# Patient Record
Sex: MALE | Race: WHITE | Employment: FULL TIME | URBAN - METROPOLITAN AREA
[De-identification: names, ages, dates, MRNs, and addresses within clinical notes are randomized per-mention and may not be internally consistent; named-entity substitution may affect disease eponyms.]

---

## 2017-06-09 ENCOUNTER — OFFICE VISIT (OUTPATIENT)
Dept: FAMILY MEDICINE CLINIC | Age: 42
End: 2017-06-09

## 2017-06-09 VITALS
DIASTOLIC BLOOD PRESSURE: 86 MMHG | HEART RATE: 64 BPM | BODY MASS INDEX: 41.31 KG/M2 | HEIGHT: 72 IN | SYSTOLIC BLOOD PRESSURE: 126 MMHG | WEIGHT: 305 LBS

## 2017-06-09 DIAGNOSIS — I10 ESSENTIAL HYPERTENSION: ICD-10-CM

## 2017-06-09 DIAGNOSIS — Z72.0 TOBACCO ABUSE: ICD-10-CM

## 2017-06-09 DIAGNOSIS — Q61.3 POLYCYSTIC KIDNEY DISEASE: Primary | ICD-10-CM

## 2017-06-09 PROCEDURE — 99214 OFFICE O/P EST MOD 30 MIN: CPT | Performed by: FAMILY MEDICINE

## 2017-06-09 RX ORDER — LISINOPRIL 10 MG/1
10 TABLET ORAL DAILY
Qty: 30 TABLET | Refills: 11 | Status: ON HOLD | OUTPATIENT
Start: 2017-06-09 | End: 2017-10-19

## 2017-08-10 ENCOUNTER — OFFICE VISIT (OUTPATIENT)
Dept: CARDIOLOGY CLINIC | Age: 42
End: 2017-08-10

## 2017-08-10 VITALS
WEIGHT: 305 LBS | SYSTOLIC BLOOD PRESSURE: 132 MMHG | HEIGHT: 72 IN | HEART RATE: 98 BPM | BODY MASS INDEX: 41.31 KG/M2 | DIASTOLIC BLOOD PRESSURE: 84 MMHG

## 2017-08-10 DIAGNOSIS — I10 ESSENTIAL HYPERTENSION: ICD-10-CM

## 2017-08-10 DIAGNOSIS — I47.1 SVT (SUPRAVENTRICULAR TACHYCARDIA) (HCC): Primary | ICD-10-CM

## 2017-08-10 DIAGNOSIS — Z72.0 TOBACCO ABUSE: ICD-10-CM

## 2017-08-10 PROCEDURE — 93000 ELECTROCARDIOGRAM COMPLETE: CPT | Performed by: NURSE PRACTITIONER

## 2017-08-10 PROCEDURE — 99214 OFFICE O/P EST MOD 30 MIN: CPT | Performed by: NURSE PRACTITIONER

## 2017-08-10 RX ORDER — METOPROLOL TARTRATE 50 MG/1
50 TABLET, FILM COATED ORAL 2 TIMES DAILY
Qty: 60 TABLET | Refills: 5 | Status: SHIPPED | OUTPATIENT
Start: 2017-08-10 | End: 2017-12-08 | Stop reason: SDUPTHER

## 2017-08-12 PROBLEM — N18.2 STAGE 2 CHRONIC KIDNEY DISEASE: Status: ACTIVE | Noted: 2017-08-12

## 2017-08-15 ENCOUNTER — TELEPHONE (OUTPATIENT)
Dept: CARDIOLOGY CLINIC | Age: 42
End: 2017-08-15

## 2017-08-22 ENCOUNTER — TELEPHONE (OUTPATIENT)
Dept: CARDIOLOGY | Age: 42
End: 2017-08-22

## 2017-08-29 ENCOUNTER — HOSPITAL ENCOUNTER (OUTPATIENT)
Dept: MRI IMAGING | Age: 42
Discharge: HOME | End: 2017-08-29
Admitting: INTERNAL MEDICINE

## 2017-08-29 DIAGNOSIS — Q61.2 ADULT TYPE POLYCYSTIC KIDNEY: ICD-10-CM

## 2017-08-29 DIAGNOSIS — Q61.2 POLYCYSTIC KIDNEY, AUTOSOMAL DOMINANT: ICD-10-CM

## 2017-10-19 DIAGNOSIS — G47.33 OSA (OBSTRUCTIVE SLEEP APNEA): Primary | ICD-10-CM

## 2017-10-19 PROBLEM — I48.92 ATRIAL FLUTTER (HCC): Status: ACTIVE | Noted: 2017-10-19

## 2017-11-10 ENCOUNTER — TELEPHONE (OUTPATIENT)
Dept: CARDIOLOGY CLINIC | Age: 42
End: 2017-11-10

## 2017-11-10 DIAGNOSIS — I47.1 SVT (SUPRAVENTRICULAR TACHYCARDIA) (HCC): Primary | ICD-10-CM

## 2017-11-10 DIAGNOSIS — I48.92 ATRIAL FLUTTER, UNSPECIFIED TYPE (HCC): ICD-10-CM

## 2017-11-10 NOTE — TELEPHONE ENCOUNTER
Pt's wife called stating patient was to receive a heart monitor in the mail but has not yet received it.

## 2017-11-29 ENCOUNTER — TELEPHONE (OUTPATIENT)
Dept: CARDIOLOGY CLINIC | Age: 42
End: 2017-11-29

## 2017-11-29 ENCOUNTER — TELEPHONE (OUTPATIENT)
Dept: FAMILY MEDICINE CLINIC | Age: 42
End: 2017-11-29

## 2017-11-29 DIAGNOSIS — R00.1 BRADYCARDIA: Primary | ICD-10-CM

## 2017-11-29 RX ORDER — LOSARTAN POTASSIUM 100 MG/1
100 TABLET ORAL DAILY
Qty: 30 TABLET | Refills: 3 | Status: SHIPPED | OUTPATIENT
Start: 2017-11-29 | End: 2018-06-10 | Stop reason: SDUPTHER

## 2017-11-29 NOTE — TELEPHONE ENCOUNTER
Spoke with pt. He does not have sleep apnea. Pt states he could have trouble with employer if diagnosed with sleep apnea. I explained that he needed to have sleep apnea evaluated. Will placed referral to Dr. Kaity Centeno.

## 2017-12-08 ENCOUNTER — OFFICE VISIT (OUTPATIENT)
Dept: CARDIOLOGY CLINIC | Age: 42
End: 2017-12-08

## 2017-12-08 VITALS
OXYGEN SATURATION: 93 % | HEART RATE: 70 BPM | SYSTOLIC BLOOD PRESSURE: 110 MMHG | HEIGHT: 72 IN | DIASTOLIC BLOOD PRESSURE: 80 MMHG | WEIGHT: 297 LBS | BODY MASS INDEX: 40.23 KG/M2

## 2017-12-08 DIAGNOSIS — I48.92 ATRIAL FLUTTER, UNSPECIFIED TYPE (HCC): Primary | ICD-10-CM

## 2017-12-08 PROCEDURE — G8427 DOCREV CUR MEDS BY ELIG CLIN: HCPCS | Performed by: INTERNAL MEDICINE

## 2017-12-08 PROCEDURE — G8484 FLU IMMUNIZE NO ADMIN: HCPCS | Performed by: INTERNAL MEDICINE

## 2017-12-08 PROCEDURE — 93000 ELECTROCARDIOGRAM COMPLETE: CPT | Performed by: INTERNAL MEDICINE

## 2017-12-08 PROCEDURE — G8417 CALC BMI ABV UP PARAM F/U: HCPCS | Performed by: INTERNAL MEDICINE

## 2017-12-08 PROCEDURE — 4004F PT TOBACCO SCREEN RCVD TLK: CPT | Performed by: INTERNAL MEDICINE

## 2017-12-08 PROCEDURE — 99214 OFFICE O/P EST MOD 30 MIN: CPT | Performed by: INTERNAL MEDICINE

## 2017-12-08 NOTE — PROGRESS NOTES
[address]   Electrophysiology Follow up            Date:  December 8, 2017  Patient name: Tori Schilder  YOB: 1975    Reason for visit: atrial arrhythmias     Primary Care physician: Nancy Severino MD     HISTORY OF PRESENT ILLNESS: Tori Schilder is a 36 y.o. male who presents for follow up for SVT. He reports while at work during lunch, his heart started racing. He went to [facility] ER. He reports he had an EKG in ER and in the ambulance. He was started on metoprolol at the ER. He denies previous episodes of palpitations. He has HTN and smokes 1 ppd.   10/19/2017 he underwent RFCA/AVN re-entry tachy. Interval history since last office visit:    His EKG from today shows sinus rhythm rate 61. His FREDY from 12/2017 showed nocturnal episodes of CHB. He has not yet been evaluated for sleep apnea. He has been taking his medications as prescribed. He tolerates daily activity well. He continues to smoke daily. Patient currently denies any weight gain, edema, palpitations, chest pain, shortness of breath, dizziness, and syncope. Past Medical History:   has a past medical history of CKD (chronic kidney disease); HTN (hypertension); PSVT (paroxysmal supraventricular tachycardia) ([address] and Stage 2 chronic kidney disease. Past Surgical History:   has a past surgical history that includes Cardiac catheterization (2008) and other surgical history (2001). Home Medications:    Prior to Admission medications    Medication Sig Start Date End Date Taking? Authorizing Provider   losartan (COZAAR) 100 MG tablet Take 1 tablet by mouth daily 11/29/17  Yes Jonnie Herr MD   metoprolol tartrate (LOPRESSOR) 50 MG tablet Take 1 tablet by mouth 2 times daily 8/10/17  Yes Micheal Cummings CNP       Allergies:  Review of patient's allergies indicates no known allergies. Social History:   reports that he has been smoking Cigarettes.   He has been smoking about 1.00 pack per day. He has never used smokeless tobacco. He reports that he does not drink alcohol or use drugs. Family History: family history includes Diabetes in his son; Other in his father and mother. · REVIEW OF SYSTEMS:  Unchanged since last visit  · Constitutional: there has been no unanticipated weight loss. There's been no change in energy level, sleep pattern, or activity level. · Eyes: No visual changes or diplopia. No scleral icterus. · ENT: No Headaches, hearing loss or vertigo. No mouth sores or sore throat. · Cardiovascular: No for chest pain, No for dyspnea  on exertion, No for palpitations or No for loss of consciousness. No cough, hemoptysis, No for pleuritic pain, or phlebitis. · Respiratory: No for cough or wheezing, no sputum production. No hematemesis. · Gastrointestinal: No abdominal pain, appetite loss, blood in stools. No change in  bowel or bladder habits. · Genitourinary: No dysuria, trouble voiding, or hematuria. · Musculoskeletal:  No gait disturbance, No for weakness or joint complaints. · Integumentary: No rash or pruritis. · Neurological: No headache, diplopia, change in muscle strength, numbness or tingling. No change in gait, balance, coordination, mood, affect, memory,  mentation, behavior. · Psychiatric: No anxiety, or depression. · Endocrine: No temperature intolerance. No excessive thirst, fluid intake, or urination. No tremor. · Hematologic/Lymphatic: No abnormal bruising or bleeding, blood clots or  swollen lymph nodes. · Allergic/Immunologic: No nasal congestion or hives. Physical Examination:  Unchanged since last visit   /80   Pulse 70   Ht 6' (1.829 m)   Wt 297 lb (134.7 kg)   SpO2 93%   BMI 40.28 kg/m²      Constitutional and General Appearance: alert, cooperative, no distress and appears stated age  [de-identified]: PERRL, no cervical lymphadenopathy. No masses palpable.  Normal oral mucosa  Respiratory:  · Normal excursion and expansion without use of accessory muscles  · Resp Auscultation: Normal breath sounds without dullness or wheezing  Cardiovascular:  · The apical impulse is not displaced  · Heart tones are crisp and normal. regular S1 and S2.  · Jugular venous pulsation Normal  · The carotid upstroke is normal in amplitude and contour without delay or bruit  · Peripheral pulses are symmetrical and full  Abdomen:  · No masses or tenderness  · Bowel sounds present  Extremities:  ·  No Cyanosis or Clubbing  ·  Lower extremity edema: No  · Skin: Warm and dry  Neurological:  · Alert and oriented. · Moves all extremities well  · No abnormalities of mood, affect, memory, mentation, or behavior are noted      DATA:    ECG:  SR HR 59  ECHO:       IMPRESSION:    1. Atrial flutter typical post ablation   2. S/p ablation for AFL and direct current cardioversion during EP study for Paroxysmal atrial fibrillation       RECOMMENDATIONS:  1. Smoking cessation discussed  2. Recommend sleep apnea testing. 3. Decrease Metoprolol to 25 mg twice daily due to episodes of bradycardia at night. 4. The patient is asked to make an attempt to improve diet and exercise patterns to aid in medical management of this problem. 5. Follow up with Matias Evans CNP in 6 months. 6. Follow up with me as scheduled    QUALITY MEASURES  1. Tobacco Cessation Counseling: Yes  2. Retake of BP if >140/90:   N/A  3. Documentation to PCP/referring for new patient:  Sent to PCP at close of office visit  4. CAD patient on anti-platelet: NA  5. CAD patient on STATIN therapy:  NA  6. Patient with CHF and aFib on anticoagulation:  NA       All questions and concerns were addressed to the patient/family. Alternatives to my treatment were discussed. KATHERYN HensleyC. Electrophysiology  [place] [address]. [address] [place] [address]. [address].   [id number]  Phone: [phone]  Fax: [phone]   9/11/2015  9:15 AM

## 2018-01-05 PROCEDURE — 93272 ECG/REVIEW INTERPRET ONLY: CPT | Performed by: INTERNAL MEDICINE

## 2018-01-17 DIAGNOSIS — I48.92 ATRIAL FLUTTER, UNSPECIFIED TYPE (HCC): ICD-10-CM

## 2018-01-17 DIAGNOSIS — I47.1 SVT (SUPRAVENTRICULAR TACHYCARDIA) (HCC): ICD-10-CM

## 2018-01-22 ENCOUNTER — TELEPHONE (OUTPATIENT)
Dept: CARDIOLOGY CLINIC | Age: 43
End: 2018-01-22

## 2018-01-22 NOTE — TELEPHONE ENCOUNTER
Created telephone encounter. Per Pt HIPAA from can leave results on machine. LMOM relaying message per RPS regarding event monitor. Pt to call the office with any concerns.

## 2018-01-22 NOTE — TELEPHONE ENCOUNTER
----- Message from Selene Stanford MD sent at 1/19/2018  9:50 AM EST -----  Please inform patient. No arrhythmia.  He had periods of complete heart block at night concerning for sleep apnea

## 2018-01-29 ENCOUNTER — TELEPHONE (OUTPATIENT)
Dept: CARDIOLOGY CLINIC | Age: 43
End: 2018-01-29

## 2018-02-22 ENCOUNTER — HOSPITAL ENCOUNTER (OUTPATIENT)
Dept: OTHER | Age: 43
Discharge: HOME | End: 2018-02-22
Attending: INTERNAL MEDICINE | Admitting: INTERNAL MEDICINE

## 2018-02-22 LAB
BILIRUBIN URINE: NEGATIVE
BLOOD, URINE: ABNORMAL
CLARITY: CLEAR
COLOR: ABNORMAL
EPITHELIAL CELLS, UA: ABNORMAL /HPF
GLUCOSE URINE: NEGATIVE MG/DL
KETONES, URINE: NEGATIVE MG/DL
LEUKOCYTE ESTERASE, URINE: NEGATIVE
MICROSCOPIC EXAMINATION: YES
NITRITE, URINE: NEGATIVE
PH UA: 6.5
PROTEIN UA: NEGATIVE MG/DL
RBC UA: ABNORMAL /HPF (ref 0–2)
SPECIFIC GRAVITY UA: 1.01
UROBILINOGEN, URINE: 0.2 E.U./DL
WBC UA: ABNORMAL /HPF (ref 0–5)

## 2018-02-23 LAB
ALBUMIN SERPL-MCNC: 4.1 G/DL (ref 3.4–5)
ANION GAP SERPL CALCULATED.3IONS-SCNC: 11 MMOL/L (ref 3–16)
BUN BLDV-MCNC: 15 MG/DL (ref 7–20)
CALCIUM SERPL-MCNC: 8.8 MG/DL (ref 8.3–10.6)
CHLORIDE BLD-SCNC: 103 MMOL/L (ref 99–110)
CO2: 24 MMOL/L (ref 21–32)
CREAT SERPL-MCNC: 0.7 MG/DL (ref 0.9–1.3)
GFR AFRICAN AMERICAN: >60
GFR NON-AFRICAN AMERICAN: >60
GLUCOSE BLD-MCNC: 92 MG/DL (ref 70–99)
PHOSPHORUS: 3.8 MG/DL (ref 2.5–4.9)
POTASSIUM SERPL-SCNC: 4 MMOL/L (ref 3.5–5.1)
SODIUM BLD-SCNC: 138 MMOL/L (ref 136–145)

## 2018-06-11 RX ORDER — LOSARTAN POTASSIUM 100 MG/1
TABLET ORAL
Qty: 30 TABLET | Refills: 2 | Status: SHIPPED | OUTPATIENT
Start: 2018-06-11 | End: 2019-03-01 | Stop reason: SDUPTHER

## 2018-06-14 ENCOUNTER — OFFICE VISIT (OUTPATIENT)
Dept: CARDIOLOGY CLINIC | Age: 43
End: 2018-06-14

## 2018-06-14 VITALS
SYSTOLIC BLOOD PRESSURE: 104 MMHG | HEIGHT: 72 IN | BODY MASS INDEX: 41.58 KG/M2 | HEART RATE: 64 BPM | WEIGHT: 307 LBS | DIASTOLIC BLOOD PRESSURE: 78 MMHG

## 2018-06-14 DIAGNOSIS — I10 ESSENTIAL HYPERTENSION: ICD-10-CM

## 2018-06-14 DIAGNOSIS — Z72.0 TOBACCO ABUSE: ICD-10-CM

## 2018-06-14 DIAGNOSIS — I48.3 TYPICAL ATRIAL FLUTTER (HCC): Primary | ICD-10-CM

## 2018-06-14 PROCEDURE — 93000 ELECTROCARDIOGRAM COMPLETE: CPT | Performed by: NURSE PRACTITIONER

## 2018-06-14 PROCEDURE — 99213 OFFICE O/P EST LOW 20 MIN: CPT | Performed by: NURSE PRACTITIONER

## 2018-07-15 ENCOUNTER — APPOINTMENT (OUTPATIENT)
Dept: CT IMAGING | Age: 43
End: 2018-07-15
Payer: COMMERCIAL

## 2018-07-15 ENCOUNTER — HOSPITAL ENCOUNTER (EMERGENCY)
Age: 43
Discharge: HOME | End: 2018-07-15
Attending: EMERGENCY MEDICINE
Payer: COMMERCIAL

## 2018-07-15 VITALS
DIASTOLIC BLOOD PRESSURE: 79 MMHG | HEIGHT: 72 IN | RESPIRATION RATE: 20 BRPM | OXYGEN SATURATION: 98 % | SYSTOLIC BLOOD PRESSURE: 124 MMHG | HEART RATE: 88 BPM | TEMPERATURE: 98.6 F | BODY MASS INDEX: 39.96 KG/M2 | WEIGHT: 295 LBS

## 2018-07-15 DIAGNOSIS — M54.50 ACUTE BILATERAL LOW BACK PAIN WITHOUT SCIATICA: ICD-10-CM

## 2018-07-15 DIAGNOSIS — Q61.3 POLYCYSTIC KIDNEY DISEASE: ICD-10-CM

## 2018-07-15 DIAGNOSIS — K76.0 FATTY LIVER: ICD-10-CM

## 2018-07-15 DIAGNOSIS — R10.9 RIGHT FLANK PAIN: Primary | ICD-10-CM

## 2018-07-15 LAB
A/G RATIO: 1.3 (ref 1.1–2.2)
ALBUMIN SERPL-MCNC: 4.4 G/DL (ref 3.4–5)
ALP BLD-CCNC: 72 U/L (ref 40–129)
ALT SERPL-CCNC: 49 U/L (ref 10–40)
ANION GAP SERPL CALCULATED.3IONS-SCNC: 12 MMOL/L (ref 3–16)
AST SERPL-CCNC: 27 U/L (ref 15–37)
BILIRUB SERPL-MCNC: 0.5 MG/DL (ref 0–1)
BILIRUBIN URINE: NEGATIVE
BLOOD, URINE: ABNORMAL
BUN BLDV-MCNC: 11 MG/DL (ref 7–20)
CALCIUM SERPL-MCNC: 9.2 MG/DL (ref 8.3–10.6)
CHLORIDE BLD-SCNC: 104 MMOL/L (ref 99–110)
CLARITY: CLEAR
CO2: 22 MMOL/L (ref 21–32)
COLOR: YELLOW
CREAT SERPL-MCNC: 0.7 MG/DL (ref 0.9–1.3)
GFR AFRICAN AMERICAN: >60
GFR NON-AFRICAN AMERICAN: >60
GLOBULIN: 3.4 G/DL
GLUCOSE BLD-MCNC: 85 MG/DL (ref 70–99)
GLUCOSE URINE: NEGATIVE MG/DL
HCT VFR BLD CALC: 51.5 % (ref 40.5–52.5)
HEMOGLOBIN: 17.9 G/DL (ref 13.5–17.5)
KETONES, URINE: NEGATIVE MG/DL
LEUKOCYTE ESTERASE, URINE: NEGATIVE
MCH RBC QN AUTO: 31.9 PG (ref 26–34)
MCHC RBC AUTO-ENTMCNC: 34.6 G/DL (ref 31–36)
MCV RBC AUTO: 92.2 FL (ref 80–100)
MICROSCOPIC EXAMINATION: YES
NITRITE, URINE: NEGATIVE
PDW BLD-RTO: 13.1 % (ref 12.4–15.4)
PH UA: 6
PLATELET # BLD: 188 K/UL (ref 135–450)
PMV BLD AUTO: 9.2 FL (ref 5–10.5)
POTASSIUM REFLEX MAGNESIUM: 4.1 MMOL/L (ref 3.5–5.1)
PROTEIN UA: NEGATIVE MG/DL
RBC # BLD: 5.59 M/UL (ref 4.2–5.9)
RBC UA: NORMAL /HPF (ref 0–2)
SODIUM BLD-SCNC: 138 MMOL/L (ref 136–145)
SPECIFIC GRAVITY UA: 1.02
TOTAL PROTEIN: 7.8 G/DL (ref 6.4–8.2)
URINE REFLEX TO CULTURE: ABNORMAL
URINE TYPE: ABNORMAL
UROBILINOGEN, URINE: 0.2 E.U./DL
WBC # BLD: 12.3 K/UL (ref 4–11)
WBC UA: NORMAL /HPF (ref 0–5)

## 2018-07-15 PROCEDURE — 74176 CT ABD & PELVIS W/O CONTRAST: CPT

## 2018-07-15 PROCEDURE — 80053 COMPREHEN METABOLIC PANEL: CPT

## 2018-07-15 PROCEDURE — 36415 COLL VENOUS BLD VENIPUNCTURE: CPT

## 2018-07-15 PROCEDURE — 99283 EMERGENCY DEPT VISIT LOW MDM: CPT

## 2018-07-15 PROCEDURE — 81001 URINALYSIS AUTO W/SCOPE: CPT

## 2018-07-15 PROCEDURE — 2580000003 HC RX 258: Performed by: PHYSICIAN ASSISTANT

## 2018-07-15 PROCEDURE — 85027 COMPLETE CBC AUTOMATED: CPT

## 2018-07-15 RX ORDER — 0.9 % SODIUM CHLORIDE 0.9 %
1000 INTRAVENOUS SOLUTION INTRAVENOUS ONCE
Status: COMPLETED | OUTPATIENT
Start: 2018-07-15 | End: 2018-07-15

## 2018-07-15 RX ORDER — NAPROXEN 500 MG/1
500 TABLET ORAL 2 TIMES DAILY WITH MEALS
Qty: 10 TABLET | Refills: 0 | Status: SHIPPED | OUTPATIENT
Start: 2018-07-15 | End: 2019-07-28

## 2018-07-15 RX ORDER — METHOCARBAMOL 500 MG/1
500 TABLET, FILM COATED ORAL 4 TIMES DAILY
Qty: 20 TABLET | Refills: 0 | Status: SHIPPED | OUTPATIENT
Start: 2018-07-15 | End: 2018-07-20

## 2018-07-15 RX ADMIN — SODIUM CHLORIDE 1000 ML: 900 INJECTION, SOLUTION INTRAVENOUS at 20:32

## 2018-07-15 ASSESSMENT — PAIN DESCRIPTION - DESCRIPTORS: DESCRIPTORS: ACHING

## 2018-07-15 ASSESSMENT — ENCOUNTER SYMPTOMS
COUGH: 0
SHORTNESS OF BREATH: 0
BACK PAIN: 1
ABDOMINAL PAIN: 0
VOMITING: 0

## 2018-07-15 ASSESSMENT — PAIN DESCRIPTION - PAIN TYPE: TYPE: ACUTE PAIN

## 2018-07-15 ASSESSMENT — PAIN SCALES - GENERAL: PAINLEVEL_OUTOF10: 7

## 2018-07-15 ASSESSMENT — PAIN DESCRIPTION - LOCATION: LOCATION: BACK

## 2018-07-15 ASSESSMENT — PAIN DESCRIPTION - FREQUENCY: FREQUENCY: CONTINUOUS

## 2018-07-15 ASSESSMENT — PAIN DESCRIPTION - ORIENTATION: ORIENTATION: LOWER

## 2018-07-15 NOTE — ED PROVIDER NOTES
HISTORY  family history includes Diabetes in his son; Other in his father and mother. SOCIAL HISTORY   reports that he has been smoking Cigarettes. He has been smoking about 1.00 pack per day. He has never used smokeless tobacco. He reports that he does not drink alcohol or use drugs. HOME MEDICATIONS     Prior to Admission medications    Medication Sig Start Date End Date Taking? Authorizing Provider   losartan (COZAAR) 100 MG tablet TAKE ONE TABLET BY MOUTH DAILY 6/11/18   Yessica Lobo MD   metoprolol tartrate (LOPRESSOR) 25 MG tablet Take 1 tablet by mouth 2 times daily 12/8/17   Magnolia Parks MD        ALLERGIES  has No Known Allergies. BP (!) 127/90   Pulse 85   Temp 98.2 °F (36.8 °C) (Oral)   Resp 20   Ht 6' (1.829 m)   Wt 295 lb (133.8 kg)   SpO2 97%   BMI 40.01 kg/m²     Physical Exam   Constitutional: He is oriented to person, place, and time. He appears well-developed and well-nourished. HENT:   Head: Normocephalic and atraumatic. Mouth/Throat: Oropharynx is clear and moist.   Eyes: Conjunctivae and EOM are normal. Pupils are equal, round, and reactive to light. Neck: Normal range of motion. Neck supple. Cardiovascular: Normal rate, regular rhythm, normal heart sounds and intact distal pulses. Pulmonary/Chest: Effort normal and breath sounds normal. No respiratory distress. He has no wheezes. He has no rales. Abdominal: Soft. Bowel sounds are normal. He exhibits no distension. There is no tenderness. There is no rigidity, no rebound and no guarding. Genitourinary: Testes normal and penis normal. Cremasteric reflex is present. Right testis shows no mass, no swelling and no tenderness. Left testis shows no mass, no swelling and no tenderness. Genitourinary Comments: No testicle tenderness or swelling, no erythema, normal lie. Cremasteric reflex present bilaterally. Some tenderness right inguinal crease, no hernia palpated.      Len NORIEGA chaperone for  exam. to reduce the radiation dose to as low as reasonably achievable. COMPARISON: 06/07/2017 HISTORY: ORDERING SYSTEM PROVIDED HISTORY: FLANK PAIN, STONE DISEASE SUSPECTED TECHNOLOGIST PROVIDED HISTORY: Ordering Physician Provided Reason for Exam: lt flank pain, hx of kidney stones Acuity: Acute Type of Exam: Initial FINDINGS: LOWER CHEST: No focal abnormality. KIDNEYS AND URINARY TRACT: The kidneys are mildly enlarged containing innumerable cysts. There is an area of rim calcification in the anterior superior left kidney. A nonobstructing 7 mm stone in the lower pole of the left kidney is present. No hydronephrosis or inflammatory change identified. No stone identified within either ureter or the bladder. ORGANS: Lack of intravenous contrast limits evaluation of the solid organs and bowel. Hepatic steatosis is noted. The gallbladder, pancreas, spleen, and adrenals reveal no acute abnormality. GI/BOWEL: No bowel dilatation or wall thickening. PELVIS: The bladder and pelvic organs are unremarkable. PERITONEUM/RETROPERITONEUM: No lymphadenopathy is noted. No free air or free fluid. The aorta is normal in caliber. BONES/SOFT TISSUES: No acute osseous abnormality is identified. Nonobstructing left nephrolithiasis. Polycystic kidneys. Hepatic steatosis. 9:04 PM  Recheck patient. Stable. Discussed test results with him. Nonobstructing left renal stone, no passing ureteral stones. Polycystic kidneys and fatty liver. Patient's pain is likely musculoskeletal in origin, he has bilateral low back pain worse on the right with some pain radiating right inguinal area. Pain is positional.  Treatment with muscle relaxer and limited 5 day course of anti-inflammatory medicine to follow-up with primary care doctor and his kidney specialist.  Joana Needs returning to the ER for any worsening symptoms. He understands and agrees.     I estimate there is LOW risk for ACUTE APPENDICITIS, BOWEL OBSTRUCTION, CHOLECYSTITIS,

## 2018-07-16 NOTE — ED PROVIDER NOTES
words and phrases that may be inappropriate. If there are any questions or concerns please feel free to contact the dictating provider for clarification.            Justino Dumont, DO  07/17/18 2100

## 2019-01-03 ENCOUNTER — HOSPITAL ENCOUNTER (OUTPATIENT)
Dept: ULTRASOUND IMAGING | Age: 44
Discharge: HOME | End: 2019-01-03
Payer: COMMERCIAL

## 2019-01-03 DIAGNOSIS — N23 RENAL COLIC: ICD-10-CM

## 2019-01-03 PROCEDURE — 76770 US EXAM ABDO BACK WALL COMP: CPT

## 2019-01-22 RX ORDER — LOSARTAN POTASSIUM 100 MG/1
TABLET ORAL
Qty: 30 TABLET | Refills: 1 | OUTPATIENT
Start: 2019-01-22

## 2019-01-23 ENCOUNTER — TELEPHONE (OUTPATIENT)
Dept: PRIMARY CARE CLINIC | Age: 44
End: 2019-01-23

## 2019-03-01 ENCOUNTER — OFFICE VISIT (OUTPATIENT)
Dept: PRIMARY CARE CLINIC | Age: 44
End: 2019-03-01
Payer: COMMERCIAL

## 2019-03-01 VITALS
SYSTOLIC BLOOD PRESSURE: 126 MMHG | DIASTOLIC BLOOD PRESSURE: 82 MMHG | HEART RATE: 72 BPM | BODY MASS INDEX: 42.31 KG/M2 | WEIGHT: 312 LBS | OXYGEN SATURATION: 95 %

## 2019-03-01 DIAGNOSIS — Z72.0 TOBACCO ABUSE: ICD-10-CM

## 2019-03-01 DIAGNOSIS — I10 ESSENTIAL HYPERTENSION: Primary | ICD-10-CM

## 2019-03-01 DIAGNOSIS — I48.3 TYPICAL ATRIAL FLUTTER (HCC): ICD-10-CM

## 2019-03-01 DIAGNOSIS — Q61.3 POLYCYSTIC KIDNEY: ICD-10-CM

## 2019-03-01 DIAGNOSIS — Z23 NEED FOR TDAP VACCINATION: ICD-10-CM

## 2019-03-01 PROCEDURE — 99214 OFFICE O/P EST MOD 30 MIN: CPT | Performed by: FAMILY MEDICINE

## 2019-03-01 PROCEDURE — 90471 IMMUNIZATION ADMIN: CPT | Performed by: FAMILY MEDICINE

## 2019-03-01 PROCEDURE — 90715 TDAP VACCINE 7 YRS/> IM: CPT | Performed by: FAMILY MEDICINE

## 2019-03-01 RX ORDER — LOSARTAN POTASSIUM 100 MG/1
TABLET ORAL
Qty: 90 TABLET | Refills: 3 | Status: SHIPPED | OUTPATIENT
Start: 2019-03-01 | End: 2019-08-01 | Stop reason: CLARIF

## 2019-03-01 ASSESSMENT — ENCOUNTER SYMPTOMS
NAUSEA: 0
COUGH: 1
SHORTNESS OF BREATH: 0
ABDOMINAL PAIN: 0
CONSTIPATION: 0
VOMITING: 0
DIARRHEA: 0

## 2019-03-01 ASSESSMENT — PATIENT HEALTH QUESTIONNAIRE - PHQ9
1. LITTLE INTEREST OR PLEASURE IN DOING THINGS: 0
SUM OF ALL RESPONSES TO PHQ QUESTIONS 1-9: 0
SUM OF ALL RESPONSES TO PHQ QUESTIONS 1-9: 0
SUM OF ALL RESPONSES TO PHQ9 QUESTIONS 1 & 2: 0
2. FEELING DOWN, DEPRESSED OR HOPELESS: 0

## 2019-07-24 ENCOUNTER — HOSPITAL ENCOUNTER (EMERGENCY)
Age: 44
Discharge: HOME | End: 2019-07-24
Payer: COMMERCIAL

## 2019-07-24 ENCOUNTER — APPOINTMENT (OUTPATIENT)
Dept: GENERAL RADIOLOGY | Age: 44
End: 2019-07-24
Payer: COMMERCIAL

## 2019-07-24 VITALS
HEIGHT: 72 IN | DIASTOLIC BLOOD PRESSURE: 84 MMHG | HEART RATE: 71 BPM | WEIGHT: 295 LBS | SYSTOLIC BLOOD PRESSURE: 162 MMHG | RESPIRATION RATE: 14 BRPM | BODY MASS INDEX: 39.96 KG/M2 | OXYGEN SATURATION: 98 % | TEMPERATURE: 99.1 F

## 2019-07-24 DIAGNOSIS — M77.12 LEFT LATERAL EPICONDYLITIS: Primary | ICD-10-CM

## 2019-07-24 DIAGNOSIS — R03.0 ELEVATED BLOOD PRESSURE READING: ICD-10-CM

## 2019-07-24 PROCEDURE — 73070 X-RAY EXAM OF ELBOW: CPT

## 2019-07-24 PROCEDURE — 99283 EMERGENCY DEPT VISIT LOW MDM: CPT

## 2019-07-24 RX ORDER — METHYLPREDNISOLONE 4 MG/1
4 TABLET ORAL SEE ADMIN INSTRUCTIONS
Qty: 1 KIT | Refills: 0 | Status: SHIPPED | OUTPATIENT
Start: 2019-07-24 | End: 2019-07-30

## 2019-07-24 ASSESSMENT — ENCOUNTER SYMPTOMS
COLOR CHANGE: 0
SHORTNESS OF BREATH: 0

## 2019-07-24 ASSESSMENT — PAIN DESCRIPTION - PAIN TYPE: TYPE: ACUTE PAIN

## 2019-07-24 ASSESSMENT — PAIN DESCRIPTION - DESCRIPTORS: DESCRIPTORS: BURNING

## 2019-07-24 ASSESSMENT — PAIN SCALES - GENERAL: PAINLEVEL_OUTOF10: 5

## 2019-07-24 ASSESSMENT — PAIN DESCRIPTION - LOCATION: LOCATION: ELBOW

## 2019-07-24 NOTE — ED PROVIDER NOTES
Evaluated by MANNY supervising physician available for consult    Patient having outer left elbow pain for the past 2 weeks. No known injury no falls. 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 Pain is worse with movement flexing at the elbow or making a fist causes shooting pains through forearm and to elbow. Tender to touch outer elbow. No numbness. No chest pain shortness of breath. No neck pain. Has been using ice packs and taking Aleve days has not gotten any worse but not getting better so came in to get it checked out. The history is provided by the patient. Arm Injury   Location:  Elbow  Elbow location:  L elbow  Pain details:     Onset quality:  Gradual    Duration:  2 weeks    Progression:  Unchanged  Worsened by: Movement and stretching area  Associated symptoms: no decreased range of motion, no fever, no neck pain and no numbness        Review of Systems   Constitutional: Negative for fever. Respiratory: Negative for shortness of breath. Cardiovascular: Negative for chest pain. Musculoskeletal: Negative for neck pain. Left elbow pain   Skin: Negative for color change, rash and wound. Neurological: Negative for numbness. PAST MEDICAL HISTORY   has a past medical history of CKD (chronic kidney disease), HTN (hypertension), Polycystic kidney, PSVT (paroxysmal supraventricular tachycardia) ([address] and Stage 2 chronic kidney disease (8/12/2017). PAST SURGICAL HISTORY   has a past surgical history that includes Cardiac catheterization (2008) and other surgical history (2001). FAMILY HISTORY  family history includes Diabetes in his son; Other in his father and mother. SOCIAL HISTORY   reports that he has been smoking cigarettes. He has been smoking about 1.00 pack per day. He has never used smokeless tobacco. He reports that he does not drink alcohol or use drugs.     HOME MEDICATIONS     Prior to Admission medications    Medication Sig Start Date End Date Taking? Authorizing Provider   metoprolol tartrate (LOPRESSOR) 25 MG tablet TAKE ONE TABLET BY MOUTH TWICE A DAY 3/1/19  Yes Keisha Wright MD   losartan (COZAAR) 100 MG tablet TAKE ONE TABLET BY MOUTH DAILY 3/1/19  Yes Keisha Wright MD   naproxen (NAPROSYN) 500 MG tablet Take 1 tablet by mouth 2 times daily (with meals) for 5 days 7/15/18 7/20/18  Shaggy Osorio PA-C        ALLERGIES  has No Known Allergies. BP (!) 162/84   Pulse 71   Temp 99.1 °F (37.3 °C) (Oral)   Resp 14   Ht 6' (1.829 m)   Wt 295 lb (133.8 kg)   SpO2 98%   BMI 40.01 kg/m²     Physical Exam   Constitutional: He is oriented to person, place, and time. He appears well-developed and well-nourished. Non-toxic appearance. He does not have a sickly appearance. He does not appear ill. HENT:   Head: Normocephalic and atraumatic. Mouth/Throat: Oropharynx is clear and moist.   Neck: Normal range of motion. Neck supple. Cardiovascular: Normal rate, regular rhythm, normal heart sounds and intact distal pulses. Pulses:       Radial pulses are 2+ on the right side, and 2+ on the left side. Pulmonary/Chest: Effort normal and breath sounds normal. No stridor. No respiratory distress. He has no wheezes. He has no rales. Musculoskeletal:        Left elbow: He exhibits normal range of motion, no effusion and no deformity. Tenderness found. Lateral epicondyle tenderness noted. Left wrist: He exhibits normal range of motion and no tenderness. Cervical back: He exhibits normal range of motion, no tenderness and no bony tenderness. Arms:       Left hand: He exhibits normal range of motion, no tenderness, normal capillary refill, no deformity and no swelling. Normal sensation noted. Normal strength noted. Neurological: He is alert and oriented to person, place, and time. No sensory deficit. He exhibits normal muscle tone. GCS eye subscore is 4.  GCS verbal subscore is in transcription may have occurred         Faby Patel PA-C  07/24/19 1935

## 2019-07-28 ENCOUNTER — HOSPITAL ENCOUNTER (EMERGENCY)
Age: 44
Discharge: HOME | End: 2019-07-28
Payer: COMMERCIAL

## 2019-07-28 VITALS
RESPIRATION RATE: 14 BRPM | HEIGHT: 72 IN | WEIGHT: 295 LBS | SYSTOLIC BLOOD PRESSURE: 118 MMHG | DIASTOLIC BLOOD PRESSURE: 84 MMHG | BODY MASS INDEX: 39.96 KG/M2 | TEMPERATURE: 99.4 F | OXYGEN SATURATION: 93 % | HEART RATE: 83 BPM

## 2019-07-28 DIAGNOSIS — B02.9 HERPES ZOSTER WITHOUT COMPLICATION: Primary | ICD-10-CM

## 2019-07-28 PROCEDURE — 99282 EMERGENCY DEPT VISIT SF MDM: CPT

## 2019-07-28 PROCEDURE — 6370000000 HC RX 637 (ALT 250 FOR IP): Performed by: PHYSICIAN ASSISTANT

## 2019-07-28 RX ORDER — ONDANSETRON 4 MG/1
4 TABLET, ORALLY DISINTEGRATING ORAL ONCE
Status: COMPLETED | OUTPATIENT
Start: 2019-07-28 | End: 2019-07-28

## 2019-07-28 RX ORDER — ACYCLOVIR 800 MG/1
800 TABLET ORAL
Qty: 50 TABLET | Refills: 0 | Status: SHIPPED | OUTPATIENT
Start: 2019-07-28 | End: 2019-08-07

## 2019-07-28 RX ORDER — PROMETHAZINE HYDROCHLORIDE 25 MG/1
25 TABLET ORAL EVERY 6 HOURS PRN
Qty: 20 TABLET | Refills: 0 | Status: SHIPPED | OUTPATIENT
Start: 2019-07-28 | End: 2019-08-04

## 2019-07-28 RX ORDER — OXYCODONE HYDROCHLORIDE AND ACETAMINOPHEN 5; 325 MG/1; MG/1
2 TABLET ORAL ONCE
Status: COMPLETED | OUTPATIENT
Start: 2019-07-28 | End: 2019-07-28

## 2019-07-28 RX ORDER — ACYCLOVIR 800 MG/1
800 TABLET ORAL ONCE
Status: COMPLETED | OUTPATIENT
Start: 2019-07-28 | End: 2019-07-28

## 2019-07-28 RX ORDER — OXYCODONE HYDROCHLORIDE AND ACETAMINOPHEN 5; 325 MG/1; MG/1
1-2 TABLET ORAL EVERY 6 HOURS PRN
Qty: 20 TABLET | Refills: 0 | Status: SHIPPED | OUTPATIENT
Start: 2019-07-28 | End: 2019-07-31

## 2019-07-28 RX ADMIN — ONDANSETRON 4 MG: 4 TABLET, ORALLY DISINTEGRATING ORAL at 20:37

## 2019-07-28 RX ADMIN — ACYCLOVIR 800 MG: 800 TABLET ORAL at 20:37

## 2019-07-28 RX ADMIN — OXYCODONE HYDROCHLORIDE AND ACETAMINOPHEN 2 TABLET: 5; 325 TABLET ORAL at 20:37

## 2019-07-28 ASSESSMENT — PAIN DESCRIPTION - FREQUENCY: FREQUENCY: CONTINUOUS

## 2019-07-28 ASSESSMENT — PAIN DESCRIPTION - DESCRIPTORS: DESCRIPTORS: BURNING;ACHING

## 2019-07-28 ASSESSMENT — PAIN SCALES - GENERAL: PAINLEVEL_OUTOF10: 3

## 2019-07-28 ASSESSMENT — PAIN DESCRIPTION - LOCATION: LOCATION: BACK;RIB CAGE

## 2019-07-29 NOTE — ED PROVIDER NOTES
acute distress  Eyes:  PERRLA, EOMI x6, no nystagmus no photophobia  HENT:  Teeth and tongue intact, uvula midline, no PTA or retropharyngeal abscess noted no other intraoral lesion or edema appreciated patient tolerating secretions well, no stridor, no nuchal rigidity  Respiratory:  Clear to auscultation bilaterally, no crepitus or subcutaneous emphysema noted with palpation in the bilateral chest wall  Cardiovascular:  S1-S2, no S3  GI:  Abdomen soft nontender nondistended  :  Exam deferred for now no subjective complaints  Musculoskeletal:  Distally neurovascularly intact 2+ pulses normal capillary refill gross sensorimotor intact ×4 extremities  Integument: Red excoriated erupting rash and what appears to be a linear dermatome distribution along perhaps the fifth or sixth rib of his left posterior thorax  Lymphatic:  No significant lymphadenopathy appreciated  Neurologic:  Alert and oriented ×3, cranial nerves II through XII grossly intact as tested, patient able to ambulate, no ataxia, cauda equina, saddle anesthesia or bowel or bladder incontinence  Psychiatric:  Mood, behavior, judgment all within normal limits      ED Course and MDM    David Coma Scale  Eye Opening: Spontaneous  Best Verbal Response: Oriented  Best Motor Response: Obeys commands  [place] Coma Scale Score: 15      In brief, Maninder Leslie is a 39 y.o. male who presented to the emergency department for evaluation of 3 to 4-day history of burning irritating rash to the posterior thorax consistent with shingles    Patient's clinical presentation most consistent with shingles manifestation ports the patient was initiated on antiviral therapy as well as pain management here in the emergency department and will be provided continuation of this while he awaits outpatient follow-up with his PCP    ED Medication Orders (From admission, onward)    Start Ordered     Status Ordering Provider    07/28/19 2015 07/28/19 2008  oxyCODONE-acetaminophen

## 2019-07-31 ENCOUNTER — TELEPHONE (OUTPATIENT)
Dept: PRIMARY CARE CLINIC | Age: 44
End: 2019-07-31

## 2019-08-01 ENCOUNTER — OFFICE VISIT (OUTPATIENT)
Dept: PRIMARY CARE CLINIC | Age: 44
End: 2019-08-01
Payer: COMMERCIAL

## 2019-08-01 VITALS
WEIGHT: 301 LBS | HEART RATE: 83 BPM | DIASTOLIC BLOOD PRESSURE: 88 MMHG | SYSTOLIC BLOOD PRESSURE: 136 MMHG | HEIGHT: 72 IN | BODY MASS INDEX: 40.77 KG/M2

## 2019-08-01 DIAGNOSIS — B02.9 ACUTE PAIN ASSOCIATED WITH HERPES ZOSTER: Primary | ICD-10-CM

## 2019-08-01 PROCEDURE — 99213 OFFICE O/P EST LOW 20 MIN: CPT | Performed by: FAMILY MEDICINE

## 2019-08-01 RX ORDER — OXYCODONE HYDROCHLORIDE AND ACETAMINOPHEN 5; 325 MG/1; MG/1
2 TABLET ORAL EVERY 6 HOURS PRN
COMMUNITY
End: 2020-06-09

## 2019-08-01 RX ORDER — GABAPENTIN 800 MG/1
800 TABLET ORAL 3 TIMES DAILY
Qty: 90 TABLET | Refills: 3 | Status: SHIPPED | OUTPATIENT
Start: 2019-08-01 | End: 2020-09-02 | Stop reason: CLARIF

## 2019-08-01 ASSESSMENT — ENCOUNTER SYMPTOMS
SHORTNESS OF BREATH: 0
CONSTIPATION: 0
ABDOMINAL PAIN: 0
DIARRHEA: 0
VOMITING: 0
NAUSEA: 0
COUGH: 0

## 2019-08-01 NOTE — PROGRESS NOTES
Chief Complaint   Patient presents with    Herpes Zoster     [facility] ER on  for shingles         HPI:  Nehal Ahumada is a 39 y.o. ([id number]) here today for Shingles. Nehal Ahumada is here for an ER follow up on Shingles. He describes 3 to 4-day history of development of rash as well as painful burning irritation localized to the left side of his posterior thorax, axillary, and chest.   He describes the pain as irritating, burning. He describes his pain 8/10. He states the rash has spread, and the medications are not helping. Review of Systems   Constitutional: Negative for chills and fever. Respiratory: Negative for cough and shortness of breath. Cardiovascular: Negative for chest pain and palpitations. Gastrointestinal: Negative for abdominal pain, constipation, diarrhea, nausea and vomiting. Endocrine: Negative for polyuria. Genitourinary: Negative for dysuria.        Past Medical History:   Diagnosis Date    CKD (chronic kidney disease)     HTN (hypertension)     Polycystic kidney     Dr. Laurie Blackman [facility]     Stage 2 chronic kidney disease 2017     Family History   Problem Relation Age of Onset    Diabetes Son     Other Mother          with pericardial effusion    Other Father         AW     Social History     Socioeconomic History    Marital status:      Spouse name: Kike Andrew Number of children: 2    Years of education: Not on file    Highest education level: Not on file   Occupational History    Occupation:    Social Needs    Financial resource strain: Not on file    Food insecurity:     Worry: Not on file     Inability: Not on file   Cianna Medical needs:     Medical: Not on file     Non-medical: Not on file   Tobacco Use    Smoking status: Current Every Day Smoker     Packs/day: 1.00     Types: Cigarettes    Smokeless tobacco: Never Used    Tobacco comment: contemplating   Substance

## 2020-06-09 ENCOUNTER — HOSPITAL ENCOUNTER (EMERGENCY)
Age: 45
Discharge: HOME | End: 2020-06-09
Payer: COMMERCIAL

## 2020-06-09 ENCOUNTER — APPOINTMENT (OUTPATIENT)
Dept: CT IMAGING | Age: 45
End: 2020-06-09
Payer: COMMERCIAL

## 2020-06-09 VITALS
RESPIRATION RATE: 15 BRPM | HEIGHT: 71 IN | WEIGHT: 290 LBS | TEMPERATURE: 98.1 F | OXYGEN SATURATION: 95 % | SYSTOLIC BLOOD PRESSURE: 141 MMHG | BODY MASS INDEX: 40.6 KG/M2 | HEART RATE: 65 BPM | DIASTOLIC BLOOD PRESSURE: 81 MMHG

## 2020-06-09 LAB
A/G RATIO: 1.5 (ref 1.1–2.2)
ALBUMIN SERPL-MCNC: 4.4 G/DL (ref 3.4–5)
ALP BLD-CCNC: 64 U/L (ref 40–129)
ALT SERPL-CCNC: 35 U/L (ref 10–40)
ANION GAP SERPL CALCULATED.3IONS-SCNC: 10 MMOL/L (ref 3–16)
AST SERPL-CCNC: 29 U/L (ref 15–37)
BACTERIA: ABNORMAL /HPF
BILIRUB SERPL-MCNC: 0.9 MG/DL (ref 0–1)
BILIRUBIN URINE: NEGATIVE
BLOOD, URINE: ABNORMAL
BUN BLDV-MCNC: 11 MG/DL (ref 7–20)
CALCIUM SERPL-MCNC: 9.3 MG/DL (ref 8.3–10.6)
CHLORIDE BLD-SCNC: 105 MMOL/L (ref 99–110)
CLARITY: CLEAR
CO2: 23 MMOL/L (ref 21–32)
COLOR: YELLOW
CREAT SERPL-MCNC: 0.7 MG/DL (ref 0.9–1.3)
GFR AFRICAN AMERICAN: >60
GFR NON-AFRICAN AMERICAN: >60
GLOBULIN: 2.9 G/DL
GLUCOSE BLD-MCNC: 79 MG/DL (ref 70–99)
GLUCOSE URINE: NEGATIVE MG/DL
HCT VFR BLD CALC: 51.6 % (ref 40.5–52.5)
HEMOGLOBIN: 17.8 G/DL (ref 13.5–17.5)
KETONES, URINE: NEGATIVE MG/DL
LEUKOCYTE ESTERASE, URINE: NEGATIVE
MCH RBC QN AUTO: 31.8 PG (ref 26–34)
MCHC RBC AUTO-ENTMCNC: 34.4 G/DL (ref 31–36)
MCV RBC AUTO: 92.3 FL (ref 80–100)
MICROSCOPIC EXAMINATION: YES
NITRITE, URINE: NEGATIVE
PDW BLD-RTO: 13.1 % (ref 12.4–15.4)
PH UA: 7 (ref 5–8)
PLATELET # BLD: 179 K/UL (ref 135–450)
PMV BLD AUTO: 9.6 FL (ref 5–10.5)
POTASSIUM SERPL-SCNC: 4 MMOL/L (ref 3.5–5.1)
PROTEIN UA: NEGATIVE MG/DL
RBC # BLD: 5.59 M/UL (ref 4.2–5.9)
RBC UA: ABNORMAL /HPF (ref 0–4)
SODIUM BLD-SCNC: 138 MMOL/L (ref 136–145)
SPECIFIC GRAVITY UA: 1.02 (ref 1–1.03)
TOTAL PROTEIN: 7.3 G/DL (ref 6.4–8.2)
URINE TYPE: ABNORMAL
UROBILINOGEN, URINE: 0.2 E.U./DL
WBC # BLD: 9.4 K/UL (ref 4–11)
WBC UA: ABNORMAL /HPF (ref 0–5)

## 2020-06-09 PROCEDURE — 85027 COMPLETE CBC AUTOMATED: CPT

## 2020-06-09 PROCEDURE — 2580000003 HC RX 258: Performed by: NURSE PRACTITIONER

## 2020-06-09 PROCEDURE — 96376 TX/PRO/DX INJ SAME DRUG ADON: CPT

## 2020-06-09 PROCEDURE — 96375 TX/PRO/DX INJ NEW DRUG ADDON: CPT

## 2020-06-09 PROCEDURE — 74176 CT ABD & PELVIS W/O CONTRAST: CPT

## 2020-06-09 PROCEDURE — 81001 URINALYSIS AUTO W/SCOPE: CPT

## 2020-06-09 PROCEDURE — 96374 THER/PROPH/DIAG INJ IV PUSH: CPT

## 2020-06-09 PROCEDURE — 99284 EMERGENCY DEPT VISIT MOD MDM: CPT

## 2020-06-09 PROCEDURE — 6360000002 HC RX W HCPCS: Performed by: NURSE PRACTITIONER

## 2020-06-09 PROCEDURE — 80053 COMPREHEN METABOLIC PANEL: CPT

## 2020-06-09 RX ORDER — OXYCODONE HYDROCHLORIDE AND ACETAMINOPHEN 5; 325 MG/1; MG/1
1-2 TABLET ORAL EVERY 6 HOURS PRN
Qty: 20 TABLET | Refills: 0 | Status: SHIPPED | OUTPATIENT
Start: 2020-06-09 | End: 2020-06-12

## 2020-06-09 RX ORDER — MORPHINE SULFATE 4 MG/ML
4 INJECTION, SOLUTION INTRAMUSCULAR; INTRAVENOUS EVERY 30 MIN PRN
Status: DISCONTINUED | OUTPATIENT
Start: 2020-06-09 | End: 2020-06-09 | Stop reason: HOSPADM

## 2020-06-09 RX ORDER — CYCLOBENZAPRINE HCL 10 MG
10 TABLET ORAL 3 TIMES DAILY PRN
Qty: 21 TABLET | Refills: 0 | Status: SHIPPED | OUTPATIENT
Start: 2020-06-09 | End: 2020-06-19

## 2020-06-09 RX ORDER — 0.9 % SODIUM CHLORIDE 0.9 %
1000 INTRAVENOUS SOLUTION INTRAVENOUS ONCE
Status: COMPLETED | OUTPATIENT
Start: 2020-06-09 | End: 2020-06-09

## 2020-06-09 RX ORDER — ONDANSETRON 2 MG/ML
4 INJECTION INTRAMUSCULAR; INTRAVENOUS EVERY 30 MIN PRN
Status: DISCONTINUED | OUTPATIENT
Start: 2020-06-09 | End: 2020-06-09 | Stop reason: HOSPADM

## 2020-06-09 RX ADMIN — SODIUM CHLORIDE 1000 ML: 9 INJECTION, SOLUTION INTRAVENOUS at 14:01

## 2020-06-09 RX ADMIN — MORPHINE SULFATE 4 MG: 4 INJECTION, SOLUTION INTRAMUSCULAR; INTRAVENOUS at 14:02

## 2020-06-09 RX ADMIN — ONDANSETRON 4 MG: 2 INJECTION INTRAMUSCULAR; INTRAVENOUS at 14:02

## 2020-06-09 RX ADMIN — MORPHINE SULFATE 4 MG: 4 INJECTION, SOLUTION INTRAMUSCULAR; INTRAVENOUS at 14:49

## 2020-06-09 ASSESSMENT — PAIN DESCRIPTION - DESCRIPTORS
DESCRIPTORS: CONSTANT
DESCRIPTORS: CONSTANT;SHARP

## 2020-06-09 ASSESSMENT — PAIN DESCRIPTION - LOCATION
LOCATION: BACK

## 2020-06-09 ASSESSMENT — PAIN SCALES - GENERAL
PAINLEVEL_OUTOF10: 8

## 2020-06-09 ASSESSMENT — PAIN DESCRIPTION - ORIENTATION
ORIENTATION: LEFT;RIGHT;LOWER
ORIENTATION: LOWER;RIGHT

## 2020-06-09 ASSESSMENT — PAIN DESCRIPTION - PAIN TYPE
TYPE: ACUTE PAIN
TYPE: ACUTE PAIN

## 2020-06-09 NOTE — ED PROVIDER NOTES
5. 59 4.20 - 5.90 M/uL    Hemoglobin 17.8 (H) 13.5 - 17.5 g/dL    Hematocrit 51.6 40.5 - 52.5 %    MCV 92.3 80.0 - 100.0 fL    MCH 31.8 26.0 - 34.0 pg    MCHC 34.4 31.0 - 36.0 g/dL    RDW 13.1 12.4 - 15.4 %    Platelets 579 416 - 094 K/uL    MPV 9.6 5.0 - 10.5 fL   Comprehensive metabolic panel   Result Value Ref Range    Sodium 138 136 - 145 mmol/L    Potassium 4.0 3.5 - 5.1 mmol/L    Chloride 105 99 - 110 mmol/L    CO2 23 21 - 32 mmol/L    Anion Gap 10 3 - 16    Glucose 79 70 - 99 mg/dL    BUN 11 7 - 20 mg/dL    CREATININE 0.7 (L) 0.9 - 1.3 mg/dL    GFR Non-African American >60 >60    GFR African American >60 >60    Calcium 9.3 8.3 - 10.6 mg/dL    Total Protein 7.3 6.4 - 8.2 g/dL    Alb 4.4 3.4 - 5.0 g/dL    Albumin/Globulin Ratio 1.5 1.1 - 2.2    Total Bilirubin 0.9 0.0 - 1.0 mg/dL    Alkaline Phosphatase 64 40 - 129 U/L    ALT 35 10 - 40 U/L    AST 29 15 - 37 U/L    Globulin 2.9 g/dL   Urinalysis, reflex to microscopic   Result Value Ref Range    Color, UA Yellow Straw/Yellow    Clarity, UA Clear Clear    Glucose, Ur Negative Negative mg/dL    Bilirubin Urine Negative Negative    Ketones, Urine Negative Negative mg/dL    Specific Gravity, UA 1.020 1.005 - 1.030    Blood, Urine TRACE-INTACT (A) Negative    pH, UA 7.0 5.0 - 8.0    Protein, UA Negative Negative mg/dL    Urobilinogen, Urine 0.2 <2.0 E.U./dL    Nitrite, Urine Negative Negative    Leukocyte Esterase, Urine Negative Negative    Microscopic Examination YES     Urine Type unknown    Microscopic Urinalysis   Result Value Ref Range    WBC, UA None seen 0 - 5 /HPF    RBC, UA 0-2 0 - 4 /HPF    Bacteria, UA Rare (A) None Seen /HPF       RADIOLOGY    Ct Abdomen Pelvis Wo Contrast Additional Contrast? None    Result Date: 6/9/2020  EXAMINATION: CT OF THE ABDOMEN AND PELVIS WITHOUT CONTRAST 6/9/2020 2:13 pm TECHNIQUE: CT of the abdomen and pelvis was performed without the administration of intravenous contrast. Multiplanar reformatted images are provided for

## 2020-06-09 NOTE — ED NOTES
--Patient provided with discharge instructions and 2 prescriptions. --Instructions, and follow-up appointments reviewed with patient. No further questions or needs at this time. --Vital signs and patient stable upon discharge. --Patient taken to lobby by wheelchair where wife was waiting with care for transfer to home.         Xenia Braden RN  06/09/20 [id number]

## 2020-08-13 ENCOUNTER — OFFICE VISIT (OUTPATIENT)
Dept: PRIMARY CARE CLINIC | Age: 45
End: 2020-08-13
Payer: COMMERCIAL

## 2020-08-13 VITALS
HEIGHT: 71 IN | BODY MASS INDEX: 42.14 KG/M2 | TEMPERATURE: 97.8 F | SYSTOLIC BLOOD PRESSURE: 134 MMHG | HEART RATE: 78 BPM | DIASTOLIC BLOOD PRESSURE: 80 MMHG | WEIGHT: 301 LBS

## 2020-08-13 DIAGNOSIS — I10 ESSENTIAL HYPERTENSION: ICD-10-CM

## 2020-08-13 LAB
A/G RATIO: 1.8 (ref 1.1–2.2)
ALBUMIN SERPL-MCNC: 4.5 G/DL (ref 3.4–5)
ALP BLD-CCNC: 65 U/L (ref 40–129)
ALT SERPL-CCNC: 42 U/L (ref 10–40)
ANION GAP SERPL CALCULATED.3IONS-SCNC: 15 MMOL/L (ref 3–16)
AST SERPL-CCNC: 25 U/L (ref 15–37)
BILIRUB SERPL-MCNC: 0.5 MG/DL (ref 0–1)
BUN BLDV-MCNC: 13 MG/DL (ref 7–20)
CALCIUM SERPL-MCNC: 9.3 MG/DL (ref 8.3–10.6)
CHLORIDE BLD-SCNC: 106 MMOL/L (ref 99–110)
CHOLESTEROL, TOTAL: 183 MG/DL (ref 0–199)
CO2: 21 MMOL/L (ref 21–32)
CREAT SERPL-MCNC: 0.8 MG/DL (ref 0.9–1.3)
GFR AFRICAN AMERICAN: >60
GFR NON-AFRICAN AMERICAN: >60
GLOBULIN: 2.5 G/DL
GLUCOSE BLD-MCNC: 88 MG/DL (ref 70–99)
HDLC SERPL-MCNC: 35 MG/DL (ref 40–60)
LDL CHOLESTEROL CALCULATED: 118 MG/DL
POTASSIUM SERPL-SCNC: 4.2 MMOL/L (ref 3.5–5.1)
SODIUM BLD-SCNC: 142 MMOL/L (ref 136–145)
TOTAL PROTEIN: 7 G/DL (ref 6.4–8.2)
TRIGL SERPL-MCNC: 151 MG/DL (ref 0–150)
VLDLC SERPL CALC-MCNC: 30 MG/DL

## 2020-08-13 PROCEDURE — 99396 PREV VISIT EST AGE 40-64: CPT | Performed by: FAMILY MEDICINE

## 2020-08-13 ASSESSMENT — ENCOUNTER SYMPTOMS
BLOOD IN STOOL: 1
RHINORRHEA: 0
DIARRHEA: 0
SHORTNESS OF BREATH: 0
SORE THROAT: 0
COUGH: 0
EYE PAIN: 0
CONSTIPATION: 0
ANAL BLEEDING: 1
VOMITING: 0
ABDOMINAL PAIN: 0
COLOR CHANGE: 0
NAUSEA: 0

## 2020-08-13 NOTE — PROGRESS NOTES
Chief Complaint   Patient presents with    Annual Exam    Hypertension    Nicotine Dependence    Obesity       HPI:  Mi Russo is a 38 y.o. (: 1975) here today for an adult wellness examination and multiple medical problems. Well Adult Physical: Mi Russo is here for a comprehensive physical exam. He reports no problems    Do you take any herbs or supplements that were not prescribed by a doctor? no  Are you taking calcium supplements? no   Are you taking aspirin daily? no    He  is not exercising. He is not working on managing his diet. He admits he eats throughout the day and sometimes makes poor choices regarding foods. He has a goal weight 250lbs    He has other goals: To live along enough to watch his grandson grow up. Review of Systems   Constitutional: Negative for chills and fever. HENT: Negative for ear pain, rhinorrhea and sore throat. Eyes: Negative for pain and visual disturbance. Respiratory: Negative for cough and shortness of breath. Cardiovascular: Negative for chest pain and palpitations. Gastrointestinal: Positive for anal bleeding and blood in stool. Negative for abdominal pain, constipation, diarrhea, nausea and vomiting. Genitourinary: Negative for dysuria and frequency. Musculoskeletal: Negative for joint swelling and myalgias. Skin: Negative for color change and rash. Neurological: Negative for weakness, numbness and headaches. Hematological: Negative for adenopathy. Does not bruise/bleed easily. Psychiatric/Behavioral: Negative for dysphoric mood, self-injury and suicidal ideas. The patient is not nervous/anxious.           Allergies   Allergen Reactions    Ibuprofen      kidneys    Codeine Rash     New Prescriptions    No medications on file       Meds Prior to visit:  Current Outpatient Medications on File Prior to Visit   Medication Sig Dispense Refill    metoprolol tartrate (LOPRESSOR) 25 MG tablet TAKE ONE TABLET BY MOUTH TWICE A DAY 60 tablet 0    gabapentin (NEURONTIN) 800 MG tablet Take 1 tablet by mouth 3 times daily for 120 days. (Patient not taking: Reported on 2020) 90 tablet 3     No current facility-administered medications on file prior to visit. Past Medical History:   Diagnosis Date    CKD (chronic kidney disease)     HTN (hypertension)     Polycystic kidney     Dr. Evelyne Day    PSVT (parox[facility]mal supraventricular tachycardia) ([facility] [address]     Stage 2 chronic kidney disease 2017     Past Surgical History:   Procedure Laterality Date    CARDIAC CATHETERIZATION     Jolanta Walton      Dr. Agnes Russo - JENNY flank hematoma removal     Family History   Problem Relation Age of Onset    Diabetes Son    [facility] Other Mother          with pericardial effusion    Other Father         AW     Social History     Tobacco Use    Smoking status: Current Every Day Smoker     Packs/day: 1.00     Types: Cigarettes    Smokeless tobacco: Never Used    Tobacco comment: contemplating   Substance Use Topics    Alcohol use: No     Alcohol/week: 0.0 standard drinks    Drug use: No       Objective   /80   Pulse 78   Temp 97.8 °F (36.6 °C)   Ht 5' 11\" (1.803 m)   Wt (!) 301 lb (136.5 kg)   BMI 41.98 kg/m²   Wt Readings from Last 3 Encounters:   20 (!) 301 lb (136.5 kg)   20 290 lb (131.5 kg)   19 (!) 301 lb (136.5 kg)       Physical Exam  Vitals signs reviewed. Constitutional:       Appearance: He is well-developed. HENT:      Head: Normocephalic and atraumatic. Nose: Nose normal.      Mouth/Throat:      Pharynx: No oropharyngeal exudate. Comments: Class 4 Airway  Eyes:      General: No scleral icterus. Right eye: No discharge. Left eye: No discharge. Pupils: Pupils are equal, round, and reactive to light. Neck:      Musculoskeletal: Normal range of motion and neck supple. Thyroid: No thyromegaly. Cardiovascular:      Rate and Rhythm: Normal rate and regular rhythm. Pulses:           Dorsalis pedis pulses are 2+ on the right side and 2+ on the left side. Posterior tibial pulses are 2+ on the right side and 2+ on the left side. Heart sounds: Normal heart sounds. No murmur. No friction rub. No gallop. Comments: No Edema Lower Extremities  Pulmonary:      Effort: Pulmonary effort is normal.      Breath sounds: Normal breath sounds. No wheezing or rales. Abdominal:      General: Bowel sounds are normal. There is no distension. Palpations: Abdomen is soft. There is no hepatomegaly or splenomegaly. Tenderness: There is no abdominal tenderness. There is no guarding or rebound. Musculoskeletal: Normal range of motion. General: No tenderness or deformity. Lymphadenopathy:      Cervical: No cervical adenopathy. Skin:     General: Skin is warm and dry. Findings: No erythema or rash. Neurological:      Mental Status: He is alert. Cranial Nerves: No cranial nerve deficit. Sensory: No sensory deficit.       Gait: Gait normal.   Psychiatric:         Speech: Speech normal.         Behavior: Behavior normal.           Chemistry        Component Value Date/Time     06/09/2020 1305    K 4.0 06/09/2020 1305    K 4.1 07/15/2018 2007     06/09/2020 1305    CO2 23 06/09/2020 1305    BUN 11 06/09/2020 1305    CREATININE 0.7 (L) 06/09/2020 1305        Component Value Date/Time    CALCIUM 9.3 06/09/2020 1305    ALKPHOS 64 06/09/2020 1305    AST 29 06/09/2020 1305    ALT 35 06/09/2020 1305    BILITOT 0.9 06/09/2020 1305          Lab Results   Component Value Date    WBC 9.4 06/09/2020    HGB 17.8 (H) 06/09/2020    HCT 51.6 06/09/2020    MCV 92.3 06/09/2020     06/09/2020     No results found for: LABA1C  No results found for: EAG  No results found for: LABA1C  No components found for: CHLPL  Lab Results   Component Value Date    TRIG 232 (H) 08/15/2016     Lab Results   Component Value Date    HDL 28 (L) 08/15/2016 Lab Results   Component Value Date    LDLCALC 94 08/15/2016     Lab Results   Component Value Date    LABVLDL 46 08/15/2016         Assessment   Plan     1. Well adult exam  Appears well:  Counselled diet, development, anticipatory guidance and safety issues with patient and or parent(s). 2. Essential hypertension  Controlled: Appears stable. We will continue current management and monitor for adverse reaction and disease progression. Follow-up as noted below    - Lipid Panel; Future  - Comprehensive Metabolic Panel; Future  - metoprolol tartrate (LOPRESSOR) 25 MG tablet; TAKE ONE TABLET BY MOUTH TWICE A DAY  Dispense: 60 tablet; Refill: 5    3. Tobacco abuse  Spent 5 minutes coaching on smoking cessation. 4. Class 3 severe obesity due to excess calories without serious comorbidity with body mass index (BMI) of 40.0 to 44.9 in adult [facility])  Counseled on diet and exercise. Motivational interviewing and removal of barriers for good habits. 5. Rectal bleeding  We will send for colonoscopy. Patient reports she has several cousins and aunts with colon cancer as well  - Howard Marrero MD, Gastroenterology, Louie Caldera received counseling on the following healthy behaviors: nutrition, exercise and tobacco cessation    Patient given educational materials on Smoking Cessation, Nutrition and Exercise  Discussed use, benefit, and side effects of prescribed medications. Barriers to medication compliance addressed. All patient questions answered. Pt voiced understanding. RTC Return in 24 weeks (on 1/28/2021), or HTN, for HTN.     Scribe attestation:  Eve Aleman MA , am scribing for and in the presence of Rene Lawson MD. Electronically signed by Rene Lawson MD on 8/13/2020 at 9:34 AM          Provider attestation: Elva Mckay MD  personally performed the services described in this documentation, as scribed by the user listed above in my presence, and it is both accurate and complete. I agree with the Chief Complaint, ROS, and Past Histories independently gathered by the clinical support staff and the remaining scribed note accurately describes my personal service to the patient.     8/13/2020    9:34 AM

## 2020-08-13 NOTE — PATIENT INSTRUCTIONS
Giorgi Prieto MD  [address], [address] [place] [address]   Phone: [phone]      COUNT THE NUMBERS OF THINGS THAT GET WORSE IF YOU MAKE A BAD FOOD CHOICE      Examine your lifestyle and the barriers to bad and good habits and how you can design your life to make better choices    If you want to feel better these are the FUNDAMENTAL PILLARS of Wellness:    Make it EASY to do the RIGHT THINGS. 1)  You can choose to Get 150 min/week of moderate exercise (can talk but can't sing) or 75 min/week of vigorous exercise (can't talk)   This will enhance your sense of well being (Exercise is as good as medicine for depression.)    2)  You can choose to Get 7-9 hours of sleep per night    Detoxifies your brain, reduces risk of dementia    3)  You can choose to Strength Train 2 x a week on non-consecutive days   This will improve function and reduce risk of injury. Body weight type exercises such as Yoga and Pilates are good    4)  You can choose good nutrition. Only eat your goal weight (in lbs) x 10 calories/day and get 5 servings of Vegetables/day   Plant based diets reduce risk of heart attack/stroke and will help you feel full on less food. Avoid highly processed foods and processed carbohydrates. 5)  You can choose moderate alcohol intake < 1-2 drinks/day   Alcohol will disrupt your sleep and add calories to your day    6)  You can choose to develop a Charismatic/Supportive relationship. This will strengthen your resilience for the ups and downs. 7)  You can choose to Practice Mindfulness. An hour a day of prayer/meditation/gratitude will change your life! Here are some recommendations for weight loss:  Check into The GI Diet or \"Primal diet\", Intermittent fasting. Take your desired weight in pounds and multiply by 10 and that is your average daily calorie allowance.   For example if you wish to weigh 170 lb x 10 = 1700 lynn/day (this is how to gradually lose the weight and maintain your desired weight). Avoid soda/coke and all \"wet carbs\" => Drink ice water instead    Drink a large glass of ice water before meals and EAT SLOWLY (talk while you eat)! Rethink your hunger => it means your losing weight. Minimize carbohydrates as they stimulate your appetite.   Avoid Bread, Rice, Pasta and Potatoes      Avoid eating calories after 6 pm  Try taking your calories only in 6 hours of the day - fast the rest of the day

## 2020-08-19 ENCOUNTER — OFFICE VISIT (OUTPATIENT)
Dept: PRIMARY CARE CLINIC | Age: 45
End: 2020-08-19
Payer: COMMERCIAL

## 2020-08-19 PROCEDURE — 99211 OFF/OP EST MAY X REQ PHY/QHP: CPT | Performed by: NURSE PRACTITIONER

## 2020-08-19 NOTE — PROGRESS NOTES
Marianna Mason received a viral test for COVID-19. They were educated on isolation and quarantine as appropriate. For any symptoms, they were directed to seek care from their PCP, given contact information to establish with a doctor, directed to an urgent care or the emergency room.

## 2020-08-19 NOTE — PATIENT INSTRUCTIONS

## 2020-08-20 LAB — SARS-COV-2, NAA: NOT DETECTED

## 2020-09-02 ENCOUNTER — INITIAL CONSULT (OUTPATIENT)
Dept: GASTROENTEROLOGY | Age: 45
End: 2020-09-02
Payer: COMMERCIAL

## 2020-09-02 VITALS
SYSTOLIC BLOOD PRESSURE: 132 MMHG | BODY MASS INDEX: 41.04 KG/M2 | WEIGHT: 303 LBS | HEIGHT: 72 IN | TEMPERATURE: 97.6 F | DIASTOLIC BLOOD PRESSURE: 76 MMHG

## 2020-09-02 PROCEDURE — 99244 OFF/OP CNSLTJ NEW/EST MOD 40: CPT | Performed by: INTERNAL MEDICINE

## 2020-09-02 RX ORDER — POLYETHYLENE GLYCOL 3350 17 G/17G
238 POWDER ORAL DAILY
Qty: 255 G | Refills: 0 | Status: ON HOLD
Start: 2020-09-02 | End: 2020-09-15 | Stop reason: HOSPADM

## 2020-09-02 NOTE — PROGRESS NOTES
[address]    [address],  [address] [place] [address]  [place], [facility]  Phone: [phone]   [facility] [place]:[phone]    CHIEF COMPLAINT     Chief Complaint   Patient presents with    New Patient     Rectal bleeding; due for colonoscopy had one about 10 years ago. fm hx of colon cancer       HPI     Thank you Nita Prado MD for asking me to see Phong Rose in consultation. Phong Rose is a 54 y.o. male  [2] White [1] with medical history of HTN, Paraxysmal SVT, PCKD and Obesity seen independently with complaints of rectal bleeding of 1 year duration. Reports painless rectal bleeding with blood in toilet bowl. Last episode was 4 days ago. Reports regular bowel movements without straining. Denies abdominal pain, nausea, vomiting, fever, chills, unintentional weight loss, constipation, diarrhea or melenic stools. Reports family history of colon cancer in aunts and uncles but no immediate family members. Patient reports last colonoscopy was over 10 years ago. Last Encounter Reviewed: None  Pertinent PMH, FH, SH is reviewed below. Last EGD: None  Last Colonoscopy: over 10 years ago. Review of available records reveals:   Wt Readings from Last 50 Encounters:   09/02/20 (!) 303 lb (137.4 kg)   08/13/20 (!) 301 lb (136.5 kg)   06/09/20 290 lb (131.5 kg)   08/01/19 (!) 301 lb (136.5 kg)   07/28/19 295 lb (133.8 kg)   07/24/19 295 lb (133.8 kg)   03/01/19 (!) 312 lb (141.5 kg)   07/15/18 295 lb (133.8 kg)   06/14/18 (!) 307 lb (139.3 kg)   12/08/17 297 lb (134.7 kg)   10/19/17 295 lb (133.8 kg)   08/10/17 (!) 305 lb (138.3 kg)   06/09/17 (!) 305 lb (138.3 kg)   06/07/17 285 lb (129.3 kg)   08/15/16 (!) 308 lb (139.7 kg)   04/11/16 (!) 308 lb (139.7 kg)   04/04/16 (!) 308 lb (139.7 kg)       No components found for: HGBA1C  BP Readings from Last 3 Encounters:   09/02/20 132/76   08/13/20 134/80   06/09/20 (!) 141/81     Health Maintenance   Topic Date Due    HIV screen  09/01/1989    Flu vaccine (1) 2020    Potassium monitoring  2021    Creatinine monitoring  2021    Lipid screen  2025    DTaP/Tdap/Td vaccine (2 - Td) 2029    Pneumococcal 0-64 years Vaccine  Completed    Hepatitis A vaccine  Aged Out    Hepatitis B vaccine  Aged Out    Hib vaccine  Aged Out    Meningococcal (ACWY) vaccine  Aged Out       No components found for: [employer]     PAST MEDICAL HISTORY     Past Medical History:   Diagnosis Date    CKD (chronic kidney disease)     HTN (hypertension)     Polycystic kidney     Dr. Urbina Apo    [facility]     Stage 2 chronic kidney disease 2017     FAMILY HISTORY     Family History   Problem Relation Age of Onset    Diabetes Son    Ebony Be Other Mother          with pericardial effusion    Other Father         AW     SOCIAL HISTORY     Social History     Socioeconomic History    Marital status:      Spouse name: Wendy Dunham Number of children: 2    Years of education: Not on file    Highest education level: Not on file   Occupational History    Occupation:    Social Needs    Financial resource strain: Not on file    Food insecurity     Worry: Not on file     Inability: Not on file   [employer] needs     Medical: Not on file     Non-medical: Not on file   Tobacco Use    Smoking status: Current Every Day Smoker     Packs/day: 1.00     Types: Cigarettes    Smokeless tobacco: Never Used    Tobacco comment: contemplating   Substance and Sexual Activity    Alcohol use: No     Alcohol/week: 0.0 standard drinks    Drug use: No    Sexual activity: Not Currently   Lifestyle    Physical activity     Days per week: Not on file     Minutes per session: Not on file    Stress: Not on file   Relationships    Social connections     Talks on phone: Not on file     Gets together: Not on file     Attends Taoist service: Not on file     Active member of club or organization: Not on file     Attends meetings of clubs or organizations: Not on file     Relationship status: Not on file    Intimate partner violence     Fear of current or ex partner: Not on file     Emotionally abused: Not on file     Physically abused: Not on file     Forced sexual activity: Not on file   Other Topics Concern    Not on file   Social History Narrative    Not on file     SURGICAL HISTORY     Past Surgical History:   Procedure Laterality Date    CARDIAC CATHETERIZATION  2008   Mattie Denise  2001    Dr. Marylou ALVARADO flank hematoma removal     CURRENT MEDICATIONS   (This list may include medications prescribed during this encounter as epic can not insert only the list prior to this encounter.)  Current Outpatient Rx   Medication Sig Dispense Refill    bisacodyl (DULCOLAX) 5 MG EC tablet Take 4 tablets by mouth once for 1 dose Take as directed for colonoscopy. 4 tablet 0    polyethylene glycol (MIRALAX) 17 GM/SCOOP POWD powder Take 238 g by mouth daily Take as directed for colonoscopy 255 g 0    metoprolol tartrate (LOPRESSOR) 25 MG tablet TAKE ONE TABLET BY MOUTH TWICE A DAY 60 tablet 5     ALLERGIES     Allergies   Allergen Reactions    Ibuprofen      kidneys    Codeine Rash     IMMUNIZATIONS     Immunization History   Administered Date(s) Administered    Pneumococcal Polysaccharide ([id number]) 08/15/2016    Tdap (Boostrix, Adacel) 03/01/2019     REVIEW OF SYSTEMS   See HPI for further details and pertinent postiives. Negative for the following:  Constitutional: Negative for weight change. Negative for appetite change and fatigue. HENT: Negative for nosebleeds, sore throat, mouth sores, and voice change. Respiratory: Negative for cough, choking and chest tightness. Cardiovascular: Negative for chest pain   Gastrointestinal: See HPI  Musculoskeletal: Negative for arthralgias. Skin: Negative for pallor. Neurological: Negative for weakness and light-headedness.    Hematological: Negative for adenopathy. Does not bruise/bleed easily. Psychiatric/Behavioral: Negative for suicidal ideas. PHYSICAL EXAM   VITAL SIGNS: /76 (Site: Right Upper Arm, Position: Sitting, Cuff Size: Large Adult)   Temp 97.6 °F (36.4 °C) (Temporal)   Ht 6' (1.829 m)   Wt (!) 303 lb (137.4 kg)   BMI 41.09 kg/m²   Wt Readings from Last 3 Encounters:   09/02/20 (!) 303 lb (137.4 kg)   08/13/20 (!) 301 lb (136.5 kg)   06/09/20 290 lb (131.5 kg)     Constitutional: Obese male, well developed, Well nourished, No acute distress, Non-toxic appearance. HENT: Normocephalic, Atraumatic, Bilateral external ears normal, Oropharynx moist, No oral exudates, Nose normal.   Eyes: Conjunctiva normal, No discharge. Neck: Normal range of motion, No tenderness, Supple, No stridor. Lymphatic: No cervical, subclavian, or axillary lymphadenopathy. Cardiovascular: Normal heart rate, Normal rhythm, No murmurs, No rubs, No gallops. Thorax & Lungs: Normal breath sounds, No respiratory distress, No wheezing, No chest tenderness. No gynecomastia. Abdomen: Pannus abdomen, normal bowel sounds, soft, non tender, non distended, right lower abdominal surgical scar from prior hematoma surgery. Rectal:  Deferred. Skin: Warm, Dry, No erythema, No rash. No bruising. No spider hemangiomas. Back: No tenderness, No CVA tenderness. Lower Extremities: Intact distal pulses, No edema, No tenderness, No cyanosis, No clubbing. Neurologic: Alert & oriented x 3, Normal motor function, Normal sensory function, No focal deficits noted. No asterixis. RADIOLOGY/PROCEDURES   No results found. FINAL IMPRESSION   Arben Bernal was seen today for new patient. Diagnoses and all orders for this visit:    Chronic painless rectal bleeding. DDx includes but not limited to diverticular bleed, hemorrhoidal, AVMs. Less likely anal fissures. To rule out colonic neoplasm.  -     COLONOSCOPY W/ OR W/O BIOPSY  -     Covid-19 Ambulatory;  Future  - Hemoglobin and hematocrit have been stable at baseline: Last hemoglobin 17.8, hematocrit 51.6 on 6/9/2020    Colonoscopy with alternatives, rationale, benefits and risks, not limited to bleeding, infection, perforation, need of surgery, prolong hospital stay and anesthesia side effects were explained. Patient verbalized understanding and agrees to proceed with colonoscopy. Other orders  -     bisacodyl (DULCOLAX) 5 MG EC tablet; Take 4 tablets by mouth once for 1 dose Take as directed for colonoscopy. -     polyethylene glycol (MIRALAX) 17 GM/SCOOP POWD powder; Take 238 g by mouth daily Take as directed for colonoscopy        Orders Placed This Encounter   Procedures    COLONOSCOPY W/ OR W/O BIOPSY     Scheduling Instructions:      Schedule with anesthesia provided diprivan. Please provide prep of choice instructions and prescription. General guidelines for holding blood thinners/anticoagulants around endoscopic procedure are but patients are encouraged to check with their prescribing physician. The patient may hold Plavix, Effient, Brilinta 5 days prior to the procedure unless:       A drug eluting stent has been placed within past 12 months. A nondrug eluting stent has been placed within past 1 month. Coumadin may be held 4 days prior to the procedure unless:        Mechanical mitral valve replacement (requires heparin bridge while Coumadin held and is managed by pharmacy)      Pradaxa, Xarelto, Eliquis may be held 2-3 days prior to procedure. According to pharmacokinetics of the drug, package insert, cardiology practice patterns, and T1/2 of theses drugs (12 hrs), Eliquis and Xarelto are held 48hrs prior to any procedure, including major surgical procedures w/o       increased bleeding.  That is usually the standard of care, as coagulation would/should be normalized at 48hrs.       Every attempt should be made to maintain ASA 81mg per day throughout the leigh-operative period in patients with diagnosis of ASHD. These recommendations may need to be modified by the provider/ based on risk /benefit analysis of the procedure and the patients history. If anticoagulation can not be held because recent cardiac stent, elective endoscopic procedures should be delayed until they have received the minimum duration of recommended antiplatlet therapy and it can safely be held. Again if unsure, patient should discuss with prescribing physician/service. If anticoagulation can not be stopped, endoscopic procedures can still be performed either diagnostically at a somewhat higher risk. Understand that any therapeutic procedure where anything beyond looking is performed, carries higher risks. For this reason without overt bleeding other testing       such as cologuard may be more appropriate. High risk endoscopic procedures that require stopping antiplatelet and anticoagulation therapy include polypectomy, biliary or pancreatic sphincterotomy, pneumatic or bougie dilation, PEG placement, therapeutic balloon-assisted enteroscopy, EUS and FNA, tumor ablation by any technique,       cystogastrostomy,and treatment of varices.  Covid-19 Ambulatory     Standing Status:   Future     Standing Expiration Date:   9/2/2021     Scheduling Instructions:      Saline media preferred given current shortage of viral transport media but both acceptable     Order Specific Question:   Status     Answer:   Asymptomatic/Surveillance (e.g. pre-op/pre-procedure, pre-delivery, transfer)     Order Specific Question:   Reason for Test     Answer:   Upcoming elective surgery/procedure/delivery, return to work, or discharge to another facility     ORDERED [facility]     Lab Frequency Next Occurrence       FOLLOWUP   No follow-ups on file.           Paul Dimas 9/1/20 10:26 PM EDT    CC:  Terryl Sacks, MD

## 2020-09-02 NOTE — PATIENT INSTRUCTIONS
Patient Education        Colonoscopy: Before Your Procedure  What is a colonoscopy? A colonoscopy is a test that lets a doctor look inside your colon. The doctor uses a thin, lighted tube called a colonoscope to look for problems. These include small growths called polyps, cancer, or bleeding. During the test, the doctor can take samples of tissue that can be checked for cancer or other problems. This is called a biopsy. The doctor can also take out polyps. Before the test, you will need to stop eating solid foods. You also will drink a liquid or take a tablet that cleans out your colon. This helps your doctor be able to see inside your colon during the test.  Follow-up care is a key part of your treatment and safety. Be sure to make and go to all appointments, and call your doctor if you are having problems. It's also a good idea to know your test results and keep a list of the medicines you take. How do you prepare for the procedure? Procedures can be stressful. This information will help you understand what you can expect. And it will help you safely prepare for your procedure. Preparing for the procedure  · Be sure you have someone to take you home. Anesthesia and pain medicine will make it unsafe for you to drive or get home on your own. · Understand exactly what procedure is planned, along with the risks, benefits, and other options. · Tell your doctor ALL the medicines, vitamins, supplements, and herbal remedies you take. Some may increase the risk of problems during your procedure. Your doctor will tell you if you should stop taking any of them before the procedure and how soon to do it. · If you take aspirin or some other blood thinner, ask your doctor if you should stop taking it before your procedure. Make sure that you understand exactly what your doctor wants you to do. These medicines increase the risk of bleeding.   · Make sure your doctor and the hospital have a copy of your advance directive. If you don't have one, you may want to prepare one. It lets others know your health care wishes. It's a good thing to have before any type of surgery or procedure. Before the procedure  · Follow your doctor's directions about when to stop eating solid foods and drink only clear liquids. You can drink water, clear juices, clear broths, flavored ice pops, and gelatin (such as Jell-O). Do not eat or drink anything red or purple. This includes grape juice and grape-flavored ice pops. It also includes fruit punch and cherry gelatin. · Drink the \"colon prep\" liquid as your doctor tells you. You will want to stay home, because the liquid will make you go to the bathroom a lot. Your stools will be loose and watery. It's very important to drink all of the liquid. If you have problems drinking it, call your doctor. Some doctors may have you take a tablet rather than drink a liquid. · Do not eat any solid foods after you drink the colon prep. · Stop drinking clear liquids 6 to 8 hours before the test.  What happens on the day of the procedure? · Follow the instructions exactly about when to stop eating and drinking. If you don't, your procedure may be canceled. If your doctor told you to take your medicines on the day of the procedure, take them with only a sip of water. · Take a bath or shower before you come in for your procedure. Do not apply lotions, perfumes, deodorants, or nail polish. · Take off all jewelry and piercings. And take out contact lenses, if you wear them. At the [address] [place] [address] or [facility]    · Bring a picture ID. · You will be kept comfortable and safe by your anesthesia provider. The anesthesia may make you sleep. · You will lie on your back or your side with your knees drawn up toward your belly. The doctor will gently put a gloved finger into your anus. Then the doctor puts the scope in and moves it into your colon. The scope goes in easily because it is lubricated.   · The doctor drugs (NSAIDs), such as ibuprofen (Advil, Motrin) and naproxen (Aleve). They can cause you to bleed more. Ask your doctor if you can take acetaminophen (Tylenol). Read and follow all instructions on the label. · Use a stool softener that contains bran or psyllium. You can save money by buying bran or psyllium (available in bulk at most health food stores) and sprinkling it on foods or stirring it into fruit juice. You can also use a product such as Metamucil or Citrucel. · Take your medicines exactly as directed. Call your doctor if you think you are having a problem with your medicine. When should you call for help? [id number] anytime you think you may need emergency care. For example, call if:  · You passed out (lost consciousness). Call your doctor now or seek immediate medical care if:  · You have new or worse pain. · You have new or worse bleeding from the rectum. · You are dizzy or light-headed, or you feel like you may faint. Watch closely for changes in your health, and be sure to contact your doctor if:  · You cannot pass stools or gas. · You do not get better as expected. Where can you learn more? Go to [email or web address] and sign in to your [employer] account. Enter W911 in the [employer] box to learn more about \"Rectal Bleeding: Care Instructions. \"     If you do not have an account, please click on the \"Sign Up Now\" link. Current as of: August 12, 2019               Content Version: 12.5  © 0408-6006 Healthwise, Incorporated. Care instructions adapted under license by [facility] ([facility]). If you have questions about a medical condition or this instruction, always ask your healthcare professional. [place] [address] any warranty or liability for your use of this information.

## 2020-09-02 NOTE — LETTER
COLONOSCOPY PREP INSTRUCTIONS  MlRALAX SPLIT DOSE   [facility] PHYSICIAN ENDOSCOPY    Your colonoscopy is scheduled on: _9/15/20 @ 10:30am_   __Patrick/Brandon/Miesha/Lanny_  Arrival Time: __9:30am_   DO NOT EAT OR DRINK (INCLUDING WATER) AFTER: _5:30am after drinking second dose of prep_  's TABATHA HOLLOWAY [facility] Gastroenterology [phone]  [facility] Gastroenterology- [phone]    Please get tested for Covid on 9/9/20    Keep these papers together; REVIEW ALL OF THEM AT LEAST 7 DAYS BEFORE THE PROCEDURE. Please bring a current list of your medications, to avoid delays in the admission process. The following instructions must be followed in order to ensure your procedure has optimal outcomes. - KEEP YOUR APPOINTMENT. If for any reason, you are unable to keep your appointment, please notify us within 72 hours before your procedure. - You MUST have a responsible adult to drive you, who MUST remain at our facility the ENTIRE time. If not the procedure will be cancelled. You may go by taxi ONLY if you have a responsible adult with you. You may experience light headedness, dizziness etc., therefore you should have a responsible adult remain with you until the morning after your procedure. - Bring your insurance card and 's license. Call your insurance carrier to verify your benefits, and confirm that our facility is in your network, prior to the procedure date to ensure coverage. The facility name is listed as [facility] or [place] [address]  and the tax ID# is [id number].  - Due to the safety and privacy of our patients, only one visitor is allowed in the recovery area after the procedure. The center will not be responsible for lost valuables so please leave them at home. - Try to avoid seeds (strawberries, rosemarie, and rye) for one week prior to your procedure. - If you have questions after beginning the prep, call between 8:30 am & 4:30pm you will speak to a nurse or medical assistant, after 4:30pm you will reach the physician on call. - Hydration (body fluid) is the most important aspect of an effective and safe prep. If you are not drinking enough fluid you may experience cramping, nausea and dizziness. - Common side effects of the prep are nausea, bloating and shaking chills. If any of these occur, take a break from the prep (30 minutes to 1 hour) and then restart. If unable to complete after that notify the Physician as your procedure may need to be cancelled. Nausea medication or an alternative prep is sometimes called in.  - Do not leave home after starting the prep. Frequent, liquid stools may begin as soon as 15 minutes after the first bottle, although it could take up to 4 hours or longer for your first bowel movement. - Even if your stools are clear and watery in appearance, TAKE ALL OF THE PREP.  - Using baby wipes and nonprescription ointments, such as Desitin, will help with the irritation caused by frequent loose stools. - Due to unforeseen schedule changes, you may be asked to move your appointment time to an earlier/later time slot. To insure that your prep gives you the best results for your Colonoscopy, Please follow all directions closely. 3-5 days prior to your procedure:  1. Begin a low-fiber diet (no corn, dried beans, tomatoes, nuts, seeds, lettuce). 2. No bran or bulking agents. 3. Stop taking your multivitamin or iron supplements.   4. If you currently take Aggrenox, Dipyridamole, Persantine, Fondaparinux sodium (Arixtra), Dalteparin sodium Huffman Crease), Warfarin (Coumadin), Clopidogrel (Plavix), Prasugrel (Effient), Enoxaparin, Lovenox, or Heparin, Cilostazol (Pletal), Rivoroxaban (Xarelto), Desirudin (Iprivask), Cyclopentyltrazolopyrimide (Ticagrelor or Brilinta), Cangrelor (Antelmo Tatyana), Dabigatran (Pradaxa), Apixaban (Eliquis), Edoxaban (Savaysa)you should have received instructions regarding if and when to discontinue the medication. If you have not, or do not clearly understand the instructions, please call the office for clarification (number listed above). 5. Drink plenty of fluid. 1 day prior to your procedure:  1. Do not eat any SOLID FOOD, beginning with breakfast drink clear liquids only, which includes: Chicken or Beef Broth, Coffee/tea (without milk or creamer) Gatorade/PowerAde (no red or purple), JeIl-O (no red or purple), All Soda (even dark cola), Sorbet/Popsicles (no red or purple), Water If you take Diabetic medications (insulin/oral medication)-reduce the amount by one half on the morning of prep. You may resume the meds once you begin eating again. You must drink 8oz of liquids every hour to avoid dehydration. 2. If you take Diabetic medications (insulin/oral medication) - reduce the amount by one half on morning of prep. You may resume the meds once you begin eating again. 3. Bowel Cleansing Prep  ** 3:00pm Take 4 dulcolax (bisacodyl) tablets with a full glass of water  ** 5:00pm Mix one bottle of Miralax (polyethlene glycol) (238/255gm) in one bottle of Gatorade (64oz). Shake until dissolved. Drink 8 oz every 15 minutes until you have finished half of the solution. MORNING OF PROCEDURE  1. __5:30am__ (5 hours prior to the procedure) Drink the remaining portion of the solution 8 oz. every 15 minutes until completely finished, followed by 8 oz of any of the approved liquids. 2. Take your Blood pressure, Heart and Seizure medication the morning of the procedure with sips of water. 3. Bring inhalers with you. 4. Do not take your Diabetic medication the morning of procedure. 5. You must have a  stay with you during the entire procedure.         Dear Patient,      Vic Kwadwo will receive a call from the [facility] pre-registration department prior Anesthesia: __Yes__  Time Needed: 30 minutes  Pt Position:  lateral, right side up         Outpatient _X_                                    _X__PREP:  Miralax                           _____Cardiac Clearance by; ___________           Medications to be stopped 5 days before procedure: _________  Additional / Special Orders:                                                                                                   Insurance: [employer]              ID #   [id number]              Ph #     (secondary ?)                                  _x___E-mailed 9/2/20     _x__ Spoke to Pt / Spouse                                                                  Luis Miguel Long    1975                                                    Endoscopy Order   IN ACCORDANCE WITH OUR FORMULARY SYSTEM, A GENERIC EQUIVALENT DRUG MAY BE DISPNSED AND ADMINISTERED UNLESS D. A. W. IS WRITTEN WITH THE MEDICATION ORDER.   DATE HOUR PHYSICIAN:  RECORD DATE, HOUR AND SIGN EACH ENTRY   9/15/20 1030am 1)  Admit for:   [x]Colonoscopy  []EGD     [x]Anesthesia/MAC        []ERCP     []Upper EUS     []Lower EUS                             2)  Diagnosis: K62.5     3)  Establish IV access     Solution:  []0.9 Normal Saline   [x]Lactated Ringers    []Other:                            Rate:   [x]KVO      []Other:     4)  Check blood sugar on all diabetic patients       Urine Pregnancy test on all menstruating females     5)  Labs:       []PT/INR         []Platelet       []Other:____________     6)  Procedure Medications:     []Fentanyl ______micrograms IV   []Demerol ________mg IV     []Versed _______mg IV                          []Other:     7) [x]Dinemap      [x]Pulse Oximetry    [x]Cardiac Monitor     8)  Post Procedure:       [x]Titrate O2 to keep O2 Sat.  > 90%     [x]Discharge instructions per                                                                     Endoscopy Protocol     [x]Discharge home when awake and vital signs stable

## 2020-09-09 ENCOUNTER — HOSPITAL ENCOUNTER (OUTPATIENT)
Age: 45
Discharge: HOME | End: 2020-09-09
Payer: COMMERCIAL

## 2020-09-09 PROCEDURE — U0003 INFECTIOUS AGENT DETECTION BY NUCLEIC ACID (DNA OR RNA); SEVERE ACUTE RESPIRATORY SYNDROME CORONAVIRUS 2 (SARS-COV-2) (CORONAVIRUS DISEASE [COVID-19]), AMPLIFIED PROBE TECHNIQUE, MAKING USE OF HIGH THROUGHPUT TECHNOLOGIES AS DESCRIBED BY CMS-2020-01-R: HCPCS

## 2020-09-11 LAB — SARS-COV-2, NAA: NOT DETECTED

## 2020-09-14 ENCOUNTER — ANESTHESIA EVENT (OUTPATIENT)
Dept: ENDOSCOPY | Age: 45
End: 2020-09-14
Payer: COMMERCIAL

## 2020-09-14 NOTE — PROGRESS NOTES
PAT call for Endoscopy procedure scheduled for 9/15/2020. No answer, busy signal, unable to leave message.

## 2020-09-15 ENCOUNTER — ANESTHESIA (OUTPATIENT)
Dept: ENDOSCOPY | Age: 45
End: 2020-09-15
Payer: COMMERCIAL

## 2020-09-15 ENCOUNTER — HOSPITAL ENCOUNTER (OUTPATIENT)
Age: 45
Setting detail: OUTPATIENT SURGERY
Discharge: HOME | End: 2020-09-15
Attending: INTERNAL MEDICINE | Admitting: INTERNAL MEDICINE
Payer: COMMERCIAL

## 2020-09-15 VITALS
SYSTOLIC BLOOD PRESSURE: 138 MMHG | DIASTOLIC BLOOD PRESSURE: 72 MMHG | HEIGHT: 71 IN | HEART RATE: 70 BPM | TEMPERATURE: 97.1 F | WEIGHT: 300 LBS | OXYGEN SATURATION: 95 % | BODY MASS INDEX: 42 KG/M2 | RESPIRATION RATE: 16 BRPM

## 2020-09-15 VITALS
SYSTOLIC BLOOD PRESSURE: 150 MMHG | RESPIRATION RATE: 20 BRPM | DIASTOLIC BLOOD PRESSURE: 89 MMHG | OXYGEN SATURATION: 93 %

## 2020-09-15 PROCEDURE — 7100000011 HC PHASE II RECOVERY - ADDTL 15 MIN: Performed by: INTERNAL MEDICINE

## 2020-09-15 PROCEDURE — 2500000003 HC RX 250 WO HCPCS: Performed by: NURSE ANESTHETIST, CERTIFIED REGISTERED

## 2020-09-15 PROCEDURE — 2709999900 HC NON-CHARGEABLE SUPPLY: Performed by: INTERNAL MEDICINE

## 2020-09-15 PROCEDURE — 3700000000 HC ANESTHESIA ATTENDED CARE: Performed by: INTERNAL MEDICINE

## 2020-09-15 PROCEDURE — 3609010300 HC COLONOSCOPY W/BIOPSY SINGLE/MULTIPLE: Performed by: INTERNAL MEDICINE

## 2020-09-15 PROCEDURE — 6360000002 HC RX W HCPCS: Performed by: NURSE ANESTHETIST, CERTIFIED REGISTERED

## 2020-09-15 PROCEDURE — 45384 COLONOSCOPY W/LESION REMOVAL: CPT | Performed by: INTERNAL MEDICINE

## 2020-09-15 PROCEDURE — 7100000010 HC PHASE II RECOVERY - FIRST 15 MIN: Performed by: INTERNAL MEDICINE

## 2020-09-15 PROCEDURE — 88305 TISSUE EXAM BY PATHOLOGIST: CPT

## 2020-09-15 PROCEDURE — 3700000001 HC ADD 15 MINUTES (ANESTHESIA): Performed by: INTERNAL MEDICINE

## 2020-09-15 PROCEDURE — 2580000003 HC RX 258: Performed by: INTERNAL MEDICINE

## 2020-09-15 RX ORDER — SODIUM CHLORIDE, SODIUM LACTATE, POTASSIUM CHLORIDE, CALCIUM CHLORIDE 600; 310; 30; 20 MG/100ML; MG/100ML; MG/100ML; MG/100ML
INJECTION, SOLUTION INTRAVENOUS CONTINUOUS
Status: DISCONTINUED | OUTPATIENT
Start: 2020-09-15 | End: 2020-09-15 | Stop reason: HOSPADM

## 2020-09-15 RX ORDER — LIDOCAINE HYDROCHLORIDE 20 MG/ML
INJECTION, SOLUTION INFILTRATION; PERINEURAL PRN
Status: DISCONTINUED | OUTPATIENT
Start: 2020-09-15 | End: 2020-09-15 | Stop reason: SDUPTHER

## 2020-09-15 RX ORDER — PROPOFOL 10 MG/ML
INJECTION, EMULSION INTRAVENOUS PRN
Status: DISCONTINUED | OUTPATIENT
Start: 2020-09-15 | End: 2020-09-15 | Stop reason: SDUPTHER

## 2020-09-15 RX ADMIN — PROPOFOL 400 MG: 10 INJECTION, EMULSION INTRAVENOUS at 10:42

## 2020-09-15 RX ADMIN — PROPOFOL 50 MG: 10 INJECTION, EMULSION INTRAVENOUS at 11:00

## 2020-09-15 RX ADMIN — PROPOFOL 100 MG: 10 INJECTION, EMULSION INTRAVENOUS at 10:53

## 2020-09-15 RX ADMIN — LIDOCAINE HYDROCHLORIDE 50 MG: 20 INJECTION, SOLUTION INFILTRATION; PERINEURAL at 10:42

## 2020-09-15 RX ADMIN — SODIUM CHLORIDE, POTASSIUM CHLORIDE, SODIUM LACTATE AND CALCIUM CHLORIDE: 600; 310; 30; 20 INJECTION, SOLUTION INTRAVENOUS at 10:42

## 2020-09-15 ASSESSMENT — PAIN - FUNCTIONAL ASSESSMENT: PAIN_FUNCTIONAL_ASSESSMENT: 0-10

## 2020-09-15 ASSESSMENT — PAIN SCALES - GENERAL: PAINLEVEL_OUTOF10: 0

## 2020-09-15 NOTE — H&P
[facility]    [address] ,  [address] [place] [address]  [facility]  Phone: [phone]   AWX:[phone]    CHIEF COMPLAINT     Rectal bleeding    FRANCOIS Akers is a 54 y.o. male  [2] White [1] with medical history of HTN, Paraxysmal SVT, PCKD and Obesity seen independently with complaints of rectal bleeding of 1 year duration. Reports painless rectal bleeding with blood in toilet bowl. Last episode was 4 days ago. Reports regular bowel movements without straining. Denies abdominal pain, nausea, vomiting, fever, chills, unintentional weight loss, constipation, diarrhea or melenic stools. Reports family history of colon cancer in aunts and uncles but no immediate family members. Patient reports last colonoscopy was over 10 years ago.     Last Encounter Reviewed: None  Pertinent PMH, FH, SH is reviewed below. Last EGD: None  Last Colonoscopy: over 10 years ago. Review of available records reveals:   Wt Readings from Last 50 Encounters:   09/02/20 (!) 303 lb (137.4 kg)   08/13/20 (!) 301 lb (136.5 kg)   06/09/20 290 lb (131.5 kg)   08/01/19 (!) 301 lb (136.5 kg)   07/28/19 295 lb (133.8 kg)   07/24/19 295 lb (133.8 kg)   03/01/19 (!) 312 lb (141.5 kg)   07/15/18 295 lb (133.8 kg)   06/14/18 (!) 307 lb (139.3 kg)   12/08/17 297 lb (134.7 kg)   10/19/17 295 lb (133.8 kg)   08/10/17 (!) 305 lb (138.3 kg)   06/09/17 (!) 305 lb (138.3 kg)   06/07/17 285 lb (129.3 kg)   08/15/16 (!) 308 lb (139.7 kg)   04/11/16 (!) 308 lb (139.7 kg)   04/04/16 (!) 308 lb (139.7 kg)       No components found for: HGBA1C  BP Readings from Last 3 Encounters:   09/02/20 132/76   08/13/20 134/80   06/09/20 (!) 141/81     Health Maintenance   Topic Date Due    HIV screen  09/01/1989    Flu vaccine (1) 09/01/2020    Potassium monitoring  08/13/2021    Creatinine monitoring  08/13/2021    Lipid screen  08/13/2025    DTaP/Tdap/Td vaccine (2 - Td) 03/01/2029    Pneumococcal 0-64 years Vaccine Completed    Hepatitis A vaccine  Aged Out    Hepatitis B vaccine  Aged Out    Hib vaccine  Aged Out    Meningococcal (ACWY) vaccine  Aged Out       No components found for: [place]     PAST MEDICAL HISTORY     Past Medical History:   Diagnosis Date    CKD (chronic kidney disease)     HTN (hypertension)     Polycystic kidney     Dr. [facility] Husbands    PSVT (paroxysmal supraventricular tachycardia) ([facility] [address]     Stage 2 chronic kidney disease 2017     FAMILY HISTORY     Family History   Problem Relation Age of Onset    Diabetes Son    Cynthia Gonzalez Other Mother          with pericardial effusion    Other Father         AW     SOCIAL HISTORY     Social History     Socioeconomic History    Marital status:      Spouse name: yCril Bond Number of children: 2    Years of education: Not on file    Highest education level: Not on file   Occupational History    Occupation:    Social Needs    Financial resource strain: Not on file    Food insecurity     Worry: Not on file     Inability: Not on file   [employer] needs     Medical: Not on file     Non-medical: Not on file   Tobacco Use    Smoking status: Current Every Day Smoker     Packs/day: 1.00     Types: Cigarettes    Smokeless tobacco: Never Used    Tobacco comment: contemplating   Substance and Sexual Activity    Alcohol use: No     Alcohol/week: 0.0 standard drinks    Drug use: No    Sexual activity: Not Currently   Lifestyle    Physical activity     Days per week: Not on file     Minutes per session: Not on file    Stress: Not on file   Relationships    Social connections     Talks on phone: Not on file     Gets together: Not on file     Attends Temple service: Not on file     Active member of club or organization: Not on file     Attends meetings of clubs or organizations: Not on file     Relationship status: Not on file    Intimate partner violence     Fear of current or ex partner: Not on file     Emotionally abused: Not on developed, Well nourished, No acute distress, Non-toxic appearance. HENT: Normocephalic, Atraumatic, Bilateral external ears normal, Oropharynx moist, No oral exudates, Nose normal.   Eyes: Conjunctiva normal, No discharge. Neck: Normal range of motion, No tenderness, Supple, No stridor. Lymphatic: No cervical, subclavian, or axillary lymphadenopathy. Cardiovascular: Normal heart rate, Normal rhythm, No murmurs, No rubs, No gallops. Thorax & Lungs: Normal breath sounds, No respiratory distress, No wheezing, No chest tenderness. No gynecomastia. Abdomen: Pannus abdomen, normal bowel sounds, soft, non tender, non distended, scars consistent with stated surgeries, no hernias   Rectal:  Deferred. Skin: Warm, Dry, No erythema, No rash. No bruising. No spider hemangiomas. Back: No tenderness, No CVA tenderness. Lower Extremities: Intact distal pulses, No edema, No tenderness, No cyanosis, No clubbing. Neurologic: Alert & oriented x 3, Normal motor function, Normal sensory function, No focal deficits noted. No asterixis. RADIOLOGY/PROCEDURES   No results found. FINAL IMPRESSION   Chronic painless rectal bleeding. DDx includes but not limited to diverticular bleed, hemorrhoidal, AVMs. Less likely anal fissures. To rule out colonic neoplasm.  -     COLONOSCOPY W/ OR W/O BIOPSY  -     Covid-19 Ambulatory; Future  -      Hemoglobin and hematocrit have been stable at baseline: Last hemoglobin 17.8, hematocrit 51.6 on 6/9/2020     Colonoscopy with alternatives, rationale, benefits and risks, not limited to bleeding, infection, perforation, need of surgery, prolong hospital stay and anesthesia side effects were explained. Patient verbalized understanding and agrees to proceed with colonoscopy. ORDERED FUTURE/PENDING TESTS     Lab Frequency Next Occurrence       FOLLOWUP   No follow-ups on file.           Ry [place] 9/15/20 6:25 AM EDT    CC:  Osiel Fabian MD

## 2020-09-15 NOTE — ANESTHESIA PRE PROCEDURE
Department of Anesthesiology  Preprocedure Note       Name:  Mohit Weathers   Age:  54 y.o.  :  1975                                          MRN:  [id number]         Date:  9/15/2020      Surgeon: Aaron Cano):  Sulema Ross MD    Procedure: Procedure(s):  COLON W/ANES. (10:30)    Medications prior to admission:   Prior to Admission medications    Medication Sig Start Date End Date Taking? Authorizing Provider   polyethylene glycol (MIRALAX) 17 GM/SCOOP POWD powder Take 238 g by mouth daily Take as directed for colonoscopy 20  Yes Sulema Ross MD   metoprolol tartrate (LOPRESSOR) 25 MG tablet TAKE ONE TABLET BY MOUTH TWICE A DAY 20   Vinny Valadez MD       Current medications:    Current Facility-Administered Medications   Medication Dose Route Frequency Provider Last Rate Last Dose    lactated ringers infusion   Intravenous Continuous Sulema Ross MD           Allergies: Allergies   Allergen Reactions    Ibuprofen      kidneys    Codeine Rash       Problem List:    Patient Active Problem List   Diagnosis Code    SVT [facility]ra[facility] tachycardia) ([facility]) I47.1    Tobacco abuse Z72.0    Essential hypertension I10    Stage 2 chronic kidney disease N18.2    Atrial flutter (HCC) I48.92    Polycystic kidney Q61.3       Past Medical History:        Diagnosis Date    CKD (chronic kidney disease)     HTN (hypertension)     Polycystic kidney     Dr. Shania Snow    [facility]     Stage 2 chronic kidney disease 2017       Past Surgical History:        Procedure Laterality Date    CARDIAC CATHETERIZATION     Edenilson Thompson      Dr. Angelo Re - R flank hematoma removal       Social History:    Social History     Tobacco Use    Smoking status: Current Every Day Smoker     Packs/day: 1.00     Types: Cigarettes    Smokeless tobacco: Never Used    Tobacco comment: contemplating   Substance Use Topics    Alcohol use:  No Alcohol/week: 0.0 standard drinks                                Ready to quit: Not Answered  Counseling given: Not Answered  Comment: contemplating      Vital Signs (Current):   Vitals:    09/15/20 0915   BP: 138/89   Pulse: 70   Resp: 14   Temp: 97.7 °F (36.5 °C)   TempSrc: Temporal   SpO2: 95%   Weight: 300 lb (136.1 kg)   Height: 5' 11\" (1.803 m)                                              BP Readings from Last 3 Encounters:   09/15/20 138/89   09/02/20 132/76   08/13/20 134/80       NPO Status: Time of last liquid consumption: 0500                        Time of last solid consumption: 1900                        Date of last liquid consumption: 09/15/20                        Date of last solid food consumption: 09/13/20    BMI:   Wt Readings from Last 3 Encounters:   09/15/20 300 lb (136.1 kg)   09/02/20 (!) 303 lb (137.4 kg)   08/13/20 (!) 301 lb (136.5 kg)     Body mass index is 41.84 kg/m². CBC:   Lab Results   Component Value Date    WBC 9.4 06/09/2020    RBC 5.59 06/09/2020    HGB 17.8 06/09/2020    HCT 51.6 06/09/2020    MCV 92.3 06/09/2020    RDW 13.1 06/09/2020     06/09/2020       CMP:   Lab Results   Component Value Date     08/13/2020    K 4.2 08/13/2020    K 4.1 07/15/2018     08/13/2020    CO2 21 08/13/2020    BUN 13 08/13/2020    CREATININE 0.8 08/13/2020    GFRAA >60 08/13/2020    GFRAA >60 06/11/2013    AGRATIO 1.8 08/13/2020    LABGLOM >60 08/13/2020    GLUCOSE 88 08/13/2020    PROT 7.0 08/13/2020    PROT 7.6 09/21/2010    CALCIUM 9.3 08/13/2020    BILITOT 0.5 08/13/2020    ALKPHOS 65 08/13/2020    AST 25 08/13/2020    ALT 42 08/13/2020       POC Tests: No results for input(s): POCGLU, POCNA, POCK, POCCL, POCBUN, POCHEMO, POCHCT in the last 72 hours.     Coags:   Lab Results   Component Value Date    PROTIME 11.1 10/19/2017    INR 0.98 10/19/2017       HCG (If Applicable): No results found for: PREGTESTUR, PREGSERUM, HCG, HCGQUANT     ABGs: No results found for: PHART, PO2ART, NNK9ZVB, HUH1OMO, BEART, D2WKJJNC     Type & Screen (If Applicable):  No results found for: LABABO, LABRH    Drug/Infectious Status (If Applicable):  No results found for: HIV, HEPCAB    COVID-19 Screening (If Applicable):   Lab Results   Component Value Date    COVID19 NOT DETECTED 09/09/2020         Anesthesia Evaluation  Patient summary reviewed and Nursing notes reviewed  Airway: Mallampati: II  TM distance: <3 FB   Neck ROM: full  Mouth opening: > = 3 FB Dental: normal exam         Pulmonary:Negative Pulmonary ROS and normal exam  breath sounds clear to auscultation                             Cardiovascular:    (+) hypertension:,         Rhythm: regular  Rate: normal                 ROS comment: SVT     Neuro/Psych:   Negative Neuro/Psych ROS              GI/Hepatic/Renal:   (+) renal disease: CRI, morbid obesity          Endo/Other: Negative Endo/Other ROS                    Abdominal:   (+) obese,         Vascular: negative vascular ROS. Anesthesia Plan      MAC     ASA 3       Induction: intravenous. Anesthetic plan and risks discussed with patient. Plan discussed with CRNA.                   Aleyda Rios MD   9/15/2020

## 2020-09-15 NOTE — PROGRESS NOTES
Assessment unchanged from previous. Verbalizes understanding of discharge instructions and written instructions also given to patient. Questions and concerns addressed at this time. Denies pain or any needs at this time. IV d/c'd. Pt discharged via wheelchair to car in good condition. Discharge instructions gone over with and given to pt's wife. All personal belongings returned to pt.

## 2020-09-15 NOTE — ANESTHESIA POSTPROCEDURE EVALUATION
Department of Anesthesiology  Postprocedure Note    Patient: Analy Mir  MRN: [id number]  YOB: 1975  Date of evaluation: 9/15/2020  Time:  12:57 PM     Procedure Summary     Date:  09/15/20 Room / Location:  [address]    Anesthesia Start:  1037 Anesthesia Stop:  1108    Procedure:  COLONOSCOPY WITH BIOPSY (N/A ) Diagnosis:       Rectal bleeding      (RECTAL BLEEDING)    Surgeon:  India Stoddard MD Responsible Provider:  Kwasi Collins MD    Anesthesia Type:  MAC ASA Status:  3          Anesthesia Type: MAC    Ruben Phase I: Ruben Score: 10    Ruben Phase II: Ruben Score: 10    Last vitals: Reviewed and per EMR flowsheets.        Anesthesia Post Evaluation    Patient location during evaluation: PACU  Patient participation: complete - patient participated  Level of consciousness: awake and alert  Pain score: 0  Airway patency: patent  Nausea & Vomiting: no nausea and no vomiting  Complications: no  Cardiovascular status: blood pressure returned to baseline  Respiratory status: acceptable  Hydration status: stable

## 2020-09-15 NOTE — OP NOTE
[address] ,  [address] [facility]  Phone: [phone] [facility],  [address] [facility], [address] [place] [address]  Phone: [phone]   NFV:[phone]    Colonoscopy Procedure Note    Patient: Ruth Gaytan  : 1975    Procedure: Colonoscopy --diagnostic    Date:  9/15/2020     Endoscopist:  Last Posey MD    Referring Physician:  Pam Avila MD    Preoperative Diagnosis:  Rectal bleeding [K62.5]    Postoperative Diagnosis:  See impression    Anesthesia: Anesthesia: MAC  Sedation: per anesthesia  Start Time: 10:46  Stop Time: 11:03  ASA Class: 3  Mallampati: III (soft palate, base of uvula visible)    Indications: This is a 54y.o. year old male who presents today with rectal bleeding. Procedure Details  Informed consent was obtained for the procedure, including sedation. Risks of perforation, hemorrhage, adverse drug reaction and aspiration were discussed. The patient was placed in the left lateral decubitus position. Based on the pre-procedure assessment, including review of the patient's medical history, medications, allergies, and review of systems, he had been deemed to be an appropriate candidate for sedation; he was therefore sedated with the medications listed below. The patient was monitored continuously with ECG tracing, pulse oximetry, blood pressure monitoring, and direct observations. rectal examination was performed. There were no external hemorrhoids, fissures or skin tags. The colonoscope was inserted into the rectum and advanced under direct vision to the cecum, which was identified by the ileocecal valve and appendiceal orifice. The right colon was examined twice as this increases polyp detection especially if other right colon polyps, older age, male, or hutson syndrome. When segments could not be distended with CO2 or air, it was filled/distended with water.   The quality of the colonic preparation was good.  A careful inspection was made as the colonoscope was withdrawn, including a retroflexed view of the rectum; findings and interventions are described below. Appropriate photodocumentation Was Obtained. Findings:   -Three 5-6 mm sessile polyps located in the descending colon, removed by cold biopsy and sent for pathology  - Moderate sized non bleeding internal hemorrhoids    - PREP: Miralax  - Overall difficulty: minimal in degree  - Abdominal pressure: no  - Change in position: no  - Anesthesia issues: no  - Endocoff/Amplieye use: no    Specimens: Was Obtained: descending colon polyps    Complications:   None; patient tolerated the procedure well. Disposition:   PACU - hemodynamically stable. Estimated Blood loss:  none    Withdrawal Time:  14 minutes    Impression:   -Benign appearing colon polyps, removed as above. ,   -Moderate internal hemorrhoids. ,   -Otherwise normal colonoscopy to the cecum. Recommendations:  -Await pathology. ,   -Repeat colonoscopy in 3 years. ,   -High fiber diet. ,   -Follow up with primary care physician.         Shena El 9/15/20 11:04 AM EDT

## 2020-09-21 PROBLEM — D12.6 TUBULAR ADENOMA OF COLON: Status: ACTIVE | Noted: 2020-09-21

## 2020-10-27 ENCOUNTER — HOSPITAL ENCOUNTER (EMERGENCY)
Age: 45
Discharge: HOME | End: 2020-10-27
Attending: EMERGENCY MEDICINE
Payer: COMMERCIAL

## 2020-10-27 VITALS
OXYGEN SATURATION: 93 % | SYSTOLIC BLOOD PRESSURE: 155 MMHG | HEART RATE: 77 BPM | WEIGHT: 290 LBS | DIASTOLIC BLOOD PRESSURE: 86 MMHG | BODY MASS INDEX: 39.28 KG/M2 | RESPIRATION RATE: 18 BRPM | HEIGHT: 72 IN | TEMPERATURE: 98.6 F

## 2020-10-27 PROCEDURE — 99282 EMERGENCY DEPT VISIT SF MDM: CPT

## 2020-10-28 NOTE — ED PROVIDER NOTES
[facility]      Pt Name: Fei Dejesus  MRN: [id number]  [place] 1975  Date of evaluation: 10/27/2020  Provider: Ken Kahn MD    CHIEF COMPLAINT       Chief Complaint   Patient presents with    Foreign Body     pt c/o insect poss tick in R upper arm         HISTORY OF PRESENT ILLNESS   (Location/Symptom, Timing/Onset, Context/Setting, Quality, Duration, Modifying Factors, Severity)  Note limiting factors. Fei Dejesus is a 54 y.o. male with past medical history of elated illness here today for a tick bite to the right arm    Patient states that just earlier today he noticed a tick that was embedded in his right proximal arm. States he is tried numerous removal methods at home but is been unsuccessful. He states he did pour alcohol over the area and thinks the tick is dead, but he was unable to remove it. He otherwise denies any rash at the site. Denies any arthralgias or myalgias. No headaches. No fevers or chills. He states he has been hunting lately and has been out in the woods and presumes he got the tick from that. Otherwise feels fine. HPI    Nursing Notes were reviewed. REVIEW OF SYSTEMS    (2-9 systems for level 4, 10 or more for level 5)     Review of Systems    Please see HPI for pertinent positive and negative review of system findings. A full 10 system ROS was performed and otherwise negative. PAST MEDICAL HISTORY     Past Medical History:   Diagnosis Date    CKD (chronic kidney disease)     HTN (hypertension)     Polycystic kidney     Dr. Kimo Reddy    PSVT [facility]ox[facility]mal supra[facility]ricular tachycardia) ([facility]     Stage 2 chronic kidney disease 8/12/2017    Tubular adenoma of colon 09/21/2020    Needs 3 year follow up.          SURGICAL HISTORY       Past Surgical History:   Procedure Laterality Date    CARDIAC CATHETERIZATION  2008    COLONOSCOPY  09/15/2020    with biopsy    COLONOSCOPY N/A 9/15/2020 COLONOSCOPY WITH BIOPSY performed by Nisreen Rodríguez MD at [facility]      Dr. Joo ALVARADO flank hematoma removal         CURRENT MEDICATIONS       Previous Medications    METOPROLOL TARTRATE (LOPRESSOR) 25 MG TABLET    TAKE ONE TABLET BY MOUTH TWICE A DAY       ALLERGIES     Ibuprofen and Codeine    FAMILY HISTORY       Family History   Problem Relation Age of Onset    Diabetes Son    Creston Sicard Other Mother          with pericardial effusion    Other Father         AW          SOCIAL HISTORY       Social History     Socioeconomic History    Marital status:      Spouse name: Nelia Lux Number of children: 2    Years of education: Not on file    Highest education level: Not on file   Occupational History    Occupation: Boat Captain   Social Needs    Financial resource strain: Not on file    Food insecurity     Worry: Not on file     Inability: Not on file   [employer] needs     Medical: Not on file     Non-medical: Not on file   Tobacco Use    Smoking status: Current Every Day Smoker     Packs/day: 1.00     Types: Cigarettes    Smokeless tobacco: Never Used    Tobacco comment: contemplating   Substance and Sexual Activity    Alcohol use:  Yes     Alcohol/week: 0.0 standard drinks     Comment: weekend    Drug use: No    Sexual activity: Not Currently   Lifestyle    Physical activity     Days per week: Not on file     Minutes per session: Not on file    Stress: Not on file   Relationships    Social connections     Talks on phone: Not on file     Gets together: Not on file     Attends Holiness service: Not on file     Active member of club or organization: Not on file     Attends meetings of clubs or organizations: Not on file     Relationship status: Not on file    Intimate partner violence     Fear of current or ex partner: Not on file     Emotionally abused: Not on file     Physically abused: Not on file     Forced sexual activity: Not on file   Other Topics Concern    Not on file   Social History Narrative    Not on file       SCREENINGS               PHYSICAL EXAM    (up to 7 for level 4, 8 or more for level 5)     ED Triage Vitals [10/27/20 2115]   BP Temp Temp Source Pulse Resp SpO2 Height Weight   (!) 155/86 98.6 °F (37 °C) Oral 77 18 93 % 6' (1.829 m) 290 lb (131.5 kg)       Physical Exam      General appearance:  Cooperative. No acute distress. Skin:  Warm. Dry. Small 0.5 cm area of ecchymosis and bruising surrounding a 4 mm nonengorged dead tick on the right medial proximal upper extremity. No surrounding targetoid lesion no discharge or drainage  Ears, nose, mouth and throat:  Oral mucosa moist,  Perfusion:  intact  Respiratory: Respirations nonlabored. Neurological:  Alert. Moves all extremities spontaneously  Musculoskeletal:   Normal ROM, no deformities          Psychiatric:  Normal mood      DIAGNOSTIC RESULTS       Labs Reviewed - No data to display    Interpretation per the Radiologist below, if obtained/available at the time of this note:    No orders to display       All other labs/imaging were within normal range or not returned as of this dictation. EMERGENCY DEPARTMENT COURSE and DIFFERENTIAL DIAGNOSIS/MDM:   Vitals:    Vitals:    10/27/20 2115   BP: (!) 155/86   Pulse: 77   Resp: 18   Temp: 98.6 °F (37 °C)   TempSrc: Oral   SpO2: 93%   Weight: 290 lb (131.5 kg)   Height: 6' (1.829 m)       Patient with embedded tick in the right upper extremity. Appears dead. Nonengorged. No erythema migrans. No myalgias, arthralgias or fevers. Tick removed with forceps. The entire tick was successfully removed with no retained portions. Patient tolerated this well. Area cleaned with alcohol.   Discharged home with return instructions should he develop any Lyme's disease symptoms    MDM    CONSULTS     None    Critical Care:   None    REASSESSMENT          PROCEDURE     Unless otherwise noted below, none     Procedures      FINAL

## 2020-12-04 ENCOUNTER — HOSPITAL ENCOUNTER (OUTPATIENT)
Age: 45
Discharge: HOME | End: 2020-12-04
Payer: COMMERCIAL

## 2020-12-04 LAB
ALBUMIN SERPL-MCNC: 4.1 G/DL (ref 3.4–5)
ANION GAP SERPL CALCULATED.3IONS-SCNC: 9 MMOL/L (ref 3–16)
BILIRUBIN URINE: NEGATIVE
BLOOD, URINE: ABNORMAL
BUN BLDV-MCNC: 10 MG/DL (ref 7–20)
CALCIUM SERPL-MCNC: 8.9 MG/DL (ref 8.3–10.6)
CHLORIDE BLD-SCNC: 103 MMOL/L (ref 99–110)
CLARITY: CLEAR
CO2: 23 MMOL/L (ref 21–32)
COLOR: YELLOW
CREAT SERPL-MCNC: 0.7 MG/DL (ref 0.9–1.3)
CREATININE URINE: 116.6 MG/DL (ref 39–259)
GFR AFRICAN AMERICAN: >60
GFR NON-AFRICAN AMERICAN: >60
GLUCOSE BLD-MCNC: 85 MG/DL (ref 70–99)
GLUCOSE URINE: NEGATIVE MG/DL
HCT VFR BLD CALC: 51.2 % (ref 40.5–52.5)
HEMOGLOBIN: 17.6 G/DL (ref 13.5–17.5)
KETONES, URINE: NEGATIVE MG/DL
LEUKOCYTE ESTERASE, URINE: NEGATIVE
MCH RBC QN AUTO: 31.5 PG (ref 26–34)
MCHC RBC AUTO-ENTMCNC: 34.4 G/DL (ref 31–36)
MCV RBC AUTO: 91.8 FL (ref 80–100)
MICROSCOPIC EXAMINATION: YES
NITRITE, URINE: NEGATIVE
PARATHYROID HORMONE INTACT: 39.8 PG/ML (ref 14–72)
PDW BLD-RTO: 13 % (ref 12.4–15.4)
PH UA: 7 (ref 5–8)
PHOSPHORUS: 2.8 MG/DL (ref 2.5–4.9)
PLATELET # BLD: 157 K/UL (ref 135–450)
PMV BLD AUTO: 9.8 FL (ref 5–10.5)
POTASSIUM SERPL-SCNC: 4.2 MMOL/L (ref 3.5–5.1)
PROTEIN PROTEIN: 12 MG/DL
PROTEIN UA: NEGATIVE MG/DL
PROTEIN/CREAT RATIO: 0.1 MG/DL
RBC # BLD: 5.58 M/UL (ref 4.2–5.9)
RBC UA: NORMAL /HPF (ref 0–4)
SODIUM BLD-SCNC: 135 MMOL/L (ref 136–145)
SPECIFIC GRAVITY UA: 1.02 (ref 1–1.03)
URINE TYPE: ABNORMAL
UROBILINOGEN, URINE: 0.2 E.U./DL
VITAMIN D 25-HYDROXY: 28.9 NG/ML
WBC # BLD: 7.2 K/UL (ref 4–11)
WBC UA: NORMAL /HPF (ref 0–5)

## 2020-12-04 PROCEDURE — 82570 ASSAY OF URINE CREATININE: CPT

## 2020-12-04 PROCEDURE — 82306 VITAMIN D 25 HYDROXY: CPT

## 2020-12-04 PROCEDURE — 80069 RENAL FUNCTION PANEL: CPT

## 2020-12-04 PROCEDURE — 83970 ASSAY OF PARATHORMONE: CPT

## 2020-12-04 PROCEDURE — 84156 ASSAY OF PROTEIN URINE: CPT

## 2020-12-04 PROCEDURE — 81001 URINALYSIS AUTO W/SCOPE: CPT

## 2020-12-04 PROCEDURE — 36415 COLL VENOUS BLD VENIPUNCTURE: CPT

## 2020-12-04 PROCEDURE — 85027 COMPLETE CBC AUTOMATED: CPT

## 2021-03-21 ENCOUNTER — APPOINTMENT (OUTPATIENT)
Dept: CT IMAGING | Age: 46
End: 2021-03-21
Payer: COMMERCIAL

## 2021-03-21 ENCOUNTER — APPOINTMENT (OUTPATIENT)
Dept: GENERAL RADIOLOGY | Age: 46
End: 2021-03-21
Payer: COMMERCIAL

## 2021-03-21 ENCOUNTER — HOSPITAL ENCOUNTER (EMERGENCY)
Age: 46
Discharge: ANOTHER HOSPITAL | End: 2021-03-22
Attending: EMERGENCY MEDICINE
Payer: COMMERCIAL

## 2021-03-21 DIAGNOSIS — R07.9 EXERTIONAL CHEST PAIN: Primary | ICD-10-CM

## 2021-03-21 DIAGNOSIS — R42 LIGHTHEADEDNESS: ICD-10-CM

## 2021-03-21 LAB
ANION GAP SERPL CALCULATED.3IONS-SCNC: 8 MMOL/L (ref 3–16)
BACTERIA: ABNORMAL /HPF
BASOPHILS ABSOLUTE: 0.1 K/UL (ref 0–0.2)
BASOPHILS RELATIVE PERCENT: 0.9 %
BILIRUBIN URINE: NEGATIVE
BLOOD, URINE: ABNORMAL
BUN BLDV-MCNC: 11 MG/DL (ref 7–20)
CALCIUM SERPL-MCNC: 8.9 MG/DL (ref 8.3–10.6)
CHLORIDE BLD-SCNC: 104 MMOL/L (ref 99–110)
CLARITY: CLEAR
CO2: 27 MMOL/L (ref 21–32)
COLOR: YELLOW
CREAT SERPL-MCNC: 0.8 MG/DL (ref 0.9–1.3)
D DIMER: 242 NG/ML DDU (ref 0–229)
EOSINOPHILS ABSOLUTE: 0.3 K/UL (ref 0–0.6)
EOSINOPHILS RELATIVE PERCENT: 3 %
GFR AFRICAN AMERICAN: >60
GFR NON-AFRICAN AMERICAN: >60
GLUCOSE BLD-MCNC: 140 MG/DL (ref 70–99)
GLUCOSE URINE: NEGATIVE MG/DL
HCT VFR BLD CALC: 49.3 % (ref 40.5–52.5)
HEMOGLOBIN: 16.9 G/DL (ref 13.5–17.5)
KETONES, URINE: NEGATIVE MG/DL
LEUKOCYTE ESTERASE, URINE: NEGATIVE
LYMPHOCYTES ABSOLUTE: 4.2 K/UL (ref 1–5.1)
LYMPHOCYTES RELATIVE PERCENT: 43.4 %
MCH RBC QN AUTO: 32.1 PG (ref 26–34)
MCHC RBC AUTO-ENTMCNC: 34.3 G/DL (ref 31–36)
MCV RBC AUTO: 93.4 FL (ref 80–100)
MICROSCOPIC EXAMINATION: YES
MONOCYTES ABSOLUTE: 0.9 K/UL (ref 0–1.3)
MONOCYTES RELATIVE PERCENT: 9 %
NEUTROPHILS ABSOLUTE: 4.2 K/UL (ref 1.7–7.7)
NEUTROPHILS RELATIVE PERCENT: 43.7 %
NITRITE, URINE: NEGATIVE
PDW BLD-RTO: 12.9 % (ref 12.4–15.4)
PH UA: 7.5 (ref 5–8)
PLATELET # BLD: 189 K/UL (ref 135–450)
PMV BLD AUTO: 9 FL (ref 5–10.5)
POTASSIUM SERPL-SCNC: 3.9 MMOL/L (ref 3.5–5.1)
PROTEIN UA: NEGATIVE MG/DL
RBC # BLD: 5.27 M/UL (ref 4.2–5.9)
RBC UA: ABNORMAL /HPF (ref 0–4)
SODIUM BLD-SCNC: 139 MMOL/L (ref 136–145)
SPECIFIC GRAVITY UA: 1.02 (ref 1–1.03)
TROPONIN: <0.01 NG/ML
URINE TYPE: ABNORMAL
UROBILINOGEN, URINE: 0.2 E.U./DL
WBC # BLD: 9.7 K/UL (ref 4–11)
WBC UA: ABNORMAL /HPF (ref 0–5)

## 2021-03-21 PROCEDURE — 2580000003 HC RX 258: Performed by: EMERGENCY MEDICINE

## 2021-03-21 PROCEDURE — 93005 ELECTROCARDIOGRAM TRACING: CPT

## 2021-03-21 PROCEDURE — 81001 URINALYSIS AUTO W/SCOPE: CPT

## 2021-03-21 PROCEDURE — 71045 X-RAY EXAM CHEST 1 VIEW: CPT

## 2021-03-21 PROCEDURE — 6360000004 HC RX CONTRAST MEDICATION: Performed by: EMERGENCY MEDICINE

## 2021-03-21 PROCEDURE — 99285 EMERGENCY DEPT VISIT HI MDM: CPT

## 2021-03-21 PROCEDURE — 6370000000 HC RX 637 (ALT 250 FOR IP): Performed by: EMERGENCY MEDICINE

## 2021-03-21 PROCEDURE — 80048 BASIC METABOLIC PNL TOTAL CA: CPT

## 2021-03-21 PROCEDURE — 71260 CT THORAX DX C+: CPT

## 2021-03-21 PROCEDURE — 36415 COLL VENOUS BLD VENIPUNCTURE: CPT

## 2021-03-21 PROCEDURE — 85025 COMPLETE CBC W/AUTO DIFF WBC: CPT

## 2021-03-21 PROCEDURE — 84484 ASSAY OF TROPONIN QUANT: CPT

## 2021-03-21 PROCEDURE — 85379 FIBRIN DEGRADATION QUANT: CPT

## 2021-03-21 RX ORDER — IPRATROPIUM BROMIDE AND ALBUTEROL SULFATE 2.5; .5 MG/3ML; MG/3ML
1 SOLUTION RESPIRATORY (INHALATION) ONCE
Status: COMPLETED | OUTPATIENT
Start: 2021-03-21 | End: 2021-03-21

## 2021-03-21 RX ORDER — 0.9 % SODIUM CHLORIDE 0.9 %
500 INTRAVENOUS SOLUTION INTRAVENOUS ONCE
Status: COMPLETED | OUTPATIENT
Start: 2021-03-21 | End: 2021-03-22

## 2021-03-21 RX ORDER — LOSARTAN POTASSIUM 25 MG/1
25 TABLET ORAL DAILY
Status: ON HOLD | COMMUNITY
End: 2021-03-22 | Stop reason: HOSPADM

## 2021-03-21 RX ADMIN — SODIUM CHLORIDE 500 ML: 9 INJECTION, SOLUTION INTRAVENOUS at 23:04

## 2021-03-21 RX ADMIN — IPRATROPIUM BROMIDE AND ALBUTEROL SULFATE 1 AMPULE: .5; 3 SOLUTION RESPIRATORY (INHALATION) at 23:04

## 2021-03-21 RX ADMIN — IOPAMIDOL 75 ML: 755 INJECTION, SOLUTION INTRAVENOUS at 23:30

## 2021-03-21 ASSESSMENT — PAIN SCALES - GENERAL
PAINLEVEL_OUTOF10: 0

## 2021-03-22 ENCOUNTER — HOSPITAL ENCOUNTER (OUTPATIENT)
Age: 46
Setting detail: OBSERVATION
Discharge: HOME | End: 2021-03-22
Attending: INTERNAL MEDICINE | Admitting: INTERNAL MEDICINE
Payer: COMMERCIAL

## 2021-03-22 VITALS
BODY MASS INDEX: 41.8 KG/M2 | SYSTOLIC BLOOD PRESSURE: 152 MMHG | RESPIRATION RATE: 18 BRPM | HEART RATE: 65 BPM | WEIGHT: 308.64 LBS | TEMPERATURE: 98 F | OXYGEN SATURATION: 99 % | HEIGHT: 72 IN | DIASTOLIC BLOOD PRESSURE: 94 MMHG

## 2021-03-22 VITALS
TEMPERATURE: 98.5 F | HEART RATE: 64 BPM | WEIGHT: 290 LBS | DIASTOLIC BLOOD PRESSURE: 80 MMHG | BODY MASS INDEX: 39.28 KG/M2 | SYSTOLIC BLOOD PRESSURE: 106 MMHG | OXYGEN SATURATION: 95 % | RESPIRATION RATE: 18 BRPM | HEIGHT: 72 IN

## 2021-03-22 DIAGNOSIS — G47.39 SLEEP APNEA-LIKE BEHAVIOR: Primary | ICD-10-CM

## 2021-03-22 PROBLEM — R07.9 CHEST PAIN: Status: ACTIVE | Noted: 2021-03-22

## 2021-03-22 LAB
ALBUMIN SERPL-MCNC: 3.9 G/DL (ref 3.4–5)
ANION GAP SERPL CALCULATED.3IONS-SCNC: 10 MMOL/L (ref 3–16)
BUN BLDV-MCNC: 11 MG/DL (ref 7–20)
CALCIUM SERPL-MCNC: 8.7 MG/DL (ref 8.3–10.6)
CHLORIDE BLD-SCNC: 105 MMOL/L (ref 99–110)
CO2: 23 MMOL/L (ref 21–32)
CREAT SERPL-MCNC: 0.7 MG/DL (ref 0.9–1.3)
EKG ATRIAL RATE: 83 BPM
EKG DIAGNOSIS: NORMAL
EKG P AXIS: 41 DEGREES
EKG P-R INTERVAL: 170 MS
EKG Q-T INTERVAL: 376 MS
EKG QRS DURATION: 100 MS
EKG QTC CALCULATION (BAZETT): 441 MS
EKG R AXIS: -3 DEGREES
EKG T AXIS: 28 DEGREES
EKG VENTRICULAR RATE: 83 BPM
ESTIMATED AVERAGE GLUCOSE: 105.4 MG/DL
GFR AFRICAN AMERICAN: >60
GFR NON-AFRICAN AMERICAN: >60
GLUCOSE BLD-MCNC: 98 MG/DL (ref 70–99)
HBA1C MFR BLD: 5.3 %
LEFT VENTRICULAR EJECTION FRACTION MODE: NORMAL
LV EF: 52 %
LV EF: 55 %
LV EF: 58 %
LVEF MODALITY: NORMAL
LVEF MODALITY: NORMAL
MAGNESIUM: 1.9 MG/DL (ref 1.8–2.4)
PHOSPHORUS: 3.4 MG/DL (ref 2.5–4.9)
POTASSIUM SERPL-SCNC: 4.3 MMOL/L (ref 3.5–5.1)
SODIUM BLD-SCNC: 138 MMOL/L (ref 136–145)
TROPONIN: <0.01 NG/ML
TSH REFLEX: 1.52 UIU/ML (ref 0.27–4.2)

## 2021-03-22 PROCEDURE — 3430000000 HC RX DIAGNOSTIC RADIOPHARMACEUTICAL: Performed by: INTERNAL MEDICINE

## 2021-03-22 PROCEDURE — 99255 IP/OBS CONSLTJ NEW/EST HI 80: CPT | Performed by: INTERNAL MEDICINE

## 2021-03-22 PROCEDURE — 83735 ASSAY OF MAGNESIUM: CPT

## 2021-03-22 PROCEDURE — 84484 ASSAY OF TROPONIN QUANT: CPT

## 2021-03-22 PROCEDURE — 6360000002 HC RX W HCPCS

## 2021-03-22 PROCEDURE — 78452 HT MUSCLE IMAGE SPECT MULT: CPT

## 2021-03-22 PROCEDURE — 93458 L HRT ARTERY/VENTRICLE ANGIO: CPT

## 2021-03-22 PROCEDURE — 6360000004 HC RX CONTRAST MEDICATION: Performed by: STUDENT IN AN ORGANIZED HEALTH CARE EDUCATION/TRAINING PROGRAM

## 2021-03-22 PROCEDURE — 93458 L HRT ARTERY/VENTRICLE ANGIO: CPT | Performed by: INTERNAL MEDICINE

## 2021-03-22 PROCEDURE — G0378 HOSPITAL OBSERVATION PER HR: HCPCS

## 2021-03-22 PROCEDURE — 6360000002 HC RX W HCPCS: Performed by: INTERNAL MEDICINE

## 2021-03-22 PROCEDURE — 6360000004 HC RX CONTRAST MEDICATION: Performed by: INTERNAL MEDICINE

## 2021-03-22 PROCEDURE — 36415 COLL VENOUS BLD VENIPUNCTURE: CPT

## 2021-03-22 PROCEDURE — 6370000000 HC RX 637 (ALT 250 FOR IP): Performed by: STUDENT IN AN ORGANIZED HEALTH CARE EDUCATION/TRAINING PROGRAM

## 2021-03-22 PROCEDURE — 80069 RENAL FUNCTION PANEL: CPT

## 2021-03-22 PROCEDURE — 99152 MOD SED SAME PHYS/QHP 5/>YRS: CPT

## 2021-03-22 PROCEDURE — 6370000000 HC RX 637 (ALT 250 FOR IP)

## 2021-03-22 PROCEDURE — C8929 TTE W OR WO FOL WCON,DOPPLER: HCPCS

## 2021-03-22 PROCEDURE — 2500000003 HC RX 250 WO HCPCS

## 2021-03-22 PROCEDURE — 1200000000 HC SEMI PRIVATE

## 2021-03-22 PROCEDURE — 86618 LYME DISEASE ANTIBODY: CPT

## 2021-03-22 PROCEDURE — 84443 ASSAY THYROID STIM HORMONE: CPT

## 2021-03-22 PROCEDURE — C1894 INTRO/SHEATH, NON-LASER: HCPCS

## 2021-03-22 PROCEDURE — 2580000003 HC RX 258: Performed by: STUDENT IN AN ORGANIZED HEALTH CARE EDUCATION/TRAINING PROGRAM

## 2021-03-22 PROCEDURE — 83036 HEMOGLOBIN GLYCOSYLATED A1C: CPT

## 2021-03-22 PROCEDURE — C1887 CATHETER, GUIDING: HCPCS

## 2021-03-22 PROCEDURE — G0379 DIRECT REFER HOSPITAL OBSERV: HCPCS

## 2021-03-22 PROCEDURE — 93017 CV STRESS TEST TRACING ONLY: CPT

## 2021-03-22 PROCEDURE — 93010 ELECTROCARDIOGRAM REPORT: CPT | Performed by: INTERNAL MEDICINE

## 2021-03-22 PROCEDURE — 2709999900 HC NON-CHARGEABLE SUPPLY

## 2021-03-22 PROCEDURE — 6370000000 HC RX 637 (ALT 250 FOR IP): Performed by: EMERGENCY MEDICINE

## 2021-03-22 PROCEDURE — C1769 GUIDE WIRE: HCPCS

## 2021-03-22 PROCEDURE — A9502 TC99M TETROFOSMIN: HCPCS | Performed by: INTERNAL MEDICINE

## 2021-03-22 RX ORDER — ATORVASTATIN CALCIUM 40 MG/1
40 TABLET, FILM COATED ORAL NIGHTLY
Qty: 30 TABLET | Refills: 3 | OUTPATIENT
Start: 2021-03-22

## 2021-03-22 RX ORDER — METOPROLOL SUCCINATE 25 MG/1
25 TABLET, EXTENDED RELEASE ORAL DAILY
Qty: 90 TABLET | Refills: 1 | Status: SHIPPED | OUTPATIENT
Start: 2021-03-22 | End: 2021-09-24 | Stop reason: SDUPTHER

## 2021-03-22 RX ORDER — ACETAMINOPHEN 325 MG/1
650 TABLET ORAL EVERY 4 HOURS PRN
Status: DISCONTINUED | OUTPATIENT
Start: 2021-03-22 | End: 2021-03-22 | Stop reason: HOSPADM

## 2021-03-22 RX ORDER — SODIUM CHLORIDE 9 MG/ML
INJECTION, SOLUTION INTRAVENOUS CONTINUOUS
Status: DISCONTINUED | OUTPATIENT
Start: 2021-03-22 | End: 2021-03-22 | Stop reason: HOSPADM

## 2021-03-22 RX ORDER — SODIUM CHLORIDE 0.9 % (FLUSH) 0.9 %
10 SYRINGE (ML) INJECTION PRN
Status: DISCONTINUED | OUTPATIENT
Start: 2021-03-22 | End: 2021-03-22 | Stop reason: HOSPADM

## 2021-03-22 RX ORDER — FAMOTIDINE 20 MG/1
20 TABLET, FILM COATED ORAL 2 TIMES DAILY
Status: DISCONTINUED | OUTPATIENT
Start: 2021-03-22 | End: 2021-03-22 | Stop reason: HOSPADM

## 2021-03-22 RX ORDER — ATORVASTATIN CALCIUM 40 MG/1
40 TABLET, FILM COATED ORAL NIGHTLY
Qty: 30 TABLET | Refills: 3 | Status: SHIPPED | OUTPATIENT
Start: 2021-03-22 | End: 2021-09-24 | Stop reason: SDUPTHER

## 2021-03-22 RX ORDER — DOBUTAMINE HYDROCHLORIDE 200 MG/100ML
10 INJECTION INTRAVENOUS CONTINUOUS
Status: DISCONTINUED | OUTPATIENT
Start: 2021-03-22 | End: 2021-03-22

## 2021-03-22 RX ORDER — ACETAMINOPHEN 325 MG/1
650 TABLET ORAL EVERY 6 HOURS PRN
Status: DISCONTINUED | OUTPATIENT
Start: 2021-03-22 | End: 2021-03-22 | Stop reason: HOSPADM

## 2021-03-22 RX ORDER — LOSARTAN POTASSIUM 25 MG/1
25 TABLET ORAL DAILY
Status: DISCONTINUED | OUTPATIENT
Start: 2021-03-22 | End: 2021-03-22

## 2021-03-22 RX ORDER — POLYETHYLENE GLYCOL 3350 17 G/17G
17 POWDER, FOR SOLUTION ORAL DAILY PRN
Status: DISCONTINUED | OUTPATIENT
Start: 2021-03-22 | End: 2021-03-22 | Stop reason: HOSPADM

## 2021-03-22 RX ORDER — PROMETHAZINE HYDROCHLORIDE 12.5 MG/1
12.5 TABLET ORAL EVERY 6 HOURS PRN
Status: DISCONTINUED | OUTPATIENT
Start: 2021-03-22 | End: 2021-03-22 | Stop reason: HOSPADM

## 2021-03-22 RX ORDER — METOPROLOL SUCCINATE 25 MG/1
25 TABLET, EXTENDED RELEASE ORAL DAILY
Qty: 30 TABLET | Refills: 3 | OUTPATIENT
Start: 2021-03-22

## 2021-03-22 RX ORDER — ONDANSETRON 2 MG/ML
4 INJECTION INTRAMUSCULAR; INTRAVENOUS EVERY 6 HOURS PRN
Status: DISCONTINUED | OUTPATIENT
Start: 2021-03-22 | End: 2021-03-22 | Stop reason: HOSPADM

## 2021-03-22 RX ORDER — SODIUM CHLORIDE 0.9 % (FLUSH) 0.9 %
10 SYRINGE (ML) INJECTION EVERY 12 HOURS SCHEDULED
Status: DISCONTINUED | OUTPATIENT
Start: 2021-03-22 | End: 2021-03-22 | Stop reason: HOSPADM

## 2021-03-22 RX ORDER — ASPIRIN 81 MG/1
81 TABLET, CHEWABLE ORAL DAILY
Status: DISCONTINUED | OUTPATIENT
Start: 2021-03-22 | End: 2021-03-22 | Stop reason: HOSPADM

## 2021-03-22 RX ORDER — ASPIRIN 81 MG/1
81 TABLET, CHEWABLE ORAL DAILY
Qty: 30 TABLET | Refills: 3 | OUTPATIENT
Start: 2021-03-23

## 2021-03-22 RX ORDER — ATORVASTATIN CALCIUM 40 MG/1
40 TABLET, FILM COATED ORAL NIGHTLY
Status: DISCONTINUED | OUTPATIENT
Start: 2021-03-22 | End: 2021-03-22 | Stop reason: HOSPADM

## 2021-03-22 RX ORDER — ACETAMINOPHEN 650 MG/1
650 SUPPOSITORY RECTAL EVERY 6 HOURS PRN
Status: DISCONTINUED | OUTPATIENT
Start: 2021-03-22 | End: 2021-03-22 | Stop reason: HOSPADM

## 2021-03-22 RX ORDER — ASPIRIN 325 MG
162 TABLET ORAL ONCE
Status: COMPLETED | OUTPATIENT
Start: 2021-03-22 | End: 2021-03-22

## 2021-03-22 RX ADMIN — PERFLUTREN 1.65 MG: 6.52 INJECTION, SUSPENSION INTRAVENOUS at 14:06

## 2021-03-22 RX ADMIN — TETROFOSMIN 10 MILLICURIE: 1.38 INJECTION, POWDER, LYOPHILIZED, FOR SOLUTION INTRAVENOUS at 09:55

## 2021-03-22 RX ADMIN — Medication 10 ML: at 09:55

## 2021-03-22 RX ADMIN — ASPIRIN 81 MG: 81 TABLET, CHEWABLE ORAL at 13:11

## 2021-03-22 RX ADMIN — TETROFOSMIN 30 MILLICURIE: 1.38 INJECTION, POWDER, LYOPHILIZED, FOR SOLUTION INTRAVENOUS at 10:55

## 2021-03-22 RX ADMIN — ASPIRIN 162 MG: 325 TABLET, FILM COATED ORAL at 00:51

## 2021-03-22 RX ADMIN — REGADENOSON 0.4 MG: 0.08 INJECTION, SOLUTION INTRAVENOUS at 10:56

## 2021-03-22 RX ADMIN — IOHEXOL 150 ML: 350 INJECTION, SOLUTION INTRAVENOUS at 15:57

## 2021-03-22 RX ADMIN — ACETAMINOPHEN 650 MG: 325 TABLET ORAL at 13:12

## 2021-03-22 RX ADMIN — Medication 10 ML: at 13:12

## 2021-03-22 RX ADMIN — Medication 10 ML: at 10:55

## 2021-03-22 ASSESSMENT — PAIN SCALES - GENERAL
PAINLEVEL_OUTOF10: 0

## 2021-03-22 ASSESSMENT — PAIN DESCRIPTION - FREQUENCY: FREQUENCY: INTERMITTENT

## 2021-03-22 ASSESSMENT — PAIN DESCRIPTION - PROGRESSION: CLINICAL_PROGRESSION: NOT CHANGED

## 2021-03-22 ASSESSMENT — PAIN - FUNCTIONAL ASSESSMENT: PAIN_FUNCTIONAL_ASSESSMENT: ACTIVITIES ARE NOT PREVENTED

## 2021-03-22 NOTE — ANESTHESIA PRE-OP
2001    Dr. Ai ALVARADO flank hematoma removal         Medications:  Current Facility-Administered Medications   Medication Dose Route Frequency Provider Last Rate Last Admin    sodium chloride flush 0.9 % injection 10 mL  10 mL Intravenous 2 times per day Mega Joana, DO   10 mL at 03/22/21 1312    sodium chloride flush 0.9 % injection 10 mL  10 mL Intravenous PRN Mega Joana, DO   10 mL at 03/22/21 1055    enoxaparin (LOVENOX) injection 40 mg  40 mg Subcutaneous Daily Mega Joana, DO        promethazine (PHENERGAN) tablet 12.5 mg  12.5 mg Oral Q6H PRN Mega Joana, DO        Or    ondansetron (ZOFRAN) injection 4 mg  4 mg Intravenous Q6H PRN Mega Joana, DO        polyethylene glycol (GLYCOLAX) packet 17 g  17 g Oral Daily PRN Mega Joana, DO        acetaminophen (TYLENOL) tablet 650 mg  650 mg Oral Q6H PRN Mega Joana, DO   650 mg at 03/22/21 1312    Or    acetaminophen (TYLENOL) suppository 650 mg  650 mg Rectal Q6H PRN Mega Joana, DO        famotidine (PEPCID) tablet 20 mg  20 mg Oral BID Mega Joana, DO        aspirin chewable tablet 81 mg  81 mg Oral Daily Michael Comer, DO   81 mg at 03/22/21 1311    atorvastatin (LIPITOR) tablet 40 mg  40 mg Oral Nightly Mega Joana, DO               Pre-Sedation:    Pre-Sedation Documentation and Exam:  I have personally completed a history, physical exam & review of systems for this patient (see notes). Prior History of Anesthesia Complications:   none    Modified Mallampati:  I (soft palate, uvula, fauces, tonsillar pillars visible)    ASA Classification:  Class 2 - A normal healthy patient with mild systemic disease and Class 2 -- A normal healthy patient with mild systemic disease    Ruben Scale:   Activity:  2 - Able to move 4 extremities voluntarily on command  Respiration:  2 - Able to breathe deeply and cough freely  Circulation:  2 - BP+/- 20mmHg of normal  Consciousness:  2 - Fully awake  Oxygen Saturation (color):  2 - Able to maintain oxygen saturation >92% on room air    Sedation/Anesthesia Plan:  Guard the patient's safety and welfare. Minimize physical discomfort and pain. Minimize negative psychological responses to treatment by providing sedation and analgesia and maximize the potential amnesia. Patient to meet pre-procedure discharge plan.     Medication Planned:  Versed and fentanyl    Patient is an appropriate candidate for plan of sedation: yes    Electronically signed by Mary Norris MD on 3/22/2021 at 4:32 PM

## 2021-03-22 NOTE — CARE COORDINATION
Case Management Assessment           Initial Evaluation                Date / Time of Evaluation: 3/22/2021 12:42 PM                 Assessment Completed by: Partha Nowak    Patient Name: Chang Bobby     YOB: 1975  Diagnosis: Chest pain, cardiac [R07.9]  Chest pain [R07.9]     Date / Time: 3/22/2021  4:09 AM    Patient Admission Status: Observation    If patient is discharged prior to next notation, then this note serves as note for discharge by case management. Current PCP: Chelsea Weir MD  Clinic Patient: No    Chart Reviewed: Yes  Patient/ Family Interviewed: Yes    Initial assessment completed at bedside with: patient    Hospitalization in the last 30 days: No    Emergency Contacts:  Extended Emergency Contact Information  Primary Emergency Contact: [facility]  Address: [address] Ruma Brooks [address] Phone: [phone]  Mobile Phone: [phone]  Relation: Spouse    Advance Directives:   Code Status: Full [address]: No    Financial  Payor: Richard Garcia / Plan: Richard Garcia / Product Type: *No Product type* /     Pre-cert required for SNF: Yes    Pharmacy    [facility] [address]  [address]  Phone: [phone] Fax: [phone]    [address] [facility] Reuben Victor, [address] [phone] Betty Hurtado [phone]  [address] [place] [address]  Phone: [phone] Fax: [phone]      Potential assistance Purchasing Medications:    Does Patient want to participate in local refill/ meds to beds program?:      Meds To Beds General Rules:  1. Can ONLY be done Monday- Friday between 8:30am-5pm  2. Prescription(s) must be in pharmacy by 3pm to be filled same day  3. Copy of patient's insurance/ prescription drug card and patient face sheet must be sent along with the prescription(s)  4.  Cost of Rx cannot be added to hospital bill. If financial assistance is needed, please contact unit  or ;  or  CANNOT provide pharmacy voucher for patients co-pays  5. Patients can then  the prescription on their way out of the hospital at discharge, or pharmacy can deliver to the bedside if staff is available. (payment due at time of pick-up or delivery - cash, check, or card accepted)     Able to afford home medications/ co-pay costs: Yes    ADLS  Support Systems:      PT AM-PAC:   /24  OT AM-PAC:   /24    [place]: home with wife  Steps:      Plans to RETURN to current housing: Yes  Barriers to RETURNING to current housing: medical stability    Home Care Information  Currently ACTIVE with [address]: No  Home Care Agency: Not Applicable    Currently ACTIVE with [facility]: No  Passport/ Waiver: No  Passport/ Waiver Services: Not Applicable          Durable Medical Equipment  DME Provider:    Equipment: none    Home Oxygen and [address] [place] prior to admission: No  [address]: Not Applicable  Other Respiratory Equipment:          Dialysis  Active with HD/PD prior to admission: No  Nephrologist:      HD Center:  Not Applicable    DISCHARGE PLAN:  Disposition: Home- No Services Needed    Transportation PLAN for discharge: family     Factors facilitating achievement of predicted outcomes: Family support, Motivated, Cooperative and Pleasant    Barriers to discharge: test results    Additional Case Management Notes:  CM met with pt. Plans to return home. Does not anticipate any needs for discharge.      The Plan for Transition of Care is related to the following treatment goals of Chest pain, cardiac [R07.9]  Chest pain [R07.9]    The Patient and/or patient representative Lois Crane and his family were provided with a choice of provider and agrees with the discharge plan Not Indicated    Freedom of choice list was provided with basic dialogue that supports the patient's individualized plan of care/goals and shares the quality data associated with the providers.  Not Indicated    Care Transition patient: No    Magui Lamb RN  [facility]  Case Management Department  Ph: [phone]

## 2021-03-22 NOTE — ED NOTES
Pt. Hali Roman on cardiac monitor at this time and given aspirin.      Joseline Gonzalez RN  03/22/21 1381

## 2021-03-22 NOTE — PROGRESS NOTES
Resident Progress Note      Admit Date: 3/22/2021    PCP: Fransisco Soulier, MD                  : 1975  MRN: [id number]    CC: Exertional SoB and Chest discomfort    Subjective:   Seen and examined at bedside. No acute events overnight; patient feels that his shortness of breath has improved. Denies any current chest pain. Patient denies having symptoms during his lexiscan. Denies fevers, chills, abdominal pain, N/V, diarrhea, or dysuria. Data:   Scheduled Meds:   sodium chloride flush  10 mL Intravenous 2 times per day    enoxaparin  40 mg Subcutaneous Daily    famotidine  20 mg Oral BID    aspirin  81 mg Oral Daily    atorvastatin  40 mg Oral Nightly     Continuous Infusions:  PRN Meds:sodium chloride flush, promethazine **OR** ondansetron, polyethylene glycol, acetaminophen **OR** acetaminophen  I/O last 3 completed shifts: In: 61 [P.O.:60]  Out: 500 [Urine:500]  No intake/output data recorded.     Intake/Output Summary (Last 24 hours) at 3/22/2021 1606  Last data filed at 3/22/2021 1325  Gross per 24 hour   Intake 60 ml   Output 500 ml   Net -440 ml       Objective:     Vitals: /84   Pulse 62   Temp 97.3 °F (36.3 °C) (Oral)   Resp 18   Ht 6' (1.829 m)   Wt (!) 308 lb 10.3 oz (140 kg)   SpO2 96%   BMI 41.86 kg/m²     Physical Exam  General appearance: Obese male, resting comfortably, no apparent distress  HEENT EOMI, PERRL, no LAD, no oropharyngeal erythema, MMM  Lungs: CTAB, no wheezes, rhonchi or rales  Heart: RRR +S1/S2 without murmurs, rubs or gallops  Abdomen: Soft, NTND, without rigidity or guarding, normal active bowel sounds  Extremities: Trace edema, good peripheral pulses  Skin: No skin rashes  Neurologic: AAOx4, CNs II-XII grossly intact, moving all extremities spontaneously     LABS:    CBC:   Recent Labs     21  2134   WBC 9.7   HGB 16.9   HCT 49.3   MCV 93.4                                                                   BMP:    Recent Labs reversible defect in the anterior wall. Echo results as noted above.  - Dr. Daniels Matters on cardiac cath today  - Follow-up on Lyme antibodies. Low suspicion as this would be outside the normal timeline for lyme carditis and even with the tick exposure, in [place] Lyme disease [place] be uncommon.   - Patient w/ positive orthostatics in ED  - Aspirin 81mg daily    Hx of Aflutter s/p ablation 2017  - NSR on Telemetry     HLD  - Lipid panel in August 2020 shows LDL of 118, HDL of 35.   - Start atorvastatin 40mg nightly.     HTN  - BP fairly well controlled 120s-130s off his home meds;  - Holding home antihypertensives  - Orthostatic positive; Polycystic Kidney Disease  - Diagnosed 2017  - Per patient he is not aware of anyone else in his family with this;  - Creatinine 0.7  - Patient has tolerated contrast in the past;    Code Status: Full Code  FEN: Diet NPO Effective Now  PPx:  Lovenox  Dispo:  If cath is negative, will observe for 2-3 hours before discharge    Will discuss patient with attending, Annis Muckle, MD Mimi Gaucher MD  PGY-1 Internal Medicine  3/22/2021  4:06 PM

## 2021-03-22 NOTE — PROGRESS NOTES
Discharge order received. Patient informed of discharge order. Discharge instructions reviewed with patient. Copy of discharge instructions given to patient. Signed prescriptions sent electronically to pt's pharmacy given to patient. Patient and wife verbalized understanding, denies needs or questions at this time. IV and telemetry removed. All patient belongings packed and sent with patient upon discharge. Patient left and was transported home by wife.

## 2021-03-22 NOTE — H&P
Internal Medicine PGY-1 Resident History & Physical      PCP: Laurent Lyon MD    Date of Admission: 3/22/2021    Date of Service: Pt seen/examined on 3/22/2021     Chief Complaint: Dyspnea on exertion    Of Present Illness:      Patient is a 54 y.o. male  with PMHx of HLD, HTN, SVT and atrial flutter s/p ablation who presented to [facility]. from [place]. [place] for dyspnea on exertion. States that he has been feeling short of breath with exertion since last Saturday. While he was out for wheeling with his friends anytime he did any kind of physical activity he started feeling light headed short of breath. Then then next Tuesday patient was at [facility] while he was walking down the [place] he started feeling short of breath, and felt like he was about to syncopized. Patient states he has been feeling lightheaded when seated and going to stand. At this time patient denies fever, chills, chest pain, nausea, vomiting, lightheadedness, double vision, blurred vision, abdominal pain, shortness of breath; this is mainly when patient is at rest and lying in bed. Of note, patient had been to the hospital back in October, 2020 with a tick found on his arm that was engorged, and a few weeks after removal patient was complaining of arthralgia and fatigue. In the ED, patient presented with lightheadedness, chest pressure on exertion. EKG showed questionable septal infarct in the past but no acute ST changes, troponin was negative x2 CXR showed cardiomegaly, CTA chest was negative for PE, however poor quality, D-dimer was 242. Presented with a heart score of 6. Past Medical History:          Diagnosis Date    CKD (chronic kidney disease)     HTN (hypertension)     Polycystic kidney     Dr. Yesi Singh    [facility]     Stage 2 chronic kidney disease 8/12/2017    Tubular adenoma of colon 09/21/2020    Needs 3 year follow up.        Past Surgical History:          Procedure Laterality Date    CARDIAC CATHETERIZATION      COLONOSCOPY  09/15/2020    with biopsy    COLONOSCOPY N/A 9/15/2020    COLONOSCOPY WITH BIOPSY performed by Leonela Singh MD at [facility]      Dr. Chris ALVARADO flank hematoma removal       Medications Prior to Admission:      Prior to Admission medications    Medication Sig Start Date End Date Taking? Authorizing Provider   losartan (COZAAR) 25 MG tablet Take 25 mg by mouth daily   Yes Historical Provider, MD   metoprolol tartrate (LOPRESSOR) 25 MG tablet TAKE ONE TABLET BY MOUTH TWICE A DAY 20  Yes Mickie Georges MD       Allergies: Ibuprofen and Codeine    Social History:        TOBACCO: reports that he has been smoking cigarettes. He has been smoking about 1.00 pack per day. He has never used smokeless tobacco.  ETOH:   reports current alcohol use. DRUG:       Family History:      Reviewed in detail and negative for DM, CAD, Cancer, CVA. Positive as follows:        Problem Relation Age of Onset   Shanita Lord Diabetes Son    Shanita Lord Other Mother          with pericardial effusion    Other Father         AW       OF SYSTEMS:   Pertinent positives as noted in the HPI. All other systems reviewed and negative. ROS: Review of Systems    PHYSICAL EXAM PERFORMED:    /85   Pulse 67   Temp 97.4 °F (36.3 °C) (Oral)   Resp 18   Ht 6' (1.829 m)   Wt (!) 308 lb 10.3 oz (140 kg)   SpO2 97%   BMI 41.86 kg/m²     Physical Exam  Constitutional:       General: He is not in acute distress. Appearance: Normal appearance. He is not ill-appearing. HENT:      Head: Normocephalic and atraumatic. Mouth/Throat:      Mouth: Mucous membranes are moist.      Pharynx: Oropharynx is clear. Eyes:      Extraocular Movements: Extraocular movements intact. Cardiovascular:      Rate and Rhythm: Normal rate and regular rhythm. Heart sounds: Normal heart sounds. No murmur. No friction rub. No gallop.        Comments: Soft heart sounds  Pulmonary:      Breath sounds: No wheezing, rhonchi or rales. Abdominal:      Palpations: Abdomen is soft. Tenderness: There is no abdominal tenderness. Musculoskeletal:      Right lower leg: No edema. Left lower leg: No edema. Skin:     General: Skin is warm and dry. Neurological:      General: No focal deficit present. Mental Status: He is alert and oriented to person, place, and time. Mental status is at baseline. Psychiatric:         Mood and Affect: Mood normal.         Thought Content: Thought content normal.           Labs:     Recent Labs     03/21/21 2134   WBC 9.7   HGB 16.9   HCT 49.3        Recent Labs     03/21/21 2134      K 3.9      CO2 27   BUN 11   CREATININE 0.8*   CALCIUM 8.9     No results for input(s): AST, ALT, BILIDIR, BILITOT, ALKPHOS in the last 72 hours. No results for input(s): INR in the last 72 hours. Recent Labs     03/21/21 2134 03/22/21  0138   TROPONINI <0.01 <0.01       Urinalysis:   Lab Results   Component Value Date    NITRU Negative 03/21/2021    WBCUA 0-2 03/21/2021    BACTERIA Rare 03/21/2021    RBCUA 5-10 03/21/2021    BLOODU TRACE-INTACT 03/21/2021    SPECGRAV 1.020 03/21/2021    GLUCOSEU Negative 03/21/2021       Radiology:     NM Cardiac Stress Test Nuclear Imaging    (Results Pending)       ASSESSMENT & PLAN:  Active Hospital Problems    Diagnosis Date Noted    Chest pain [R07.9] 03/22/2021     Chest pain, ACS rule out  Patient presented with CARDENAS, lightheadedness when going from sitting to standing, and chest pain. EKG shows questionable old septal infarct, troponins negative x2. Heart score of 6, JAIDEN score of 1. Lipid panel in August 2020 shows an LDL of 118, HDL of 35. No known recent A1c. She had ablation for a flutter back in 2017, complaining of chest pain, presyncope. Leila Holland in a.m.  -Follow-up on Lyme antibodies.   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  -On telemetry.  -Follow-up A1c  -Check orthostatics  -Start aspirin    HLD  Lipid panel in August 2020 shows an LDL of 118, HDL of 35.   -Start atorvastatin 40mg nightly.     HTN  Patient complains of chest pain, lightheadedness, dizziness specially on exertion  -Holding home antihypertensives  -Follow-up on orthostatics    DVT Prophylaxis: Lovenox  Diet: Diet NPO Effective Now  Diet NPO, After Midnight  Code Status: Full Code    Dispo -GMF    I will discuss the patient with the senior resident and Farshad Murcia MD    Larsen DO Rekha  Internal Medicine Resident, PGY-1  Reach me via [facility]

## 2021-03-22 NOTE — ED PROVIDER NOTES
[facility] EMERGENCY DEPARTMENT      CHIEF COMPLAINT  Dizziness (off and on for a week. per pt mostly when he exhurts himself. COVID test 3/17/21 was neg)       HISTORY OF PRESENT ILLNESS  Ray Paiz is a 54 y.o. male  who presents to the ED complaining of lightheadedness. The patient states that he has been experiencing the symptoms for the past week. He states that he has dyspnea on exertion, as well as some chest pressure and feels like his heart is beating heavy, but not necessarily palpitations. He has had SVT and atrial flutter in the past with ablation, states this does not feel the same. He describes some chest pressure with exertion, but denies any actual chest pain. He states that he was at [address] [place] the other day, and felt so lightheaded when pushing a shopping cart that he almost passed out. He states he also feels very lightheaded when going from seated to standing. He denies syncope. He denies room spinning or vertigo sensation. He states he has a dry cough, not much sputum production. He denies any abdominal pain, vomiting, diarrhea, or headache. He had a negative Covid test a few days ago. He has not had a recent cardiac work-up for coronary artery disease. He is a smoker. Denies history of VTE, recent hospitalizations, recent surgeries, recent travel, leg edema, hemoptysis, estrogen supplementation, or malignancy. No other complaints, modifying factors or associated symptoms. I have reviewed the following from the nursing documentation. Past Medical History:   Diagnosis Date    CKD (chronic kidney disease)     HTN (hypertension)     Polycystic kidney     Dr. Rey Quiroz    PSVT (paroxysmal supraventricular tachycardia) ([facility] [address]     Stage 2 chronic kidney disease 8/12/2017    Tubular adenoma of colon 09/21/2020    Needs 3 year follow up.      Past Surgical History:   Procedure Laterality Date    CARDIAC CATHETERIZATION  2008    COLONOSCOPY  09/15/2020    with biopsy    COLONOSCOPY N/A 9/15/2020    COLONOSCOPY WITH BIOPSY performed by Mendy Chacko [facility] at [facility]      Dr. Perdomo Jamey - R flank hematoma removal     Family History   Problem Relation Age of Onset    Diabetes Son    [facility] Other Mother          with pericardial effusion    Other Father         AW     Social History     Socioeconomic History    Marital status:      Spouse name: Isaak Zaidi Number of children: 2    Years of education: Not on file    Highest education level: Not on file   Occupational History    Occupation:    Social Needs    Financial resource strain: Not on file    Food insecurity     Worry: Not on file     Inability: Not on file   [employer] needs     Medical: Not on file     Non-medical: Not on file   Tobacco Use    Smoking status: Current Every Day Smoker     Packs/day: 1.00     Types: Cigarettes    Smokeless tobacco: Never Used    Tobacco comment: contemplating   Substance and Sexual Activity    Alcohol use: Yes     Alcohol/week: 0.0 standard drinks     Comment: weekend    Drug use: No    Sexual activity: Not Currently   Lifestyle    Physical activity     Days per week: Not on file     Minutes per session: Not on file    Stress: Not on file   Relationships    Social connections     Talks on phone: Not on file     Gets together: Not on file     Attends Restorationism service: Not on file     Active member of club or organization: Not on file     Attends meetings of clubs or organizations: Not on file     Relationship status: Not on file    Intimate partner violence     Fear of current or ex partner: Not on file     Emotionally abused: Not on file     Physically abused: Not on file     Forced sexual activity: Not on file   Other Topics Concern    Not on file   Social History Narrative    Not on file     No current facility-administered medications for this encounter. No current outpatient medications on file.      Allergies Allergen Reactions    Ibuprofen      kidneys    Codeine Rash       REVIEW OF SYSTEMS  10 systems reviewed, pertinent positives per HPI otherwise noted to be negative. PHYSICAL EXAM  /80   Pulse 64   Temp 98.5 °F (36.9 °C) (Oral)   Resp 18   Ht 6' (1.829 m)   Wt 290 lb (131.5 kg)   SpO2 95%   BMI 39.33 kg/m²    Physical exam:  General appearance: awake and cooperative. no distress. Non toxic appearing. Skin: Warm and dry. No rashes or lesions. HENT: Normocephalic. Atraumatic. Mucus membranes are moist.   Neck: supple  Eyes: DIANNE. EOM intact. Heart: RRR. No murmurs. Lungs: Respirations unlabored. Expiratory wheezes; no rales or rhonchi. Good air exchange  Abdomen: No tenderness. Soft. Non distended. No peritoneal signs. Musculoskeletal: No extremity edema. Compartments soft. No deformity. No tenderness in the extremities. All extremities neurovascularly intact. Radial, Dp, and PT pulses +2/4 bilaterally  Neurological: Alert and oriented. No focal deficits. No aphasia or dysarthria. Psychiatric: Normal mood and affect. LABS  I have reviewed all labs for this visit.    Results for orders placed or performed during the hospital encounter of 03/21/21   Urinalysis, reflex to microscopic   Result Value Ref Range    Color, UA Yellow Straw/Yellow    Clarity, UA Clear Clear    Glucose, Ur Negative Negative mg/dL    Bilirubin Urine Negative Negative    Ketones, Urine Negative Negative mg/dL    Specific Gravity, UA 1.020 1.005 - 1.030    Blood, Urine TRACE-INTACT (A) Negative    pH, UA 7.5 5.0 - 8.0    Protein, UA Negative Negative mg/dL    Urobilinogen, Urine 0.2 <2.0 E.U./dL    Nitrite, Urine Negative Negative    Leukocyte Esterase, Urine Negative Negative    Microscopic Examination YES     Urine Type NotGiven    CBC Auto Differential   Result Value Ref Range    WBC 9.7 4.0 - 11.0 K/uL    RBC 5.27 4.20 - 5.90 M/uL    Hemoglobin 16.9 13.5 - 17.5 g/dL    Hematocrit 49.3 40.5 - 52.5 %    MCV 93.4 80.0 - 100.0 fL    MCH 32.1 26.0 - 34.0 pg    MCHC 34.3 31.0 - 36.0 g/dL    RDW 12.9 12.4 - 15.4 %    Platelets 452 591 - 715 K/uL    MPV 9.0 5.0 - 10.5 fL    Neutrophils % 43.7 %    Lymphocytes % 43.4 %    Monocytes % 9.0 %    Eosinophils % 3.0 %    Basophils % 0.9 %    Neutrophils Absolute 4.2 1.7 - 7.7 K/uL    Lymphocytes Absolute 4.2 1.0 - 5.1 K/uL    Monocytes Absolute 0.9 0.0 - 1.3 K/uL    Eosinophils Absolute 0.3 0.0 - 0.6 K/uL    Basophils Absolute 0.1 0.0 - 0.2 K/uL   Basic Metabolic Panel   Result Value Ref Range    Sodium 139 136 - 145 mmol/L    Potassium 3.9 3.5 - 5.1 mmol/L    Chloride 104 99 - 110 mmol/L    CO2 27 21 - 32 mmol/L    Anion Gap 8 3 - 16    Glucose 140 (H) 70 - 99 mg/dL    BUN 11 7 - 20 mg/dL    CREATININE 0.8 (L) 0.9 - 1.3 mg/dL    GFR Non-African American >60 >60    GFR African American >60 >60    Calcium 8.9 8.3 - 10.6 mg/dL   Troponin   Result Value Ref Range    Troponin <0.01 <0.01 ng/mL   D-dimer, quantitative   Result Value Ref Range    D-Dimer, Quant 242 (H) 0 - 229 ng/mL DDU   Microscopic Urinalysis   Result Value Ref Range    WBC, UA 0-2 0 - 5 /HPF    RBC, UA 5-10 (A) 0 - 4 /HPF    Bacteria, UA Rare (A) None Seen /HPF   Troponin   Result Value Ref Range    Troponin <0.01 <0.01 ng/mL   EKG 12 Lead   Result Value Ref Range    Ventricular Rate 83 BPM    Atrial Rate 83 BPM    P-R Interval 170 ms    QRS Duration 100 ms    Q-T Interval 376 ms    QTc Calculation (Bazett) 441 ms    P Axis 41 degrees    R Axis -3 degrees    T Axis 28 degrees    Diagnosis       Normal sinus rhythmSeptal infarct , age undeterminedAbnormal ECGWhen compared with ECG of 19-OCT-2017 07:08,Septal infarct is now PresentNonspecific T wave abnormality has replaced inverted T waves in Inferior leads       ECG  The Ekg interpreted by me shows  normal sinus rhythm with a rate of 83  Axis is   Normal  QTc is  within an acceptable range  Intervals and Durations are unremarkable.       ST Segments: nonspecific changes  No significant change from prior EKG dated 6/14/18    RADIOLOGY  Xr Chest Portable    Result Date: 3/21/2021  EXAMINATION: ONE XRAY VIEW OF THE CHEST 3/21/2021 10:39 pm COMPARISON: April 19, 2017 HISTORY: ORDERING SYSTEM PROVIDED HISTORY: short of breath TECHNOLOGIST PROVIDED HISTORY: Reason for exam:->short of breath Reason for Exam: sob Acuity: Acute Type of Exam: Initial FINDINGS: Mild cardiomegaly, exacerbated by technique. Lungs and costophrenic sulci are clear. No pneumothorax or subdiaphragmatic free air. No acute osseous abnormality identified. No radiographic evidence of acute cardiopulmonary disease. Mild cardiomegaly. Ct Chest Pulmonary Embolism W Contrast    Result Date: 3/22/2021  EXAMINATION: CTA OF THE CHEST 3/21/2021 11:24 pm TECHNIQUE: CTA of the chest was performed after the administration of intravenous contrast.  Multiplanar reformatted images are provided for review. MIP images are provided for review. Dose modulation, iterative reconstruction, and/or weight based adjustment of the mA/kV was utilized to reduce the radiation dose to as low as reasonably achievable. COMPARISON: None. HISTORY: ORDERING SYSTEM PROVIDED HISTORY: dyspnea on exertion TECHNOLOGIST PROVIDED HISTORY: Reason for exam:->dyspnea on exertion Decision Support Exception->Emergency Medical Condition (MA) Reason for Exam: dizzy Acuity: Acute Type of Exam: Initial FINDINGS: Pulmonary Arteries: Pulmonary arteries are not optimally opacified for evaluation. No evidence of intraluminal filling within the main pulmonary artery and its segmental branches to suggest pulmonary embolism. Subsegmental pulmonary artery cannot be excluded. Thickened bronchovascular bundle is noted next to the the right  upper lobe pulmonary artery, mimicking filling defect. There is a linear apparent defect within the left main pulmonary artery which is artifactual.  Main pulmonary artery is normal in caliber.  Mediastinum: No evidence of mediastinal lymphadenopathy. The heart and pericardium demonstrate no acute abnormality. There is no acute abnormality of the thoracic aorta. Lungs/pleura: The lungs are without acute process. No focal consolidation or pulmonary edema. No evidence of pleural effusion or pneumothorax. Upper Abdomen: Limited images of the upper abdomen are unremarkable. Soft Tissues/Bones: No acute bone or soft tissue abnormality. Pulmonary arteries are not optimally opacified for evaluation. No evidence of intraluminal filling within the main pulmonary artery and its segmental branches to suggest pulmonary embolism. Subsegmental pulmonary artery cannot be excluded. ED COURSE/MDM  Patient seen and evaluated. Old records reviewed. Labs and imaging reviewed and results discussed with patient. This is a 41-year-old male presenting with lightheadedness and chest pressure on exertion. The patient arrives with normal vital signs, 95% on room air. He is afebrile and not tachycardic. On exam, he is nontoxic-appearing. He does not describe any symptoms of chest pain or pressure here. His EKG shows a questionable septal infarct in the past, but no acute ST changes today, no changes from prior as far as ST segments or active ischemia. His troponin is negative. I am concerned that he is experiencing anginal symptoms, or a potential dysrhythmia. Therefore I do feel he will require admission for further cardiac work-up. His chest x-ray shows cardiomegaly, he does not have a history of CHF as far as he knows. He has a mildly elevated D-dimer at 242, CTPA is ordered and is negative for large pulmonary embolism although the image was not good quality, and unable to be seen at subsegmental levels. Patient is given small dose of aspirin. Otherwise he does not have an electrolyte abnormality. He will be admitted for further cardiac work-up. Heart score 6. I spoke with Dr. Celio Gould at [address] [place] [address] who accepts.    Repeat troponin is negative. Patient remains hemodynamically stable. HEART SCORE:    History: +2 for high suspicion  \"Highly suspicious\" = High risk features:  middle or left-sided, heavy chest pain, diaphoresis, initiated by exercise, emotions or cold, radiation, N/V, and/or relief of symptoms by sublingual nitrates  \"Moderately suspicious\" = Mixture of high-risk and low-risk features  \"Low suspicious\" = Well localized, sharp pain, non-exertional, no diaphoresis, no N/V  EKG: +1 for nonspecific changes   \"Nonspecific changes\" = repolarization abnormalities, nonspecific T wave changes, nonspecific ST segment depression elevation, bundle branch blocks (even if old), pacemaker rhythm, LVH, early repolarization, digoxin effect   Age: +1 for age 44-72 years  Risk factors (includes HLD, HTN, DM, tobacco use, obesity, and +FHx): +2 for known CAD/prior stroke/PAD or 3+ risk factors  \"Smoking\" = Current or within the past month, \"family history\" = parents, siblings, children with diagnoses of CAD  \"Obesity\" = BMI > 30  Initial troponin: +0 for negative troponin (0-0.04)    Heart score: 6. This falls under the following category: Score of 4-6, which indicates low/moderate risk (12-16.6%) for major adverse cardiac event and supports observation with repeated troponins and/or non-invasive testing      During the patient's ED course, the patient was given:  Medications   ipratropium-albuterol (DUONEB) nebulizer solution 1 ampule (1 ampule Inhalation Given 3/21/21 2304)   0.9 % sodium chloride bolus (0 mLs Intravenous Stopped 3/22/21 0140)   iopamidol (ISOVUE-370) 76 % injection 75 mL (75 mLs Intravenous Given 3/21/21 2330)   aspirin tablet 162 mg (162 mg Oral Given 3/22/21 0051)        CLINICAL IMPRESSION  1. Exertional chest pain    2. Lightheadedness        Blood pressure 106/80, pulse 64, temperature 98.5 °F (36.9 °C), temperature source Oral, resp.  rate 18, height 6' (1.829 m), weight 290 lb (131.5 kg), SpO2 95 %.      Patient was given scripts for the following medications. I counseled patient how to take these medications. Discharge Medication List as of 3/22/2021  3:11 AM          Follow-up with:  No follow-up provider specified. DISCLAIMER: This chart was created using Dragon dictation software. Efforts were made by me to ensure accuracy, however some errors may be present due to limitations of this technology and occasionally words are not transcribed correctly.        Pradip HouseNoland Hospital Montgomeryrajendra  03/22/21 0270

## 2021-03-22 NOTE — ED NOTES
Went over current test results at this time. Dr. Nhan Montague at bedside at this time.      Wicho Rice RN  03/21/21 7200

## 2021-03-22 NOTE — PROGRESS NOTES
Internal Medicine MS3 Progress Note        PCP: Griselda Bees, MD    Date of Admission: 3/22/2021    Chief Complaint: dyspnea on exertion    Hospital Course: Day 1     Subjective: Interval History:   Patient seen at bedside. Denies any acute events overnight. He endorses feeling less short of breath and is not experiencing any chest pain. Patient is able to sit up on the edge of his bed without any lightheadedness or SOB. He does endorse a dry cough for the last week but denies any fevers, chills, headache, abdominal pain, n/v, diarrhea, or dysuria. Patient is scheduled for Lexiscan this morning. Medications:  Reviewed    Infusion Medications   Scheduled Medications    sodium chloride flush  10 mL Intravenous 2 times per day    enoxaparin  40 mg Subcutaneous Daily    famotidine  20 mg Oral BID    aspirin  81 mg Oral Daily    atorvastatin  40 mg Oral Nightly     PRN Meds: sodium chloride flush, promethazine **OR** ondansetron, polyethylene glycol, acetaminophen **OR** acetaminophen, perflutren lipid microspheres, technetium tetrofosmin, technetium tetrofosmin, regadenoson      Intake/Output Summary (Last 24 hours) at 3/22/2021 0953  Last data filed at 3/22/2021 0425  Gross per 24 hour   Intake 0 ml   Output    Net 0 ml       Physical Exam Performed:    /78   Pulse 62   Temp 97.5 °F (36.4 °C) (Oral)   Resp 18   Ht 6' (1.829 m)   Wt (!) 308 lb 10.3 oz (140 kg)   SpO2 95%   BMI 41.86 kg/m²     General appearance: Obese male in no apparent distress, appears stated age and cooperative. Neck: Supple, with full range of motion. No jugular venous distention. Trachea midline. Respiratory:  Normal respiratory effort. Clear to auscultation, bilaterally without Rales/Wheezes/Rhonchi. Cardiovascular: Regular rate and rhythm with normal S1/S2 without murmurs, rubs or gallops. Abdomen: Soft, non-tender, non-distended with normal bowel sounds.   Musculoskeletal: No lower extremity edema bilaterally Skin: Skin color, texture, turgor normal.  No rashes or lesions. Psychiatric: Alert and oriented, thought content appropriate, normal insight  Capillary Refill: Brisk,< 3 seconds   Peripheral Pulses: +2 palpable, equal bilaterally       Labs:   Recent Labs     03/21/21 2134   WBC 9.7   HGB 16.9   HCT 49.3        Recent Labs     03/21/21 2134 03/22/21  0607    138   K 3.9 4.3    105   CO2 27 23   BUN 11 11   CREATININE 0.8* 0.7*   CALCIUM 8.9 8.7   PHOS  --  3.4     No results for input(s): AST, ALT, BILIDIR, BILITOT, ALKPHOS in the last 72 hours. No results for input(s): INR in the last 72 hours. Recent Labs     03/21/21 2134 03/22/21  0138   TROPONINI <0.01 <0.01       Urinalysis:      Lab Results   Component Value Date    NITRU Negative 03/21/2021    WBCUA 0-2 03/21/2021    BACTERIA Rare 03/21/2021    RBCUA 5-10 03/21/2021    BLOODU TRACE-INTACT 03/21/2021    SPECGRAV 1.020 03/21/2021    GLUCOSEU Negative 03/21/2021       Radiology:  NM Cardiac Stress Test Nuclear Imaging    (Results Pending)           Assessment/Plan:    Mimi Thomas, 54 y.o. male w/ PMHx of HLD, HTN, SVT and atrial flutter s/p ablation. Admitted for dyspnea on exertion. Active Hospital Problems    Diagnosis Date Noted    Chest pain [R07.9] 03/22/2021     1. Chest pain, ACS rule out:  Patient presented with CARDENAS, lightheadedness when going from sitting to standing, and chest pain. EKG shows questionable old septal infarct, troponins negative x2. Heart score of 6, JAIDEN score of 1. Lipid panel in August 2020 shows an LDL of 118, HDL of 35. No known recent A1c. He had ablation for a flutter back in 2017, complaining of chest pain, presyncope. Alexandra Posey this morning  -Follow-up on Lyme antibodies. 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  -On telemetry. -A1c pending  -Check orthostatics  -Continue ASA 81 mg PO daily  -Continue atorvastatin 40 mg PO nightly    2.  HLD:  Lipid panel in August 2020 shows an LDL of 118, HDL of 35.   -Continue atorvastatin 40mg nightly.     3 HTN:  Patient complains of chest pain, lightheadedness, dizziness specially on exertion  -Holding home antihypertensives  -Follow-up on orthostatics          DVT Prophylaxis: Lovenox  Diet: Diet NPO Effective Now  Diet NPO, After Midnight  Code Status: Full Code  PT/OT Eval Status: ***  Dispo - GMF    I will discuss the patient with the senior resident and MD Poly Forrest MS3

## 2021-03-22 NOTE — ANESTHESIA PRE-OP
[facility] [address]                                                LEFT HEART CATH    Scott Alvarez   54 y.o., male  1975      3/22/2021    Procedure performed by Dr. Ruthann Hoyos MD, [facility]  Surgical assistants :none    Procedure  Selective Coronary Angiography  Cardiac Catheterization for Coronary Anatomy  Left Heart Catheterization  Left Ventriculogram  Radial artery access assisted by ultrasound  Arterial Access Right Radial Artery after a negative Hugo test  TR Band    Indication:Cardiac cath to rule out ischemic CAD, Possible angioplasty, Aortic valvuloplasty, Consent signed or positive stress test  Unspecified Angina  Anesthesia: Moderate sedation with Versed and Fentanyl IV  Anesthesia Start time 1535  Anesthesia end time 1557  Estimated blood loss :minimal  Abnormal Stress Test      Procedure Description:  After written informed consent was obtained, the patient was   brought to the cardiac catheterization suite, where patient was prepped and   draped in the usual sterile fashion. Local anesthesia was achieved in the   right wrist with 2% lidocaine. A 5-Brazilian hemostasis sheath was placed into   the right radial artery. The pre cocktail of heparin, verapamil and nitroglycerin was injected into the sheath. A 5-Brazilian Wilber catheter was introduced; used to engage the left main   coronary artery. Radiographic  images were obtained. The catheter was pulled back then rotated. The Wilber catheter was introduced  to engage the right coronary   artery. Radiographic  images were obtained. The catheter was removed and exchanged over an exchange length 0.35 soft guide wire. A 5-Brazilian angled pigtail catheter was then positioned in the left ventricle.    Left

## 2021-03-22 NOTE — CONSULTS
Elise Abdalla MD        Current Medications:  Current Facility-Administered Medications   Medication Dose Route Frequency Provider Last Rate Last Admin    sodium chloride flush 0.9 % injection 10 mL  10 mL Intravenous 2 times per day Ekaterina Humphrey, DO   10 mL at 03/22/21 1312    sodium chloride flush 0.9 % injection 10 mL  10 mL Intravenous PRN Ekaterina Humphrey, DO   10 mL at 03/22/21 1055    enoxaparin (LOVENOX) injection 40 mg  40 mg Subcutaneous Daily Ekaterina Humphrey, DO        promethazine (PHENERGAN) tablet 12.5 mg  12.5 mg Oral Q6H PRN Ekaterina Humphrey, DO        Or    ondansetron (ZOFRAN) injection 4 mg  4 mg Intravenous Q6H PRN Ekaterina Humphrey, DO        polyethylene glycol (GLYCOLAX) packet 17 g  17 g Oral Daily PRN Ekaterina Humphrey, DO        acetaminophen (TYLENOL) tablet 650 mg  650 mg Oral Q6H PRN Ekaterina Humphrey, DO   650 mg at 03/22/21 1312    Or    acetaminophen (TYLENOL) suppository 650 mg  650 mg Rectal Q6H PRN Ekaterina Humphrey, DO        famotidine (PEPCID) tablet 20 mg  20 mg Oral BID Ekaterina Humphrey, DO        aspirin chewable tablet 81 mg  81 mg Oral Daily iMchael Comer, DO   81 mg at 03/22/21 1311    atorvastatin (LIPITOR) tablet 40 mg  40 mg Oral Nightly Ekaterina Humphrey, DO           Allergies:  Ibuprofen and Codeine     Review of Systems:   · Constitutional: there has been no unanticipated weight loss. · Eyes: No visual changes or diplopia. No scleral icterus. · ENT: No Headaches, hearing loss or vertigo. No mouth sores or sore throat. · Cardiovascular: Reviewed in HPI  ·  Pulmonary:  no cough or sputum production. · Gastrointestinal: No abdominal pain, appetite loss, blood in stools. No change in bowel or bladder habits. · Genitourinary: No dysuria, trouble voiding, or hematuria. · Musculoskeletal:  No gait disturbance, weakness or joint complaints. · Integumentary: No rash or pruritis. Endocrine: No malaise, fatigue or temperature intolerance.  Hematologic/Lymphatic: No abnormal bruising or bleeding, 03/22/2021    K 4.1 07/15/2018     03/22/2021    CO2 23 03/22/2021    BUN 11 03/22/2021    LABALBU 3.9 03/22/2021    CREATININE 0.7 03/22/2021    CALCIUM 8.7 03/22/2021    GFRAA >60 03/22/2021    GFRAA >60 06/11/2013    LABGLOM >60 03/22/2021     Uric Acid:  No components found for: URIC  PT/INR:    Lab Results   Component Value Date    PROTIME 11.1 10/19/2017    INR 0.98 10/19/2017     Last 3 Troponin:    Lab Results   Component Value Date    TROPONINI <0.01 03/22/2021    TROPONINI <0.01 03/21/2021    TROPONINI <0.01 04/19/2017     FLP:    Lab Results   Component Value Date    TRIG 151 08/13/2020    HDL 35 08/13/2020    LDLCALC 118 08/13/2020    LABVLDL 30 08/13/2020       EKG: Sinus rhythm 76 with diffuse nonspecific ST and T wave changes. echocardiogram Summary 3/22/2021   Technically difficult study. Definity contrast administered. Left ventricular cavity size is normal. There is moderate-severe concentric   left ventricular hypertrophy. Overall left ventricular systolic function   appears normal with an estimated ejection fraction of 55-60%. No regional   wall motion abnormalities are noted. Diastolic filling parameters suggests   grade II diastolic dysfunction. Elevated LVEDP. The aortic root is enlarged, measuring 4.2 cm. Assessment/ Plan     Patient Active Problem List    Diagnosis Date Noted    Chest pain 03/22/2021    Abnormal stress test     Tubular adenoma of colon 09/21/2020    Rectal bleeding     Polycystic kidney     Atrial flutter ([address] 10/19/2017    Stage 2 chronic kidney disease 08/12/2017    Essential hypertension 04/11/2016    SVT (supraventricular tachycardia) ([address] 04/04/2016    Tobacco abuse 04/04/2016   Abnormal stress test suggesting coronary ischemia. Chest pain somewhat atypical for ischemia. This patient needs cardiac cardiac cath. We will proceed accordingly. Results will determine our next set of actions.     Thanks for allowing me the opportunity  to

## 2021-03-22 NOTE — ED NOTES
Dr. Vera Goes at bedside at this time going over test results.      Ollie Hoskins, RN  03/22/21 0372

## 2021-03-22 NOTE — DISCHARGE SUMMARY
INTERNAL MEDICINE    RESIDENT DISCHARGE SUMMARY   Discharge Summaries      Patient ID: Jerson Nagel   Gender: male      :  1975  AGE: 54 y.o. MRN:  [id number]  Code Status: Full Code   PCP: Diamond Resendiz MD    Admit date:  3/22/2021      Discharge date:  21    Admitting Physician:  Diania Pallas, MD    Discharge Physician: Liberty Pearson MD     Discharge Diagnoses: Active Hospital Problems    Diagnosis Date Noted    Chest pain [R07.9] 2021    Sleep apnea-like behavior [G47.39] 2021    Abnormal stress test [R94.39]      The patient was seen and examined on day of discharge and this discharge summary is in conjunction with any daily progress note from day of discharge. Hospital Course:  Patient presented to the [facility] with exertional shortness of breath and chest discomfort. He was admitted for concern for acute coronary syndrome. He was started on aspirin and statin. Patient was noted to have positive orthostatic vitals in the ED. Patient underwent Lexiscan nuclear stress test, which showed a reversible defect in the anterior wall. Echocardiogram revealed grade 2 diastolic dysfunction but normal left ventricular systolic function with EF of 55 to 60%. No regional wall motion abnormalities were noted. Due to his symptoms and his abnormal stress test results patient was taken for cardiac cath. Cardiac cath revealed no evidence of coronary artery disease. Patient's ASCVD risk score was 10.2% and his LDL >100. As such he was discharged on the statin. Patient was observed post procedure without complications and was discharged to home.     Disposition:  Home    Physical Exam Performed:     BP (!) 145/92   Pulse 64   Temp 98.2 °F (36.8 °C) (Oral)   Resp 18   Ht 6' (1.829 m)   Wt (!) 308 lb 10.3 oz (140 kg)   SpO2 99%   BMI 41.86 kg/m²     Physical Exam  General appearance: Obese male, resting comfortably, no apparent distress  HEENT EOMI,

## 2021-03-22 NOTE — LETTER
[address] [facility] [address]  Phone: [phone]             March 22, 2021    Patient: Ray Paiz   YOB: 1975   Date of Visit: 3/22/2021       To Whom It May Concern:    Abdon Villalobos was seen and treated in our facility begining 3/22/2021 . He may return to work on 3/25/21.       Sincerely,       Arti Washington RN         Signature:__________________________________

## 2021-03-22 NOTE — FLOWSHEET NOTE
Patient returned tot he floor. TR band on right radial.  No oozing or hematoma. Patient denies any pain or SOB.  VSS. Wife at bedside. Patient states Daniel Debbei is feeling good and is hungry.   \"  Will monitor cath site closely

## 2021-03-23 ENCOUNTER — CARE COORDINATION (OUTPATIENT)
Dept: CASE MANAGEMENT | Age: 46
End: 2021-03-23

## 2021-03-23 NOTE — CARE COORDINATION
[facility] Transitions Initial Follow Up Call    Call within 2 business days of discharge: yes     Patient:  Dann Pearce Patient :  1975  MRN:  [id number]   Reason for Admission:  covid 19   Discharge Date:  3/22/21  RARS: 9      CTC attempt to reach Pt regarding recent hospital discharge. CTC left voice recording with call back number requesting a call back. Follow up appointments:    No future appointments. JUAN Bernard, RN  Care Transition Coordinator  Contact Number:  [phone]

## 2021-03-24 ENCOUNTER — CARE COORDINATION (OUTPATIENT)
Dept: CASE MANAGEMENT | Age: 46
End: 2021-03-24

## 2021-03-24 LAB — LYME, EIA: 0.38 LIV (ref 0–1.2)

## 2021-03-24 NOTE — CARE COORDINATION
[facility] Transitions Initial Follow Up Call    Call within 2 business days of discharge: Yes    Patient:  Ellis Rawls Patient :  1975  MRN:  [id number]   Reason for Admission:  covid 19   Discharge Date:  3/22/21  RARS: 9      CTC attempt to reach Pt regarding recent hospital discharge. CTC left voice recording with call back number requesting a call back. Follow up appointments:    No future appointments. JUAN Mcrae, RN  Care Transition Coordinator  Contact Number:  [phone]

## 2021-03-28 NOTE — PROGRESS NOTES
Physician Progress Note      PATIENT:               Vanessa Portillo  CSN #:                  [id number]  :                       1975  ADMIT DATE:       3/22/2021 4:09 AM  DISCH DATE:        3/22/2021 7:01 PM  RESPONDING  PROVIDER #:        Ramonita Smith MD          QUERY TEXT:    Pt admitted with chest pain. Pt noted to have positive orthostatics and   stress test positive for ischemia. If possible, please document in progress   notes and discharge summary if you are evaluating and/or treating any of the   following: The medical record reflects the following:  Risk Factors: HTN, CKD, Afib,  Clinical Indicators: Stress test positive for ischemia, underwent LHC with no   signs of ischemia. Per discharge summary:  presented to the [facility] with exertional shortness of breath and chest   discomfort. He was admitted for concern for acute coronary syndrome. He was   started on aspirin and statin. Patient was noted to have positive orthostatic   vitals in the ED. Patient underwent Lexiscan nuclear stress test, which   showed a reversible defect in the anterior wall. Echocardiogram revealed   grade 2 diastolic dysfunction but normal left ventricular systolic function   with EF of 55 to 60%. No regional wall motion abnormalities were noted. Due   to his symptoms and his abnormal stress test re  Treatment: LHC, stress test, orthostatic blood pressures, discharged on   statin. Options provided:  -- Chest pain due to ACS  -- Chest pain due to orthostatic hypotension  -- Other - I will add my own diagnosis  -- Disagree - Not applicable / Not valid  -- Disagree - Clinically unable to determine / Unknown  -- Refer to Clinical Documentation Reviewer    PROVIDER RESPONSE TEXT:    Atypical chest discomfort suspected due to obstructive sleep apnea.     Query created by: Rasta Maxwell on 3/25/2021 1:26 PM      Electronically signed by:  Ramonita Smith MD 3/28/2021 8:02 AM

## 2021-04-01 ENCOUNTER — TELEPHONE (OUTPATIENT)
Dept: PRIMARY CARE CLINIC | Age: 46
End: 2021-04-01

## 2021-04-01 NOTE — TELEPHONE ENCOUNTER
Patients wife called and said that he is supposed to have an MRI asap . However he wasn't able to fit in the machine  So he will need an order for a stand up or open MRI . Can this be done asap before he sees Dr. Audi Gaines.

## 2021-04-05 ENCOUNTER — TELEPHONE (OUTPATIENT)
Dept: PRIMARY CARE CLINIC | Age: 46
End: 2021-04-05

## 2021-04-06 ENCOUNTER — TELEPHONE (OUTPATIENT)
Dept: PRIMARY CARE CLINIC | Age: 46
End: 2021-04-06

## 2021-04-06 ENCOUNTER — OFFICE VISIT (OUTPATIENT)
Dept: PRIMARY CARE CLINIC | Age: 46
End: 2021-04-06
Payer: COMMERCIAL

## 2021-04-06 VITALS
TEMPERATURE: 97.3 F | WEIGHT: 306.2 LBS | DIASTOLIC BLOOD PRESSURE: 79 MMHG | BODY MASS INDEX: 42.87 KG/M2 | HEIGHT: 71 IN | HEART RATE: 69 BPM | SYSTOLIC BLOOD PRESSURE: 115 MMHG

## 2021-04-06 DIAGNOSIS — I10 ESSENTIAL HYPERTENSION: Primary | ICD-10-CM

## 2021-04-06 DIAGNOSIS — Z72.0 TOBACCO ABUSE: ICD-10-CM

## 2021-04-06 DIAGNOSIS — E66.01 CLASS 3 SEVERE OBESITY DUE TO EXCESS CALORIES WITHOUT SERIOUS COMORBIDITY WITH BODY MASS INDEX (BMI) OF 40.0 TO 44.9 IN ADULT (HCC): ICD-10-CM

## 2021-04-06 DIAGNOSIS — H34.9 RETINAL ARTERY OCCLUSION: ICD-10-CM

## 2021-04-06 PROCEDURE — 99215 OFFICE O/P EST HI 40 MIN: CPT | Performed by: FAMILY MEDICINE

## 2021-04-06 RX ORDER — LOSARTAN POTASSIUM 25 MG/1
25 TABLET ORAL DAILY
COMMUNITY
End: 2021-10-26 | Stop reason: SDUPTHER

## 2021-04-06 RX ORDER — ASPIRIN 81 MG/1
81 TABLET, CHEWABLE ORAL DAILY
COMMUNITY

## 2021-04-06 ASSESSMENT — ENCOUNTER SYMPTOMS
COUGH: 0
VOMITING: 0
SHORTNESS OF BREATH: 0
NAUSEA: 0
DIARRHEA: 0
CONSTIPATION: 0
ABDOMINAL PAIN: 0

## 2021-04-06 ASSESSMENT — PATIENT HEALTH QUESTIONNAIRE - PHQ9
SUM OF ALL RESPONSES TO PHQ QUESTIONS 1-9: 0
1. LITTLE INTEREST OR PLEASURE IN DOING THINGS: 0
2. FEELING DOWN, DEPRESSED OR HOPELESS: 0
SUM OF ALL RESPONSES TO PHQ QUESTIONS 1-9: 0
SUM OF ALL RESPONSES TO PHQ QUESTIONS 1-9: 0

## 2021-04-06 NOTE — TELEPHONE ENCOUNTER
----- Message from Jessica Manuel sent at 4/6/2021  5:06 PM EDT -----  Subject: Message to Provider    QUESTIONS  Information for Provider? Patient tried returning phone call and could not   get through please return patient phone call   ---------------------------------------------------------------------------  --------------  [address]  What is the best way for the office to contact you? OK to leave message on   voicemail  Preferred Call Back Phone Number? [phone]  ---------------------------------------------------------------------------  --------------  SCRIPT ANSWERS  Relationship to Patient? Other  Representative Name? Lucy Mead   Is the Representative on the appropriate HIPAA document in Epic?  Yes

## 2021-04-06 NOTE — PATIENT INSTRUCTIONS
MD Lukas Ramos/[facility] [address] [facility], [address] [place] [address], [address] [place] [address]  Phone: [phone]

## 2021-04-06 NOTE — PROGRESS NOTES
Chief Complaint   Patient presents with    Obesity       HPI: Parul Lorenzana presents for evaluation and management of dizziness and loss of vision. He also is following up on hypertension obesity and tobacco use. Patient notes that over the last 2 weeks he has had multiple abnormal neurologic symptoms. He had an episode of dizziness in mid March while on vacation in [place]. He went to the emergency room at the direction of one of his friends who is a  and had extensive diagnostic work-up. Subsequently he noted he lost vision in his left eye and was diagnosed with a retinal artery occlusion. He has a history of atrial flutter and was started on Eliquis when he started having the symptoms but has not had any MRI of his brain or dilation of his intracranial arteries yet. He is taking tolerating his blood pressure medicine. He notes he quit smoking tobacco about a week ago because of all the symptoms. Review of Systems   Constitutional: Negative for chills and fever. Respiratory: Negative for cough and shortness of breath. Cardiovascular: Negative for chest pain and palpitations. Gastrointestinal: Negative for abdominal pain, constipation, diarrhea, nausea and vomiting. Endocrine: Negative for polyuria. Genitourinary: Negative for dysuria. Allergies   Allergen Reactions    Ibuprofen      kidneys    Codeine Rash     New Prescriptions    No medications on file     Current Outpatient Medications   Medication Sig Dispense Refill    losartan (COZAAR) 25 MG tablet Take 25 mg by mouth daily      aspirin 81 MG chewable tablet Take 81 mg by mouth daily      apixaban (ELIQUIS) 5 MG TABS tablet Take 5 mg by mouth daily      metoprolol succinate (TOPROL XL) 25 MG extended release tablet Take 1 tablet by mouth daily 90 tablet 1    atorvastatin (LIPITOR) 40 MG tablet Take 1 tablet by mouth nightly 30 tablet 3     No current facility-administered medications for this visit. Past Medical History:   Diagnosis Date    CKD (chronic kidney disease)     HTN (hypertension)     Polycystic kidney     Dr. Dayo Grimaldo    [facility]par[facility]     Stage 2 chronic kidney disease 8/12/2017    Tubular adenoma of colon 09/21/2020    Needs 3 year follow up. Objective   /79   Pulse 69   Temp 97.3 °F (36.3 °C) (Temporal)   Ht 5' 10.7\" (1.796 m)   Wt (!) 306 lb 3.2 oz (138.9 kg)   BMI 43.07 kg/m²   Wt Readings from Last 3 Encounters:   04/06/21 (!) 306 lb 3.2 oz (138.9 kg)   03/22/21 (!) 308 lb 10.3 oz (140 kg)   03/21/21 290 lb (131.5 kg)     . phq  Physical Exam  Constitutional:       Appearance: He is well-developed. He is obese. Cardiovascular:      Rate and Rhythm: Normal rate and regular rhythm. Heart sounds: No murmur. No friction rub. No gallop. Pulmonary:      Effort: Pulmonary effort is normal.      Breath sounds: Normal breath sounds. No wheezing or rales. Abdominal:      General: Bowel sounds are normal. There is no distension. Palpations: Abdomen is soft. There is no mass. Tenderness: There is no abdominal tenderness. Skin:     General: Skin is warm and dry. Findings: No rash.            Chemistry        Component Value Date/Time     03/22/2021 0607    K 4.3 03/22/2021 0607    K 4.1 07/15/2018 2007     03/22/2021 0607    CO2 23 03/22/2021 0607    BUN 11 03/22/2021 0607    CREATININE 0.7 (L) 03/22/2021 0607        Component Value Date/Time    CALCIUM 8.7 03/22/2021 0607    ALKPHOS 65 08/13/2020 1030    AST 25 08/13/2020 1030    ALT 42 (H) 08/13/2020 1030    BILITOT 0.5 08/13/2020 1030          Lab Results   Component Value Date    WBC 9.7 03/21/2021    HGB 16.9 03/21/2021    HCT 49.3 03/21/2021    MCV 93.4 03/21/2021     03/21/2021     Lab Results   Component Value Date    LABA1C 5.3 03/22/2021     Lab Results   Component Value Date    .4 03/22/2021     Lab Results   Component Value Date LABA1C 5.3 03/22/2021     No components found for: CHLPL  Lab Results   Component Value Date    TRIG 151 (H) 08/13/2020    TRIG 232 (H) 08/15/2016     Lab Results   Component Value Date    HDL 35 (L) 08/13/2020    HDL 28 (L) 08/15/2016     Lab Results   Component Value Date    LDLCALC 118 (H) 08/13/2020    LDLCALC 94 08/15/2016     Lab Results   Component Value Date    LABVLDL 30 08/13/2020    LABVLDL 46 08/15/2016         Assessment   Plan     1. Essential hypertension  Controlled: Appears stable. We will continue current management and monitor for adverse reaction and disease progression. Follow-up as noted belowtable      2. Class 3 severe obesity due to excess calories without serious comorbidity with body mass index (BMI) of 40.0 to 44.9 in adult [facility])  Counseled patient on diet and exercise    3. Tobacco abuse  Smoking cessation    4. Retinal artery occlusion  Differential diagnosis includes thromboembolic phenomenon from history of atrial flutter-Eliquis is appropriate. He needs his intracranial arteries evaluated. We need to make sure that his cholesterol continues to be well treated with medication. Inflammatory conditions are also a possibility though less likely. He is at significant risk for loss of function due to current visual complaints. I referred him to Dr. Dyana Martínez for further evaluation and set up MRIs of his brain. We will see him back in 1 month and he is to continue his current medication  - MRI [address]RAIN [address]; Future  - [facility] - Delfino Echevarria MD, Ophthalmology, [facility]  - [facility] [address]; Future    Discussed use, benefit, and side effects of prescribed medications. Barriers to medication compliance addressed. All patient questions answered. Pt voiced understanding. RTC Return in about 4 weeks (around 5/4/2021).

## 2021-04-13 ENCOUNTER — PATIENT MESSAGE (OUTPATIENT)
Dept: PRIMARY CARE CLINIC | Age: 46
End: 2021-04-13

## 2021-04-13 DIAGNOSIS — F40.240 CLAUSTROPHOBIA: Primary | ICD-10-CM

## 2021-04-13 RX ORDER — LORAZEPAM 1 MG/1
1 TABLET ORAL DAILY PRN
Qty: 12 TABLET | Refills: 0 | Status: SHIPPED | OUTPATIENT
Start: 2021-04-13 | End: 2021-05-13

## 2021-04-13 NOTE — TELEPHONE ENCOUNTER
From: Carline Martins  To: Mil Mcgee MD  Sent: 4/13/2021 3:44 PM EDT  Subject: Prescription Question    Hi Dr Foster Lyn I tried to do the mri today and I can't I freak out I even did the open one the nurse there said maybe you can give me some kind of medication to claim me down

## 2021-04-28 NOTE — PROCEDURES
[facility] CATH     Tessa Drew   54 y.o., male  1975        3/22/2021     Procedure performed by Dr. Maninder Willis MD, [facility]  Surgical assistants :none     Procedure  Selective Coronary Angiography  Cardiac Catheterization for Coronary Anatomy  Left Heart Catheterization  Left Ventriculogram  Radial artery access assisted by ultrasound  Arterial Access Right Radial Artery after a negative Hugo test  TR Band     Indication:Cardiac cath to rule out ischemic CAD, Possible angioplasty, Aortic valvuloplasty, Consent signed or positive stress test  Unspecified Angina  Anesthesia: Moderate sedation with Versed and Fentanyl IV  Anesthesia Start time 1535  Anesthesia end time 1557  Estimated blood loss :minimal  Abnormal Stress Test        Procedure Description:  After written informed consent was obtained, the patient was   brought to the cardiac catheterization suite, where patient was prepped and   draped in the usual sterile fashion. Local anesthesia was achieved in the   right wrist with 2% lidocaine. A 5-Paraguayan hemostasis sheath was placed into   the right radial artery. The pre cocktail of heparin, verapamil and nitroglycerin was injected into the sheath.      A 5-[place] Wilber catheter was introduced; used to engage the left main   coronary artery. Radiographic  images were obtained. The catheter was pulled back then rotated.      The Wilber catheter was introduced  to engage the right coronary   artery. Radiographic  images were obtained. The catheter was removed and exchanged over an exchange length 0.35 soft guide wire.      A 5-[place]ayan angled pigtail catheter was then positioned in the left ventricle. Left ventriculogram was performed. After these images were obtained. , the   catheter was flushed, pulled back across the aortic valve revealing no gradient. The catheter was removed.        The hemostasis sheath was removed and hemostasis was achieved using a TR Band      There were no complications. Patient tolerated the procedure well. The   patient was transferred to the holding area in stable condition.      Contrast consumed 100 cc  Flouro time 2.5 minutes     Results:  Left ventricular pressure 9 mmHg  Aortic pressure 136 mmHg     Coronary anatomy:   The left main coronary artery is normal.      Left anterior descending artery is normal     Circumflex artery is normal.     The right coronary artery is a dominant vessel and normal.      Left ventriculogram shows ejection fraction of 55%.  Normal wall motion.        Impression:  No evidence   coronary artery disease  False positive stress test      Complications: none        Plan:  Primary prevention  Medical management        This note was likely completed using voice recognition technology and may contain unintended errors  Jayjay Dempsey M.D., Loli Benavidez; Wade Ferraro MD

## 2021-04-29 ENCOUNTER — TELEPHONE (OUTPATIENT)
Dept: PRIMARY CARE CLINIC | Age: 46
End: 2021-04-29

## 2021-04-29 DIAGNOSIS — H34.9 RETINAL ARTERY OCCLUSION: Primary | ICD-10-CM

## 2021-08-16 NOTE — PROGRESS NOTES
[address] Office consultation Note  8/17/2021   Ref by  Dileep Vasquez MD  Subjective:  Mr. Pankaj Burton is a new patient to me today. He has a PMH of SVT s/p Ablation 2017, a-flutter ablation , HLD. LHC 3/22/21 showed normal cors. C/o legs swelling for 6 months intermittently luis antonio when he is up on them for a couple of hrs. He also has noticed swelling of his hands. He gets SOB with little exertion. He has noticed palpitations at times. [place]:    He was admitted to the hospital 3/21/21 for CP. He had an abnormal stress test. Subsequently had LHC which showed normal cors. Today he reports recently went on a fishing trip and notice swelling of legs and hands. Reports any time he is on his feet or walking for a couple hours he develops edema of feet, hands, and legs. No orthopnea. He has SOB with exertion. He denies SOB lying flat. Denies waking up  at night due to shortness of  breath. He reports intermittent fast heart rate. Denies CP with exertion. He is fatigued. Reports In March he had stroke and lost some vision in left eye. Wife reports he stops breathing at night but has not had formal testing. Works as . 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 Social History: He works as . reports that he has been smoking cigarettes. He has been smoking about 1.00 pack per day. He has never used smokeless tobacco. He reports current alcohol use.  He reports that he does not use drugs.      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 Medina Necessary    PSVT (paroxysmal supraventricular tachycardia) [facility] [address]     Stage 2 chronic kidney disease 08/12/2017    Tubular adenoma of colon 09/21/2020    Needs 3 year follow up. Past Surgical History:   Procedure Laterality Date    CARDIAC CATHETERIZATION  2008    CARDIAC CATHETERIZATION  03/22/2021    Dr. Maria Nose: no obstruction noted    COLONOSCOPY  09/15/2020    with biopsy    COLONOSCOPY N/A 09/15/2020    COLONOSCOPY WITH BIOPSY performed by Alejandra Solomon MD at [place] [address]  2001    Dr. Rosy ALVARADO flank hematoma removal       Objective: repeat BP same  BP (!) 146/84   Pulse 65   Ht 5' 11\" (1.803 m)   Wt 300 lb (136.1 kg)   SpO2 94%   BMI 41.84 kg/m²     Wt Readings from Last 3 Encounters:   08/17/21 300 lb (136.1 kg)   04/06/21 (!) 306 lb 3.2 oz (138.9 kg)   03/22/21 (!) 308 lb 10.3 oz (140 kg)       Physical Exam:  General: No Respiratory distress, appears well developed and well nourished. Eyes:  Sclera nonicteric  Nose/Sinuses:  negative findings: nose shows no deformity, asymmetry, or inflammation, nasal mucosa normal, septum midline with no perforation or bleeding  Back:  no pain to palpation  Joint:  no active joint inflammation  Musculoskeletal:  negative  Skin:  Warm and dry  Neck:  Negative for JVD and Carotid Bruits. Chest:  Clear to auscultation, respiration easy  Cardiovascular:  RRR, S1S2 normal, 2/6 MONSE all over the heart, no rub or thrill.   Abdomen:  Soft normal liver and spleen  Extremities:   No edema, clubbing, cyanosis,  Pulses:  pedal pulses are normal.  Neuro: intact    Medications:   Outpatient Encounter Medications as of 8/17/2021   Medication Sig Dispense Refill    furosemide (LASIX) 20 MG tablet Take 1 tablet by mouth daily 90 tablet 1    apixaban (ELIQUIS) 5 MG TABS tablet Take 1 tablet by mouth 2 times daily 60 tablet 5    losartan (COZAAR) 25 MG tablet Take 25 mg by mouth daily      aspirin 81 MG chewable tablet Take 81 mg by mouth daily      metoprolol succinate (TOPROL XL) 25 MG extended release tablet Take 1 tablet by mouth daily 90 tablet 1    atorvastatin (LIPITOR) 40 MG tablet Take 1 tablet by mouth nightly 30 tablet 3     No facility-administered encounter medications on file as of 8/17/2021. Lab Data:  CBC: No results for input(s): WBC, HGB, HCT, MCV, PLT in the last 72 hours. BMP: No results for input(s): NA, K, CL, CO2, PHOS, BUN, CREATININE in the last 72 hours. Invalid input(s): CA  LIVER PROFILE: No results for input(s): AST, ALT, LIPASE, BILIDIR, BILITOT, ALKPHOS in the last 72 hours. Invalid input(s): AMYLASE,  ALB  LIPID:   Lab Results   Component Value Date    CHOL 183 08/13/2020    CHOL 168 08/15/2016     Lab Results   Component Value Date    TRIG 151 (H) 08/13/2020    TRIG 232 (H) 08/15/2016     Lab Results   Component Value Date    HDL 35 (L) 08/13/2020    HDL 28 (L) 08/15/2016     Lab Results   Component Value Date    LDLCALC 118 (H) 08/13/2020    LDLCALC 94 08/15/2016     Lab Results   Component Value Date    LABVLDL 30 08/13/2020    LABVLDL 46 08/15/2016     No results found for: CHOLHDLRATIO  PT/INR: No results for input(s): PROTIME, INR in the last 72 hours. A1C:   Lab Results   Component Value Date    LABA1C 5.3 03/22/2021     BNP:  No results for input(s): BNP in the last 72 hours. IMAGING:   I have reviewed the following tests and documented in this encounter as follows:   Discussed with patient. EKG 8.17.21 NSR Early repolarization changes  MRI brain 4/23/21   CONCLUSION: 1. 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  2. Mild multifocal white matter changes involving the cerebral hemispheres bilaterally compatible with mild chronic ischemic changes in the small-vessel distribution/arteriopathic leukoaraiosis secondary to underlying microvascular disease. CT PA chest 3/21/21   Pulmonary arteries are not optimally opacified for evaluation. No evidence of intraluminal filling within the main pulmonary artery and its segmental branches to suggest pulmonary embolism. Subsegmental pulmonary artery cannot be excluded       CXR 3/21/21   No radiographic evidence of acute cardiopulmonary disease. Mild cardiomegaly. LHC 3/22/21   Left ventricular pressure 9 mmHg  Aortic pressure 136 mmHg     Coronary anatomy:   The left main coronary artery is normal.      Left anterior descending artery is normal     Circumflex artery is normal.     The right coronary artery is a dominant vessel and normal.      Left ventriculogram shows ejection fraction of 55%. Normal wall motion. Idelle Puller 3/22/21   Summary There is a small sized mild intensity reversible defect of the distal  anterior wall. This is consistent with ischemia. The LVEF is 52% with normal LV wall motion. ECHO 3/22/21   Summary   Technically difficult study. Definity contrast administered. Left ventricular cavity size is normal. There is moderate-severe concentric   left ventricular hypertrophy. Overall left ventricular systolic function   appears normal with an estimated ejection fraction of 55-60%. No regional   wall motion abnormalities are noted. Diastolic filling parameters suggests   grade II diastolic dysfunction. Elevated LVEDP. The aortic root is enlarged, measuring 4.2 cm. EKG 3/21/21   Normal sinus rhythmSeptal infarct prior cannot be ruled outAbnormal ECGWhen compared with ECG of 19-OCT-2017 07:08,No significant change was foundConfirmed by Jennifer Chavira MD, GLENDA ([id number]) on 3/22/2021 7:26:27 AM     Ablation 10/24/17   CONCLUSION:  1. Complex electrophysiology study with normal baseline conduction  parameters.   2.  Status post typical AV abhijeet re-entry tachycardia and atrial  flutter ablation. 3.  Direct current cardioversion for AFib.    ECHO 4/12/16   Summary   Normal left ventricle size with mild concentric left ventricular   hypertrophy. Normal systolic function with an estimated ejection fraction of 55%. No regional wall motion abnormalities are seen. Diastolic filling parameters suggest normal diastolic filing pressure. Assessment:  Symptoms of edema  CARDENAS palpitations without syncope. D/D cardiac Vs renal unlikely it is liver disease. S/p atrial flutter and AVNRT ablation 2017  Polycystic kidney disease followed by Melissa Borja not see him for some time. Essential hypertension  Hyperlipidemia  Probable sleep apnea    1. SVT (supraventricular tachycardia) ([address]    2. Typical atrial flutter ([address]    3. Cerebrovascular accident (CVA), unspecified mechanism ([address]    4. Edema, unspecified type    5. SOB (shortness of breath) on exertion    6. Murmur, cardiac         Plan:  Orders Placed This Encounter   Procedures    CBC    Comprehensive Metabolic Panel    Lipid, Fasting    TSH with Reflex    MICROSCOPIC URINALYSIS    Urinalysis    Julianne Vieira MD, Sleep Medicine, [facility]    EKG 12 Lead    Echo 2D w doppler w color complete     1. Medications reviewed   2. Labs CMP, CBC, fasting lipids, TSH, and UA  3. Will check ECHO to assess heart function and structure. Call central scheduling [phone] to schedule testing. 4. Will start lasix 20 mg daily in view of edema and SOB   5. Follow up with Dr. Ela Currie- nephrology  6. Follow low sodium diet. Restrict fluids to 64 oz or less daily. 7. Recommend  you have formal evaluation for sleep apnea. Referral placed to Dr. Mariposa Rice    8. Follow up with me 8 weeks    This note was scribed in the presence of Cara Roberts MD by Lizbeth Leblanc RN       QUALITY MEASURES  1. Tobacco Cessation Counseling: NA   2. Retake of BP if >140/90: NA   3.  Documentation to PCP/referring for new patient:  Sent to PCP at close of office visit  4. CAD patient on anti-platelet: ASA   5. CAD patient on STATIN therapy: HLD- Lipitor    6. Patient with CHF and aFib on anticoagulation: Marjorie Edouard, Dr. Laurent Agarwal, personally performed the services described in this documentation, as scribed by the above signed scribe in my presence. It is both accurate and complete to my knowledge. I agree with the details independently gathered by the clinical support staff, while the remaining scribed note accurately describes my personal service to the patient. Sergo Thurston MD, MD 8/17/2021 10:24 AM  Tobacco Cessation Counseling: Patient advised about behavior change, including information about personal health harms, usage of appropriate cessation measures and benefits of cessation.   Time spent (minutes):

## 2021-08-17 ENCOUNTER — HOSPITAL ENCOUNTER (OUTPATIENT)
Age: 46
Discharge: HOME | End: 2021-08-17
Payer: COMMERCIAL

## 2021-08-17 ENCOUNTER — OFFICE VISIT (OUTPATIENT)
Dept: CARDIOLOGY CLINIC | Age: 46
End: 2021-08-17
Payer: COMMERCIAL

## 2021-08-17 VITALS
HEIGHT: 71 IN | SYSTOLIC BLOOD PRESSURE: 146 MMHG | WEIGHT: 300 LBS | BODY MASS INDEX: 42 KG/M2 | HEART RATE: 65 BPM | DIASTOLIC BLOOD PRESSURE: 84 MMHG | OXYGEN SATURATION: 94 %

## 2021-08-17 DIAGNOSIS — I48.3 TYPICAL ATRIAL FLUTTER (HCC): ICD-10-CM

## 2021-08-17 DIAGNOSIS — R06.02 SOB (SHORTNESS OF BREATH) ON EXERTION: ICD-10-CM

## 2021-08-17 DIAGNOSIS — I47.1 SVT (SUPRAVENTRICULAR TACHYCARDIA) (HCC): ICD-10-CM

## 2021-08-17 DIAGNOSIS — R01.1 MURMUR, CARDIAC: ICD-10-CM

## 2021-08-17 DIAGNOSIS — R60.9 EDEMA, UNSPECIFIED TYPE: ICD-10-CM

## 2021-08-17 DIAGNOSIS — I63.9 CEREBROVASCULAR ACCIDENT (CVA), UNSPECIFIED MECHANISM (HCC): ICD-10-CM

## 2021-08-17 DIAGNOSIS — R60.9 EDEMA, UNSPECIFIED TYPE: Primary | ICD-10-CM

## 2021-08-17 LAB
A/G RATIO: 1.5 (ref 1.1–2.2)
ALBUMIN SERPL-MCNC: 4.2 G/DL (ref 3.4–5)
ALP BLD-CCNC: 77 U/L (ref 40–129)
ALT SERPL-CCNC: 23 U/L (ref 10–40)
ANION GAP SERPL CALCULATED.3IONS-SCNC: 10 MMOL/L (ref 3–16)
AST SERPL-CCNC: 15 U/L (ref 15–37)
BACTERIA: ABNORMAL /HPF
BILIRUB SERPL-MCNC: 0.4 MG/DL (ref 0–1)
BILIRUBIN URINE: NEGATIVE
BLOOD, URINE: ABNORMAL
BUN BLDV-MCNC: 8 MG/DL (ref 7–20)
CALCIUM SERPL-MCNC: 9.5 MG/DL (ref 8.3–10.6)
CHLORIDE BLD-SCNC: 106 MMOL/L (ref 99–110)
CHOLESTEROL, FASTING: 99 MG/DL (ref 0–199)
CLARITY: CLEAR
CO2: 24 MMOL/L (ref 21–32)
COLOR: YELLOW
CREAT SERPL-MCNC: 0.6 MG/DL (ref 0.9–1.3)
EPITHELIAL CELLS, UA: ABNORMAL /HPF (ref 0–5)
GFR AFRICAN AMERICAN: >60
GFR NON-AFRICAN AMERICAN: >60
GLOBULIN: 2.8 G/DL
GLUCOSE BLD-MCNC: 110 MG/DL (ref 70–99)
GLUCOSE URINE: NEGATIVE MG/DL
HCT VFR BLD CALC: 48.2 % (ref 40.5–52.5)
HDLC SERPL-MCNC: 30 MG/DL (ref 40–60)
HEMOGLOBIN: 17 G/DL (ref 13.5–17.5)
KETONES, URINE: NEGATIVE MG/DL
LDL CHOLESTEROL CALCULATED: 45 MG/DL
LEUKOCYTE ESTERASE, URINE: NEGATIVE
MCH RBC QN AUTO: 32.3 PG (ref 26–34)
MCHC RBC AUTO-ENTMCNC: 35.3 G/DL (ref 31–36)
MCV RBC AUTO: 91.5 FL (ref 80–100)
MICROSCOPIC EXAMINATION: YES
MUCUS: ABNORMAL /LPF
NITRITE, URINE: NEGATIVE
PDW BLD-RTO: 12.9 % (ref 12.4–15.4)
PH UA: 7.5 (ref 5–8)
PLATELET # BLD: 171 K/UL (ref 135–450)
PMV BLD AUTO: 8.4 FL (ref 5–10.5)
POTASSIUM SERPL-SCNC: 4.1 MMOL/L (ref 3.5–5.1)
PROTEIN UA: NEGATIVE MG/DL
RBC # BLD: 5.26 M/UL (ref 4.2–5.9)
RBC UA: ABNORMAL /HPF (ref 0–4)
SODIUM BLD-SCNC: 140 MMOL/L (ref 136–145)
SPECIFIC GRAVITY UA: 1.01 (ref 1–1.03)
TOTAL PROTEIN: 7 G/DL (ref 6.4–8.2)
TRIGLYCERIDE, FASTING: 121 MG/DL (ref 0–150)
TSH REFLEX: 0.5 UIU/ML (ref 0.27–4.2)
URINE TYPE: ABNORMAL
UROBILINOGEN, URINE: 0.2 E.U./DL
VLDLC SERPL CALC-MCNC: 24 MG/DL
WBC # BLD: 7.4 K/UL (ref 4–11)
WBC UA: ABNORMAL /HPF (ref 0–5)

## 2021-08-17 PROCEDURE — 80061 LIPID PANEL: CPT

## 2021-08-17 PROCEDURE — 99244 OFF/OP CNSLTJ NEW/EST MOD 40: CPT | Performed by: INTERNAL MEDICINE

## 2021-08-17 PROCEDURE — 81001 URINALYSIS AUTO W/SCOPE: CPT

## 2021-08-17 PROCEDURE — 1111F DSCHRG MED/CURRENT MED MERGE: CPT | Performed by: INTERNAL MEDICINE

## 2021-08-17 PROCEDURE — 36415 COLL VENOUS BLD VENIPUNCTURE: CPT

## 2021-08-17 PROCEDURE — 80053 COMPREHEN METABOLIC PANEL: CPT

## 2021-08-17 PROCEDURE — 85027 COMPLETE CBC AUTOMATED: CPT

## 2021-08-17 PROCEDURE — 84443 ASSAY THYROID STIM HORMONE: CPT

## 2021-08-17 PROCEDURE — 93000 ELECTROCARDIOGRAM COMPLETE: CPT | Performed by: INTERNAL MEDICINE

## 2021-08-17 RX ORDER — FUROSEMIDE 20 MG/1
20 TABLET ORAL DAILY
Qty: 90 TABLET | Refills: 1 | Status: SHIPPED
Start: 2021-08-17 | End: 2021-09-28 | Stop reason: ALTCHOICE

## 2021-08-17 NOTE — LETTER
[facility] [id number]  Phone: [phone]  Fax: [phone]    Robert Marie MD    August 17, 2021     Maryjane Raymond MD  [address] [place] [address]    Patient: Bran Gomez   MR Number: <[id number]>   YOB: 1975   Date of Visit: 8/17/2021       Dear Maryjane Raymond:    Thank you for referring Britni Laguerre to me for evaluation/treatment. Below are the relevant portions of my assessment and plan of care. If you have questions, please do not hesitate to call me. I look forward to following Collins Shea along with you.     Sincerely,       Robert Marie MD

## 2021-08-17 NOTE — PATIENT INSTRUCTIONS
1. Medications reviewed   2. Labs CMP, CBC, fasting lipids, TSH, and UA  3. Will check ECHO to assess heart function and structure. Call central scheduling [phone] to schedule testing. 4. Will start lasix 20 mg daily in view of edema and SOB   5. Follow up with Dr. Hemanth Turner  6. Follow low sodium diet. Restrict fluids to 64 oz or less daily. 7. Recommend  you have formal evaluation for sleep apnea. Referral placed to Dr. Pedro Pablo Santana    8.  Follow up with me

## 2021-08-18 ENCOUNTER — OFFICE VISIT (OUTPATIENT)
Dept: PRIMARY CARE CLINIC | Age: 46
End: 2021-08-18
Payer: COMMERCIAL

## 2021-08-18 VITALS
WEIGHT: 303 LBS | BODY MASS INDEX: 42.42 KG/M2 | HEIGHT: 71 IN | SYSTOLIC BLOOD PRESSURE: 149 MMHG | HEART RATE: 73 BPM | DIASTOLIC BLOOD PRESSURE: 84 MMHG

## 2021-08-18 DIAGNOSIS — L30.4 INTERTRIGO: Primary | ICD-10-CM

## 2021-08-18 DIAGNOSIS — L98.9 SKIN LESION OF BACK: ICD-10-CM

## 2021-08-18 DIAGNOSIS — L91.8 SKIN TAG: ICD-10-CM

## 2021-08-18 PROBLEM — R07.9 CHEST PAIN: Status: RESOLVED | Noted: 2021-03-22 | Resolved: 2021-08-18

## 2021-08-18 PROBLEM — R60.9 EDEMA: Status: RESOLVED | Noted: 2021-08-17 | Resolved: 2021-08-18

## 2021-08-18 PROBLEM — R06.02 SOB (SHORTNESS OF BREATH) ON EXERTION: Status: RESOLVED | Noted: 2021-08-17 | Resolved: 2021-08-18

## 2021-08-18 PROBLEM — D12.6 TUBULAR ADENOMA OF COLON: Status: RESOLVED | Noted: 2020-09-21 | Resolved: 2021-08-18

## 2021-08-18 LAB
BILIRUBIN, POC: NEGATIVE
BLOOD URINE, POC: NORMAL
CLARITY, POC: CLEAR
COLOR, POC: YELLOW
GLUCOSE URINE, POC: NEGATIVE
KETONES, POC: NEGATIVE
LEUKOCYTE EST, POC: NEGATIVE
NITRITE, POC: NEGATIVE
PH, POC: 7
PROTEIN, POC: NORMAL
SPECIFIC GRAVITY, POC: 1.02
UROBILINOGEN, POC: 0.2

## 2021-08-18 PROCEDURE — 99214 OFFICE O/P EST MOD 30 MIN: CPT | Performed by: STUDENT IN AN ORGANIZED HEALTH CARE EDUCATION/TRAINING PROGRAM

## 2021-08-18 PROCEDURE — 81002 URINALYSIS NONAUTO W/O SCOPE: CPT | Performed by: STUDENT IN AN ORGANIZED HEALTH CARE EDUCATION/TRAINING PROGRAM

## 2021-08-18 RX ORDER — KETOCONAZOLE 20 MG/G
CREAM TOPICAL
Qty: 30 G | Refills: 1 | Status: SHIPPED | OUTPATIENT
Start: 2021-08-18

## 2021-08-18 SDOH — ECONOMIC STABILITY: FOOD INSECURITY: WITHIN THE PAST 12 MONTHS, THE FOOD YOU BOUGHT JUST DIDN'T LAST AND YOU DIDN'T HAVE MONEY TO GET MORE.: NEVER TRUE

## 2021-08-18 SDOH — ECONOMIC STABILITY: FOOD INSECURITY: WITHIN THE PAST 12 MONTHS, YOU WORRIED THAT YOUR FOOD WOULD RUN OUT BEFORE YOU GOT MONEY TO BUY MORE.: NEVER TRUE

## 2021-08-18 ASSESSMENT — SOCIAL DETERMINANTS OF HEALTH (SDOH): HOW HARD IS IT FOR YOU TO PAY FOR THE VERY BASICS LIKE FOOD, HOUSING, MEDICAL CARE, AND HEATING?: NOT HARD AT ALL

## 2021-08-18 ASSESSMENT — ENCOUNTER SYMPTOMS
NAUSEA: 0
DIARRHEA: 0
COLOR CHANGE: 1
VOMITING: 0

## 2021-08-18 NOTE — PROGRESS NOTES
2021     Lenard Ramesh (:  1975) is a 54 y.o. male, here for evaluation of the following medical concerns:    HPI  Rash groin: Yvonne Holt presents today for evaluation of a rash in his groin. Patient states the rash has been present where his thigh meets his groin for several weeks. The skin is very red and irritated. He has a lot of discomfort in this area. He has had no fevers or chills. He has tried over-the-counter Benadryl and steroid cream with no response. Skin Lesion: Patient complains of a skin lesion of the back. The lesion has been present for several years. Lesion has changed in 6 months. Symptoms associated with the lesion are: increasing diameter, increasing thickness, itching, bleeding, unsightliness. Patient denies increasing thickness, increasing number of lesions, darkening color. Review of Systems   Constitutional: Negative for chills and fever. Gastrointestinal: Negative for diarrhea, nausea and vomiting. Skin: Positive for color change and rash. Neurological: Negative for dizziness. Prior to Visit Medications    Medication Sig Taking? Authorizing Provider   ketoconazole (NIZORAL) 2 % cream Apply topically daily.  Yes Ghada Edmonds DO   furosemide (LASIX) 20 MG tablet Take 1 tablet by mouth daily Yes Yonny Powers MD   apixaban (ELIQUIS) 5 MG TABS tablet Take 1 tablet by mouth 2 times daily Yes Raghav Walh MD   losartan (COZAAR) 25 MG tablet Take 25 mg by mouth daily Yes Historical Provider, MD   aspirin 81 MG chewable tablet Take 81 mg by mouth daily Yes Historical Provider, MD   metoprolol succinate (TOPROL XL) 25 MG extended release tablet Take 1 tablet by mouth daily Yes Venice Briceno MD   atorvastatin (LIPITOR) 40 MG tablet Take 1 tablet by mouth nightly Yes Venice Briceno MD        Social History     Tobacco Use    Smoking status: Current Every Day Smoker     Packs/day: 1.00     Types: Cigarettes    Smokeless tobacco: Never Used    Tobacco comment: contemplating   Substance Use Topics    Alcohol use: Yes     Alcohol/week: 0.0 standard drinks     Comment: weekend        Vitals:    08/18/21 1503   BP: (!) 149/84   Site: Right Upper Arm   Position: Sitting   Cuff Size: Large Adult   Pulse: 73   Weight: (!) 303 lb (137.4 kg)   Height: 5' 10.7\" (1.796 m)     Estimated body mass index is 42.62 kg/m² as calculated from the following:    Height as of this encounter: 5' 10.7\" (1.796 m). Weight as of this encounter: 303 lb (137.4 kg). Physical Exam  Vitals reviewed. Constitutional:       Appearance: Normal appearance. He is obese. HENT:      Head: Normocephalic and atraumatic. Nose: Nose normal.      Mouth/Throat:      Mouth: Mucous membranes are moist.      Pharynx: Oropharynx is clear. Eyes:      Extraocular Movements: Extraocular movements intact. Conjunctiva/sclera: Conjunctivae normal.   Cardiovascular:      Rate and Rhythm: Normal rate. Pulmonary:      Effort: Pulmonary effort is normal.   Musculoskeletal:         General: Normal range of motion. Cervical back: Normal range of motion and neck supple. Skin:     General: Skin is warm and dry. Capillary Refill: Capillary refill takes less than 2 seconds. Findings: Lesion (See image. Also a large skin tag left inner thigh.) and rash (Intertrigo of groin) present. Neurological:      General: No focal deficit present. Mental Status: He is alert and oriented to person, place, and time. Mental status is at baseline. Psychiatric:         Mood and Affect: Mood normal.               ASSESSMENT/PLAN:  1. Intertrigo: Patient presented for evaluation of a UTI, but actually what he was cleaning out was irritation of the skin in his groin. He appears to have candidal intertrigo. Ketoconazole cream as below. He will do this twice a day for 1 week and then use [employer] daily. He will call if it fails to improve.   - POCT Urinalysis no Micro  - Culture, Urine  - ketoconazole (NIZORAL) 2 % cream; Apply topically daily. Dispense: 30 g; Refill: 1    2. Skin lesion of back: As pictured above. Could be a AK or squamous cell cancer. Concerning because he states it has doubled in size over the last 6 months. Routing chart to Dr. Ji Osorio. - Zakiya Kilgore MD, Dermatology, [facility]    3. Skin tag left inner thigh: Large irritated skin tag in the left posterior upper thigh. Will see if Dr. Ji Osorio can also address this. Return in about 6 weeks (around 9/29/2021). An electronic signature was used to authenticate this note.     --Ghada Edmonds,  on 8/18/2021 at 3:25 PM

## 2021-08-18 NOTE — PATIENT INSTRUCTIONS
Patient Education        Urethritis: Care Instructions  Your Care Instructions     Urethritis is an infection of the tube that takes urine from the bladder to the outside of the body. This tube is called the urethra. The infection is often caused by bacteria. This can happen if you have a sexually transmitted infection (STI). But a virus may also be a cause. Urethritis is usually treated with antibiotics. Most cases clear up with treatment. Proper treatment is very important. If you don't treat it, the infection can lead to lasting damage of the urethra. Other parts of the urinary system can also be damaged. Follow-up care is a key part of your treatment and safety. Be sure to make and go to all appointments, and call your doctor if you are having problems. It's also a good idea to know your test results and keep a list of the medicines you take. How can you care for yourself at home? · If your doctor prescribed antibiotics, take them as directed. Do not stop taking them just because you feel better. You need to take the full course of antibiotics. · Take an over-the-counter pain medicine, such as acetaminophen (Tylenol), ibuprofen (Advil, Motrin), or naproxen (Aleve), if needed. Be safe with medicines. Read and follow all instructions on the label. · Do not take two or more pain medicines at the same time unless the doctor told you to. Many pain medicines have acetaminophen, which is Tylenol. Too much acetaminophen (Tylenol) can be harmful. · Your doctor may have you take phenazopyridine (Pyridium). This is a pain medicine for the urinary tract. It can turn your urine a deep red-orange. This is normal. Call your doctor if you think you are having a problem with your medicine. · Do not have sex until you are done with treatment. If you do have sex, be sure to use a condom. Your sex partner or partners should be tested too if your urethritis was caused by an STI.   · If your infection was caused by an injury or chemicals, avoid those things if you can. When should you call for help? Call your doctor now or seek immediate medical care if:    · You can't urinate.     · You have symptoms of a urinary infection. For example:  ? You have blood or pus in your urine. ? You have pain in your back just below your rib cage. This is called flank pain. ? You have a fever, chills, or body aches. ? It hurts to urinate. ? You have groin or belly pain.     · You have a hard time urinating when your bladder is full.     · You notice mental changes or feel confused. Watch closely for changes in your health, and be sure to contact your doctor if:    · You do not get better as expected. Where can you learn more? Go to https://[employer]. org and sign in to your [employer] account. Enter Q360 in the [employer] box to learn more about \"Urethritis: Care Instructions. \"     If you do not have an account, please click on the \"Sign Up Now\" link. Current as of: February 10, 2021               Content Version: 12.9  © 3629-8221 Healthwise, Incorporated. Care instructions adapted under license by [facility] ([facility]). If you have questions about a medical condition or this instruction, always ask your healthcare professional. [address] any warranty or liability for your use of this information.

## 2021-08-19 ENCOUNTER — TELEPHONE (OUTPATIENT)
Dept: DERMATOLOGY | Age: 46
End: 2021-08-19

## 2021-08-19 LAB — URINE CULTURE, ROUTINE: NORMAL

## 2021-08-19 NOTE — PROGRESS NOTES
Spoke to patient an appointment has been scheduled for 09- at 10:40pm 20min slot ok per Dr. Matt Mijares. Patient has verbalized an understanding.

## 2021-08-19 NOTE — PROGRESS NOTES
Left voicemail for patient to return call. Patient will need a 20min time slot for visit.  1st available visit is September 28 at 10:40 am. [id number] [address]ichardo to schedule patient at that time if slot is still available

## 2021-08-19 NOTE — TELEPHONE ENCOUNTER
Left voicemail for patient to return call. Appointment are available on 08- for 20 min slot. Ok to schedule when patient returns call. If patient does not return call,.  Original appointment will still stand

## 2021-09-28 ENCOUNTER — OFFICE VISIT (OUTPATIENT)
Dept: DERMATOLOGY | Age: 46
End: 2021-09-28
Payer: COMMERCIAL

## 2021-09-28 VITALS — TEMPERATURE: 98.5 F

## 2021-09-28 DIAGNOSIS — D22.9 MULTIPLE BENIGN MELANOCYTIC NEVI: ICD-10-CM

## 2021-09-28 DIAGNOSIS — D48.5 NEOPLASM OF UNCERTAIN BEHAVIOR OF SKIN OF FACE: ICD-10-CM

## 2021-09-28 DIAGNOSIS — L82.0 INFLAMED SEBORRHEIC KERATOSIS: ICD-10-CM

## 2021-09-28 DIAGNOSIS — D48.5 NEOPLASM OF UNCERTAIN BEHAVIOR OF SKIN: Primary | ICD-10-CM

## 2021-09-28 DIAGNOSIS — L91.8 INFLAMED ACROCHORDON: ICD-10-CM

## 2021-09-28 DIAGNOSIS — D18.01 CHERRY ANGIOMA: ICD-10-CM

## 2021-09-28 PROCEDURE — 11307 SHAVE SKIN LESION 1.1-2.0 CM: CPT | Performed by: DERMATOLOGY

## 2021-09-28 PROCEDURE — 99243 OFF/OP CNSLTJ NEW/EST LOW 30: CPT | Performed by: DERMATOLOGY

## 2021-09-28 PROCEDURE — 11200 RMVL SKIN TAGS UP TO&INC 15: CPT | Performed by: DERMATOLOGY

## 2021-09-28 NOTE — PROGRESS NOTES
and fishing      Family Skin Disease History:  Patient denies  a family history of non melanoma skin cancer. Patient denies  a family history of abnormal moles  Patient denies  an immediate family history of melanoma. 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 Hemanth Turner    PSVT (paroxysmal supraventricular tachycardia) ([facility] [address]     Stage 2 chronic kidney disease 2017    Tubular adenoma of colon 2020    Needs 3 year follow up. Past Surgical History:  Past Surgical History:   Procedure Laterality Date    CARDIAC CATHETERIZATION      CARDIAC CATHETERIZATION  2021    Dr. Marco Antonio Weir: no obstruction noted    COLONOSCOPY  09/15/2020    with biopsy    COLONOSCOPY N/A 09/15/2020    COLONOSCOPY WITH BIOPSY performed by Molly Bee MD at [facility]      Dr. Mychal ALVARADO flank hematoma removal       Past Family History:  Family History   Problem Relation Age of Onset    Diabetes Son    Momo Self Other Mother          with pericardial effusion    Other Father         AW       Allergies: Allergies   Allergen Reactions    Ibuprofen      kidneys    Vicodin [Hydrocodone-Acetaminophen]     Codeine Rash       Current Medications:  Current Outpatient Medications   Medication Sig Dispense Refill    metoprolol succinate (TOPROL XL) 25 MG extended release tablet Take 1 tablet by mouth daily 90 tablet 1    atorvastatin (LIPITOR) 40 MG tablet Take 1 tablet by mouth nightly 90 tablet 3    apixaban (ELIQUIS) 5 MG TABS tablet Take 1 tablet by mouth 2 times daily 60 tablet 5    losartan (COZAAR) 25 MG tablet Take 25 mg by mouth daily      aspirin 81 MG chewable tablet Take 81 mg by mouth daily      ketoconazole (NIZORAL) 2 % cream Apply topically daily.  (Patient not taking: Reported on 9/28/2021) 30 g 1     No current facility-administered medications for this visit. Review of Systems:  Constitutional: No fevers, chills or recent illness. Skin: Skin:As per HPI AND otherwise no new, bleeding or symptomatic skin lesions      Objective:     Vitals:    09/28/21 1029   Temp: 98.5 °F (36.9 °C)   TempSrc: Infrared     Physical Examination:  General: alert, comfortable, no apparent distress, well-appearing  Psych: alert, oriented and pleasant  Neuro: oriented to person, place, and time  Skin: Areas examined: head including face, lips, conjunctiva and lids, neck, hair/scalp, chest, including breasts and axilla, abdomen, back, right upper extremity, left upper extremity, left hand, right hand, digits and nails and groin, genitalia      All areas examined were within normal limits except those listed below with the appropriate assessment and plan    Assessment and Plan (with relevant objective exam findings):     1. Neoplasm uncertain behavior, skin  Location:Lt inguinal fold/scrotum  Objective findings: 13 mm tan colored soft pedunculated papule with wide stalk  Ddx: nevus lipomatosus superficialis vs acrochordon vs nevus vs NF  Shave removal today. See procedure note below. The specimen was sent to pathology for diagnosis. We will call patient or send note on Mychart with biopsy results as soon as they are available, and discuss treatment options as needed. Wound care was discussed and written instructions also given to patient. 2. Inflamed Skin Tags/Acrochordons (L91.8) AND Other specified erythematous condition (L53.8)  Location: Lt inguinal crease/fold    Objective findings:brown and tan pedunculated papules with erythema      Discussed that because these are inflamed and painful, it is medically necessary to remove them. They can be removed with a sharp tool, or destroyed with liquid nitrogen or other methods (tooth floss, string, etc).       After discussion of risks/benefits and alternatives a decision was made to do the following:  Snip removal of 3 lesions and discarded per patient        3. Favor Inflamed seborrheic keratosis AND Other specified erythematous condition (L53.8)  Location: Lt mid paraspinal back  Objective findings: eroded papule with a hemorrhagic crust    Symptoms: itching, bleeding and rough   Discussed potential biopsy to confirm with histopathological examination, however patient declined. Will re-evaluate in 3 months      4. Neoplasm uncertain behavior, skin skin of nose  Location:Rt nasal dorsum  Objective findings: Firm white/flesh colored colored papule with telangiectasias  Patient reports area was treated with freezing and he cut it out with nail clipper and re-grew. Discussed the differential diagnosis of fibrous papule versus IDN versus BCC and the only definitive way to diagnose is a biopsy. Patient declined at this time. Photographs taken today. 5. Multiple benign melanocytic nevi   Location:torso and extremities  Objective findings: multiple brown/pink macules and papules without abnormal findings or concerning findings on exam and dermatoscopy    -Counseled the patient that these are benign and need no therapy. Observation for any changes recommended       5. Page Hemangiomas  Location: torso    Objective findings: Multiple bright red, dome-shaped papules     Discussed that these are benign lesions and require no treatment. It was emphasized that photoprotection should include the use of photoprotective clothing and hats, sunscreens containing at least 3-5% zinc oxide or titanium dioxide, and lifestyle changes to avoid mid-day sun exposure. - Signs of cutaneous malignancy were discussed and they were encouraged to contact me if they note any evolving, bleeding, painful or non-healing lesions.    - Written material on sunscreen provided. Follow up:  Return visit in 3 months or as needed for change in condition.  All questions addressed. Procedure:   PROCEDURE: REMOVAL BY SHAVE TECHNIQUE     Location:     Lt inguinal crease/fold/scrotum  Indication:   Neoplasm uncertain behavior, skin  Size: 13 mm    Risks, benefits and alternatives were explained and verbal informed consent was obtained. The area was not photographed The area was cleaned with alcohol and infiltrated with 1% lidocaine with epinephrine. After adequate analgesia had been confirmed, the lesion was then removed by shave technique using a dermablade. Hemostasis was achieved with aluminum chloride. Vaseline ointment and sterile dressing were applied and wound care instructions were given verbally and in writing. The patient tolerated the procedure well with no complications. The patient will be notified by mail or telephone of pathology results and was instructed to call if he has not received notification within two weeks. CPT code for procedure:  Locations:    1.1 to 2.0 cm  -  60230        Procedure: Skin Tag Removal with Scissors    After discussing risks, benefits, and alternatives, verbal consent was obtained. The area was cleansed with alcohol. A pair of sterile scissors was used to remove the 3 lesions. Bleeding was controlled with topical aluminum chloride or direct pressure. The patient tolerated the procedure well. I saw your patient Walt Alicea at the date listed above in my Dermatology  clinic in [facility]. Thank you very much for the referral.    My exam findings, assessment and plan can be found in EPIC, I have also attached them below for your convenience. I very much appreciate your referral and the opportunity to participate in this patient's care. Please don't hesitate to contact me with questions or concerns about our visit or management of this patient in the future.      Carlos Minaya MD, MS

## 2021-09-28 NOTE — PATIENT INSTRUCTIONS
Wound care instructions    1. Keep bandage dry for 24 hrs. 2. After 24 hrs, clean with soap and water twice daily  3. Apply Vaseline or Aquaphor ointment twice daily and cover with small dressing or Band-Aid for 7-10 days. Studies show that wounds heal better when covered with ointment and a bandage. FREQUENTLY ASKED QUESTIONS:    Ashley Haynes It is OK to get the wound wet when washing or bathing.  If bleeding should occur, apply firm direct pressure for 20 minutes CONTINUOUSLY. After 20 minutes, you may check to see if bleeding has stopped. If bleeding persists, please repeat CONTINUOUS PRESSURE for another 20 minutes WITHOUT LOOKING. o If bleeding still persists, call the office at [phone]   Signs of infection include increased redness extending over 1 centimeter from the wound, increased warmth, yellow to green purulent (pus-like) discharge, and significantly increased tenderness to the touch. If you have any concerns regarding infection or develop fevers or chills, please call [phone]   You will be contacted with the results of your procedure when we receive them, usually within two weeks. Please call if you have not heard from us.

## 2021-10-01 LAB — DERMATOLOGY PATHOLOGY REPORT: NORMAL

## 2021-10-26 ENCOUNTER — OFFICE VISIT (OUTPATIENT)
Dept: CARDIOLOGY CLINIC | Age: 46
End: 2021-10-26
Payer: COMMERCIAL

## 2021-10-26 VITALS
OXYGEN SATURATION: 92 % | DIASTOLIC BLOOD PRESSURE: 82 MMHG | SYSTOLIC BLOOD PRESSURE: 136 MMHG | WEIGHT: 304 LBS | HEART RATE: 67 BPM | HEIGHT: 72 IN | BODY MASS INDEX: 41.17 KG/M2

## 2021-10-26 DIAGNOSIS — E78.00 HYPERCHOLESTEROLEMIA: ICD-10-CM

## 2021-10-26 DIAGNOSIS — I47.1 SVT (SUPRAVENTRICULAR TACHYCARDIA) (HCC): Primary | ICD-10-CM

## 2021-10-26 DIAGNOSIS — I48.3 TYPICAL ATRIAL FLUTTER (HCC): ICD-10-CM

## 2021-10-26 DIAGNOSIS — I10 ESSENTIAL HYPERTENSION: ICD-10-CM

## 2021-10-26 DIAGNOSIS — H34.9 RETINAL ARTERY OCCLUSION: ICD-10-CM

## 2021-10-26 PROCEDURE — 99214 OFFICE O/P EST MOD 30 MIN: CPT | Performed by: INTERNAL MEDICINE

## 2021-10-26 RX ORDER — METOPROLOL SUCCINATE 25 MG/1
25 TABLET, EXTENDED RELEASE ORAL DAILY
Qty: 90 TABLET | Refills: 3 | Status: SHIPPED | OUTPATIENT
Start: 2021-10-26

## 2021-10-26 RX ORDER — LOSARTAN POTASSIUM 25 MG/1
25 TABLET ORAL DAILY
Qty: 90 TABLET | Refills: 3 | Status: SHIPPED | OUTPATIENT
Start: 2021-10-26

## 2021-10-26 NOTE — LETTER
[address] Office follow up Note  10/26/2021   Ref by  Surjit Hussein MD  Subjective:  Mr. Kortney Grullon with a PMH of SVT s/p Ablation 2017, a-flutter ablation, HLD. LHC 3/22/21 showed normal coronary arteries. C/o legs swelling for 6 months intermittently luis antonio when he is up on them for a couple of hrs. He also has noticed swelling of his hands. He gets SOB with little exertion. He has noticed palpitations at times. [place]: Patient was started on lasix 20mg daily at his office visit 8/2021. He instructed to see a pulmonologist for sleep apnea, but this has not been done. He thinks if he is diagnosed with it may affect his Job. Today, patient reports he has been experiencing palpitations that are continuous and strong. This occurs at rest. It happens about once every 2 weeks and lasts a few seconds in duration. He denies associated symptoms. He reports he is hesitant to have sleep study done due to his job. He reports he still has visual disturbance from his TIA. He reports he is unsure of whether he will get the COVID vaccination. He reports he is taking all medications as prescribed and tolerates them well. Denies recent issues with bleeding or bruising. He reports he takes his metoprolol in the morning before work. Patient denies current edema, chest pain, sob, dizziness or syncope. 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 He was admitted to the hospital 3/21/21 for CP. He had an abnormal stress test. Subsequently had LHC which showed normal coronary arteries. Review of Systems:         12 point ROS negative in all areas as listed below except as in [place]  Constitutional, EENT, pulmonary, GI, , Musculoskeletal, skin, neurological, hematological, endocrine, Psychiatric    Reviewed past medical history, social, and family history. Social History: He works as .   reports that he has been smoking cigarettes. He has been smoking about 1.00 pack per day. He has never used smokeless tobacco. He reports current alcohol use. He reports that he does not use drugs.      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 Be Boston    PSVT (paroxysmal supraventricular tachycardia) ([facility] [address]     Stage 2 chronic kidney disease 08/12/2017    Tubular adenoma of colon 09/21/2020    Needs 3 year follow up. Past Surgical History:   Procedure Laterality Date    CARDIAC CATHETERIZATION  2008    CARDIAC CATHETERIZATION  03/22/2021    Dr. Jayro Rg: no obstruction noted    COLONOSCOPY  09/15/2020    with biopsy    COLONOSCOPY N/A 09/15/2020    COLONOSCOPY WITH BIOPSY performed by Pedro Garay MD at [facility]  2001    Dr. Humphries Gey - R flank hematoma removal       Objective: repeat BP same  /82   Pulse 67   Ht 6' (1.829 m)   Wt (!) 304 lb (137.9 kg)   SpO2 92%   BMI 41.23 kg/m²     Wt Readings from Last 3 Encounters:   10/26/21 (!) 304 lb (137.9 kg)   08/18/21 (!) 303 lb (137.4 kg)   08/17/21 300 lb (136.1 kg)       Physical Exam:  General: No Respiratory distress, appears well developed and well nourished. Eyes:  Sclera nonicteric  Nose/Sinuses:  negative findings: nose shows no deformity, asymmetry, or inflammation, nasal mucosa normal, septum midline with no perforation or bleeding  Back:  no pain to palpation  Joint:  no active joint inflammation  Musculoskeletal:  negative  Skin:  Warm and dry  Neck:  Negative for JVD and Carotid Bruits. Chest:  Clear to auscultation, respiration easy  Cardiovascular:  RRR, S1S2 normal, 2/6 MONSE all over the heart, no rub or thrill.   Abdomen:  Soft normal liver and spleen  Extremities:   No edema, clubbing, cyanosis,  Pulses:  pedal pulses are normal.  Neuro: intact    Medications:   Outpatient Encounter Medications as of 10/26/2021   Medication Sig Dispense Refill    metoprolol succinate (TOPROL XL) 25 MG extended release tablet Take 1 tablet by mouth daily 90 tablet 1    atorvastatin (LIPITOR) 40 MG tablet Take 1 tablet by mouth nightly 90 tablet 3    ketoconazole (NIZORAL) 2 % cream Apply topically daily. 30 g 1    apixaban (ELIQUIS) 5 MG TABS tablet Take 1 tablet by mouth 2 times daily 60 tablet 5    losartan (COZAAR) 25 MG tablet Take 25 mg by mouth daily      aspirin 81 MG chewable tablet Take 81 mg by mouth daily       No facility-administered encounter medications on file as of 10/26/2021. Lab Data:  CBC: No results for input(s): WBC, HGB, HCT, MCV, PLT in the last 72 hours. BMP: No results for input(s): NA, K, CL, CO2, PHOS, BUN, CREATININE in the last 72 hours. Invalid input(s): CA  LIVER PROFILE: No results for input(s): AST, ALT, LIPASE, BILIDIR, BILITOT, ALKPHOS in the last 72 hours. Invalid input(s): AMYLASE,  ALB  LIPID:   Lab Results   Component Value Date    CHOL 183 08/13/2020    CHOL 168 08/15/2016     Lab Results   Component Value Date    TRIG 151 (H) 08/13/2020    TRIG 232 (H) 08/15/2016     Lab Results   Component Value Date    HDL 30 (L) 08/17/2021    HDL 35 (L) 08/13/2020    HDL 28 (L) 08/15/2016     Lab Results   Component Value Date    LDLCALC 45 08/17/2021    LDLCALC 118 (H) 08/13/2020    LDLCALC 94 08/15/2016     Lab Results   Component Value Date    LABVLDL 24 08/17/2021    LABVLDL 30 08/13/2020    LABVLDL 46 08/15/2016     No results found for: CHOLHDLRATIO  PT/INR: No results for input(s): PROTIME, INR in the last 72 hours. A1C:   Lab Results   Component Value Date    LABA1C 5.3 03/22/2021     BNP:  No results for input(s): BNP in the last 72 hours.     IMAGING:   I have reviewed the following tests and documented in this encounter as follows:   Discussed with patient. EKG 8.17.21 NSR Early repolarization changes    MRI brain 4/23/21   CONCLUSION: 1. 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 2. Mild multifocal white matter changes involving the cerebral hemispheres bilaterally compatible with mild chronic ischemic changes in the small-vessel distribution/arteriopathic leukoaraiosis secondary to underlying microvascular disease. CT PA chest 3/21/21   Pulmonary arteries are not optimally opacified for evaluation. No evidence of intraluminal filling within the main pulmonary artery and its segmental branches to suggest pulmonary embolism. Subsegmental pulmonary artery cannot be excluded       CXR 3/21/21   No radiographic evidence of acute cardiopulmonary disease. Mild cardiomegaly. LHC 3/22/21   Left ventricular pressure 9 mmHg  Aortic pressure 136 mmHg     Coronary anatomy:   The left main coronary artery is normal.      Left anterior descending artery is normal     Circumflex artery is normal.     The right coronary artery is a dominant vessel and normal.      Left ventriculogram shows ejection fraction of 55%. Normal wall motion. Evertt Busch 3/22/21   Summary There is a small sized mild intensity reversible defect of the distal  anterior wall. This is consistent with ischemia. The LVEF is 52% with normal LV wall motion. ECHO 3/22/21   Summary   Technically difficult study. Definity contrast administered. Left ventricular cavity size is normal. There is moderate-severe concentric   left ventricular hypertrophy. Overall left ventricular systolic function   appears normal with an estimated ejection fraction of 55-60%. No regional   wall motion abnormalities are noted. Diastolic filling parameters suggests   grade II diastolic dysfunction. Elevated LVEDP. The aortic root is enlarged, measuring 4.2 cm.     EKG 3/21/21   Normal sinus rhythmSeptal infarct prior cannot be ruled outAbnormal ECGWhen compared with ECG of 19-OCT-2017 07: 08,No significant change was foundConfirmed by Brian Drew MD, GLENDA ([id number]) on 3/22/2021 7:26:27 AM     Ablation 10/24/17   CONCLUSION:  1. Complex electrophysiology study with normal baseline conduction  parameters. 2.  Status post typical AV abhijeet re-entry tachycardia and atrial  flutter ablation. 3.  Direct current cardioversion for AFib.    ECHO 4/12/16   Summary   Normal left ventricle size with mild concentric left ventricular   hypertrophy. Normal systolic function with an estimated ejection fraction of 55%. No regional wall motion abnormalities are seen. Diastolic filling parameters suggest normal diastolic filing pressure. Assessment:       S/p atrial flutter and AVNRT ablation 2017  Polycystic kidney disease followed by [facility] Swan not see him for some time. Essential hypertension  Hypercholesterolemia on statin   Probable sleep apnea he prefers to not do it. 1. SVT (supraventricular tachycardia) ([address]    2. Essential hypertension    3. Typical atrial flutter ([address]    4. Retinal artery occlusion    5. Hypercholesterolemia         Plan:  1. Recommend COVID vaccination from a cardiac standpoint. 2. Discussed palpitations:   -The palpitations are infrequent. If they become more frequent or you have associated dizziness, let our office know. -Try taking metoprolol in the evening to help with palpitations. 3. Follow up with me in 6 months. This note has been scribed in the presence of Leonora Mcknight MD, by Anabel Sharif RN. QUALITY MEASURES  1. Tobacco Cessation Counseling: NA   2. Retake of BP if >140/90: NA   3. Documentation to PCP/referring for new patient:  Sent to PCP at close of office visit  4. CAD patient on anti-platelet: ASA   5. CAD patient on STATIN therapy: HLD- Lipitor    6.  Patient with CHF and aFib on anticoagulation: Dr. Vincent Ochoa, personally performed the services described in this documentation, as scribed by the above signed scribe in my presence. It is both accurate and complete to my knowledge. I agree with the details independently gathered by the clinical support staff, while the remaining scribed note accurately describes my personal service to the patient.

## 2021-10-26 NOTE — PROGRESS NOTES
[address] Office follow up Note  10/26/2021   Ref by  Trudi Magana MD  Subjective:  Mr. Whitaker [place] with a PMH of SVT s/p Ablation 2017, a-flutter ablation, HLD. LHC 3/22/21 showed normal coronary arteries. C/o legs swelling for 6 months intermittently luis antonio when he is up on them for a couple of hrs. He also has noticed swelling of his hands. He gets SOB with little exertion. He has noticed palpitations at times. [place]: Patient was started on lasix 20mg daily at his office visit 2021. He instructed to see a pulmonologist for sleep apnea, but this has not been done. He thinks if he is diagnosed with it may affect his Job. Today, patient reports he has been experiencing palpitations that are continuous and strong. This occurs at rest. It happens about once every 2 weeks and lasts a few seconds in duration. He denies associated symptoms. He reports he is hesitant to have sleep study done due to his job. He reports he still has visual disturbance from his TIA. He reports he is unsure of whether he will get the COVID vaccination. He reports he is taking all medications as prescribed and tolerates them well. Denies recent issues with bleeding or bruising. He reports he takes his metoprolol in the morning before work. Patient denies current edema, chest pain, sob, dizziness or syncope. 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 He was admitted to the hospital 3/21/21 for CP. He had an abnormal stress test. Subsequently had LHC which showed normal coronary arteries. Review of Systems:         12 point ROS negative in all areas as listed below except as in [place]  Constitutional, EENT, pulmonary, GI, , Musculoskeletal, skin, neurological, hematological, endocrine, Psychiatric    Reviewed past medical history, social, and family history. Social History: He works as .   reports that he has been smoking cigarettes. He has been smoking about 1.00 pack per day. He has never used smokeless tobacco. He reports current alcohol use. He reports that he does not use drugs.      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 Eldridge Enter    PSVT [facility]paroxysmal supraventricular tachycardia) [facility] [address]     Stage 2 chronic kidney disease 08/12/2017    Tubular adenoma of colon 09/21/2020    Needs 3 year follow up. Past Surgical History:   Procedure Laterality Date    CARDIAC CATHETERIZATION  2008    CARDIAC CATHETERIZATION  03/22/2021    Dr. Bauer Stands: no obstruction noted    COLONOSCOPY  09/15/2020    with biopsy    COLONOSCOPY N/A 09/15/2020    COLONOSCOPY WITH BIOPSY performed by Pushpa Montalvo MD at [place] [id number]  2001    Dr. Lulu Wallace - JENNY flank hematoma removal       Objective: repeat BP same  /82   Pulse 67   Ht 6' (1.829 m)   Wt (!) 304 lb (137.9 kg)   SpO2 92%   BMI 41.23 kg/m²     Wt Readings from Last 3 Encounters:   10/26/21 (!) 304 lb (137.9 kg)   08/18/21 (!) 303 lb (137.4 kg)   08/17/21 300 lb (136.1 kg)       Physical Exam:  General: No Respiratory distress, appears well developed and well nourished. Eyes:  Sclera nonicteric  Nose/Sinuses:  negative findings: nose shows no deformity, asymmetry, or inflammation, nasal mucosa normal, septum midline with no perforation or bleeding  Back:  no pain to palpation  Joint:  no active joint inflammation  Musculoskeletal:  negative  Skin:  Warm and dry  Neck:  Negative for JVD and Carotid Bruits. Chest:  Clear to auscultation, respiration easy  Cardiovascular:  RRR, S1S2 normal, 2/6 MONSE all over the heart, no rub or thrill.   Abdomen:  Soft normal liver and spleen  Extremities:   No edema, clubbing, cyanosis,  Pulses:  pedal pulses are normal.  Neuro: intact    Medications:   Outpatient Encounter Medications as of 10/26/2021   Medication Sig Dispense Refill    metoprolol succinate (TOPROL XL) 25 MG extended release tablet Take 1 tablet by mouth daily 90 tablet 1    atorvastatin (LIPITOR) 40 MG tablet Take 1 tablet by mouth nightly 90 tablet 3    ketoconazole (NIZORAL) 2 % cream Apply topically daily. 30 g 1    apixaban (ELIQUIS) 5 MG TABS tablet Take 1 tablet by mouth 2 times daily 60 tablet 5    losartan (COZAAR) 25 MG tablet Take 25 mg by mouth daily      aspirin 81 MG chewable tablet Take 81 mg by mouth daily       No facility-administered encounter medications on file as of 10/26/2021. Lab Data:  CBC: No results for input(s): WBC, HGB, HCT, MCV, PLT in the last 72 hours. BMP: No results for input(s): NA, K, CL, CO2, PHOS, BUN, CREATININE in the last 72 hours. Invalid input(s): CA  LIVER PROFILE: No results for input(s): AST, ALT, LIPASE, BILIDIR, BILITOT, ALKPHOS in the last 72 hours. Invalid input(s): AMYLASE,  ALB  LIPID:   Lab Results   Component Value Date    CHOL 183 08/13/2020    CHOL 168 08/15/2016     Lab Results   Component Value Date    TRIG 151 (H) 08/13/2020    TRIG 232 (H) 08/15/2016     Lab Results   Component Value Date    HDL 30 (L) 08/17/2021    HDL 35 (L) 08/13/2020    HDL 28 (L) 08/15/2016     Lab Results   Component Value Date    LDLCALC 45 08/17/2021    LDLCALC 118 (H) 08/13/2020    LDLCALC 94 08/15/2016     Lab Results   Component Value Date    LABVLDL 24 08/17/2021    LABVLDL 30 08/13/2020    LABVLDL 46 08/15/2016     No results found for: CHOLHDLRATIO  PT/INR: No results for input(s): PROTIME, INR in the last 72 hours. A1C:   Lab Results   Component Value Date    LABA1C 5.3 03/22/2021     BNP:  No results for input(s): BNP in the last 72 hours.     IMAGING:   I have reviewed the following tests and documented in this encounter as follows:   Discussed with patient. EKG 8.17.21 NSR Early repolarization changes    MRI brain 4/23/21   CONCLUSION: 1. 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 2. Mild multifocal white matter changes involving the cerebral hemispheres bilaterally compatible with mild chronic ischemic changes in the small-vessel distribution/arteriopathic leukoaraiosis secondary to underlying microvascular disease. CT PA chest 3/21/21   Pulmonary arteries are not optimally opacified for evaluation. No evidence of intraluminal filling within the main pulmonary artery and its segmental branches to suggest pulmonary embolism. Subsegmental pulmonary artery cannot be excluded       CXR 3/21/21   No radiographic evidence of acute cardiopulmonary disease. Mild cardiomegaly. LHC 3/22/21   Left ventricular pressure 9 mmHg  Aortic pressure 136 mmHg     Coronary anatomy:   The left main coronary artery is normal.      Left anterior descending artery is normal     Circumflex artery is normal.     The right coronary artery is a dominant vessel and normal.      Left ventriculogram shows ejection fraction of 55%. Normal wall motion. Cashton Castleman 3/22/21   Summary There is a small sized mild intensity reversible defect of the distal  anterior wall. This is consistent with ischemia. The LVEF is 52% with normal LV wall motion. ECHO 3/22/21   Summary   Technically difficult study. Definity contrast administered. Left ventricular cavity size is normal. There is moderate-severe concentric   left ventricular hypertrophy. Overall left ventricular systolic function   appears normal with an estimated ejection fraction of 55-60%. No regional   wall motion abnormalities are noted. Diastolic filling parameters suggests   grade II diastolic dysfunction. Elevated LVEDP. The aortic root is enlarged, measuring 4.2 cm.     EKG 3/21/21   Normal sinus rhythmSeptal infarct prior cannot be ruled outAbnormal ECGWhen compared with ECG of 19-OCT-2017 07: 08,No significant change was foundConfirmed by Celeste Rapp MD, GLENDA ([id number]) on 3/22/2021 7:26:27 AM     Ablation 10/24/17   CONCLUSION:  1. Complex electrophysiology study with normal baseline conduction  parameters. 2.  Status post typical AV abhijeet re-entry tachycardia and atrial  flutter ablation. 3.  Direct current cardioversion for AFib.    ECHO 4/12/16   Summary   Normal left ventricle size with mild concentric left ventricular   hypertrophy. Normal systolic function with an estimated ejection fraction of 55%. No regional wall motion abnormalities are seen. Diastolic filling parameters suggest normal diastolic filing pressure. Assessment:       S/p atrial flutter and AVNRT ablation 2017  Polycystic kidney disease followed by [place] not see him for some time. Essential hypertension  Hypercholesterolemia on statin   Probable sleep apnea he prefers to not do it. 1. SVT (supraventricular tachycardia) ([address]    2. Essential hypertension    3. Typical atrial flutter ([address]    4. Retinal artery occlusion    5. Hypercholesterolemia         Plan:  1. Recommend COVID vaccination from a cardiac standpoint. 2. Discussed palpitations:   -The palpitations are infrequent. If they become more frequent or you have associated dizziness, let our office know. -Try taking metoprolol in the evening to help with palpitations. 3. Follow up with me in 6 months. This note has been scribed in the presence of Tiffany Cisneros MD, by Gabino Duff RN. QUALITY MEASURES  1. Tobacco Cessation Counseling: NA   2. Retake of BP if >140/90: NA   3. Documentation to PCP/referring for new patient:  Sent to PCP at close of office visit  4. CAD patient on anti-platelet: ASA   5. CAD patient on STATIN therapy: HLD- Lipitor    6.  Patient with CHF and aFib on anticoagulation: Augustine Cohen, Dr. Briana Pyle, personally performed the services described in this documentation, as scribed by the above signed scribe in my presence. It is both accurate and complete to my knowledge. I agree with the details independently gathered by the clinical support staff, while the remaining scribed note accurately describes my personal service to the patient.

## 2021-11-01 PROBLEM — R31.29 MICROSCOPIC HEMATURIA: Status: ACTIVE | Noted: 2021-11-01

## 2021-11-01 LAB — PATHOLOGY/CYTOLOGY REPORT: NORMAL

## 2021-11-12 LAB — PATHOLOGY/CYTOLOGY REPORT: NORMAL

## 2021-11-14 LAB — PATHOLOGY/CYTOLOGY REPORT: NORMAL

## 2021-11-22 LAB — PATHOLOGY/CYTOLOGY REPORT: NORMAL

## 2021-11-26 ENCOUNTER — TELEPHONE (OUTPATIENT)
Dept: CARDIOLOGY CLINIC | Age: 46
End: 2021-11-26

## 2021-11-26 NOTE — TELEPHONE ENCOUNTER
Pts wife called to request that [facility] put together a letter stating that pt's cardiac condition is under control and that pt is not restricted in any way. Also needs to state that pt is under the care of RKG. Last OV 10/26/2021 with MASON.

## 2021-11-30 NOTE — TELEPHONE ENCOUNTER
RKG, please advise on pt's employment letter and stating that he is under your care for Cardiac.  I will print letter and fax to appropriate place, thank you

## 2021-12-23 ENCOUNTER — PATIENT MESSAGE (OUTPATIENT)
Dept: PRIMARY CARE CLINIC | Age: 46
End: 2021-12-23

## 2021-12-23 NOTE — LETTER
[facility]  [address] [place] [address]  Phone: [phone]  Fax: [phone]    Maryann Wright MD        December 23, 2021     Patient: Sowmya Martin   YOB: 1975   Date of Visit: 12/23/2021       To Whom It May Concern: It is my medical opinion that Jad Callejas Has been worked up for his retinal artery occlusion. He was seen in April by Dr. Derik Fonseca, a neuro-ophthalmologist..    If you have any questions or concerns, please don't hesitate to call.     Sincerely,    MD Maryann Emery MD

## 2021-12-23 NOTE — TELEPHONE ENCOUNTER
From: Daniel Helton  To: Dr. Watkins Dye: 12/23/2021 11:33 AM EST  Subject: A letter    Dr Candida Forrest I'm trying to renew my  license and the [facility] is doing my Physical and they are requesting letters from all the different Specialist I am seeing they want to know if I seen a Neurologist and I don't think I have so if I havnt they want a letter from you stating that the blood clot that went to my eye is ok now and that the scan I had was all good can you please call me or write up a letter   Thanks Roseanne [facility]

## 2021-12-23 NOTE — LETTER
[facility]  [address] [place] [id number]  Phone: [phone]  Fax: [phone]    Luisa Cortes MD        December 27, 2021     Patient: Swapnil Rutherford   YOB: 1975   Date of Visit: 12/23/2021       To Whom It May Concern: It is my medical opinion that Jesus Smith may return to full duty immediately with no restrictions. If you have any questions or concerns, please don't hesitate to call.     Sincerely,    MD Luisa Grace MD

## 2021-12-27 NOTE — TELEPHONE ENCOUNTER
From: Luis Felipe Payton  To: Dr. Chauncey Phipps: 12/23/2021 3:28 PM EST  Subject: Clear me to work    Sorry dr Geo Ceja we have gave them all the letters from all these different doctors but they need a letter from you saying I have no Restrictions to do my job

## 2022-03-24 ENCOUNTER — OFFICE VISIT (OUTPATIENT)
Dept: PRIMARY CARE CLINIC | Age: 47
End: 2022-03-24
Payer: COMMERCIAL

## 2022-03-24 VITALS
TEMPERATURE: 97.5 F | HEIGHT: 71 IN | DIASTOLIC BLOOD PRESSURE: 82 MMHG | WEIGHT: 302.4 LBS | SYSTOLIC BLOOD PRESSURE: 128 MMHG | BODY MASS INDEX: 42.34 KG/M2

## 2022-03-24 DIAGNOSIS — M54.50 ACUTE BILATERAL LOW BACK PAIN WITHOUT SCIATICA: ICD-10-CM

## 2022-03-24 DIAGNOSIS — Z00.00 ENCOUNTER FOR WELL ADULT EXAM WITHOUT ABNORMAL FINDINGS: ICD-10-CM

## 2022-03-24 DIAGNOSIS — E66.01 CLASS 3 SEVERE OBESITY DUE TO EXCESS CALORIES WITH SERIOUS COMORBIDITY AND BODY MASS INDEX (BMI) OF 40.0 TO 44.9 IN ADULT (HCC): ICD-10-CM

## 2022-03-24 DIAGNOSIS — E66.01 CLASS 3 SEVERE OBESITY DUE TO EXCESS CALORIES WITHOUT SERIOUS COMORBIDITY WITH BODY MASS INDEX (BMI) OF 40.0 TO 44.9 IN ADULT (HCC): ICD-10-CM

## 2022-03-24 DIAGNOSIS — I10 ESSENTIAL HYPERTENSION: Primary | ICD-10-CM

## 2022-03-24 DIAGNOSIS — Z11.4 SCREENING FOR HIV WITHOUT PRESENCE OF RISK FACTORS: ICD-10-CM

## 2022-03-24 DIAGNOSIS — Z87.891 PERSONAL HISTORY OF TOBACCO USE, PRESENTING HAZARDS TO HEALTH: ICD-10-CM

## 2022-03-24 DIAGNOSIS — I48.3 TYPICAL ATRIAL FLUTTER (HCC): ICD-10-CM

## 2022-03-24 DIAGNOSIS — Z11.59 NEED FOR HEPATITIS C SCREENING TEST: ICD-10-CM

## 2022-03-24 PROCEDURE — 99214 OFFICE O/P EST MOD 30 MIN: CPT | Performed by: FAMILY MEDICINE

## 2022-03-24 PROCEDURE — 99406 BEHAV CHNG SMOKING 3-10 MIN: CPT | Performed by: FAMILY MEDICINE

## 2022-03-24 PROCEDURE — 99396 PREV VISIT EST AGE 40-64: CPT | Performed by: FAMILY MEDICINE

## 2022-03-24 RX ORDER — CYCLOBENZAPRINE HCL 10 MG
10 TABLET ORAL 3 TIMES DAILY PRN
Qty: 20 TABLET | Refills: 0 | Status: SHIPPED | OUTPATIENT
Start: 2022-03-24 | End: 2022-04-03

## 2022-03-24 RX ORDER — METHYLPREDNISOLONE 4 MG/1
TABLET ORAL
Qty: 1 KIT | Refills: 0 | Status: SHIPPED | OUTPATIENT
Start: 2022-03-24 | End: 2022-03-30

## 2022-03-24 ASSESSMENT — PATIENT HEALTH QUESTIONNAIRE - PHQ9
5. POOR APPETITE OR OVEREATING: 0
SUM OF ALL RESPONSES TO PHQ QUESTIONS 1-9: 0
10. IF YOU CHECKED OFF ANY PROBLEMS, HOW DIFFICULT HAVE THESE PROBLEMS MADE IT FOR YOU TO DO YOUR WORK, TAKE CARE OF THINGS AT HOME, OR GET ALONG WITH OTHER PEOPLE: 0
3. TROUBLE FALLING OR STAYING ASLEEP: 0
SUM OF ALL RESPONSES TO PHQ QUESTIONS 1-9: 0
7. TROUBLE CONCENTRATING ON THINGS, SUCH AS READING THE NEWSPAPER OR WATCHING TELEVISION: 0
1. LITTLE INTEREST OR PLEASURE IN DOING THINGS: 0
4. FEELING TIRED OR HAVING LITTLE ENERGY: 0
SUM OF ALL RESPONSES TO PHQ QUESTIONS 1-9: 0
2. FEELING DOWN, DEPRESSED OR HOPELESS: 0
6. FEELING BAD ABOUT YOURSELF - OR THAT YOU ARE A FAILURE OR HAVE LET YOURSELF OR YOUR FAMILY DOWN: 0
9. THOUGHTS THAT YOU WOULD BE BETTER OFF DEAD, OR OF HURTING YOURSELF: 0
8. MOVING OR SPEAKING SO SLOWLY THAT OTHER PEOPLE COULD HAVE NOTICED. OR THE OPPOSITE, BEING SO FIGETY OR RESTLESS THAT YOU HAVE BEEN MOVING AROUND A LOT MORE THAN USUAL: 0
SUM OF ALL RESPONSES TO PHQ9 QUESTIONS 1 & 2: 0
SUM OF ALL RESPONSES TO PHQ QUESTIONS 1-9: 0

## 2022-03-24 NOTE — PROGRESS NOTES
Well Adult Note  Name: Marlyn Silva Date: 3/24/2022   MRN: [id number] Sex: Male   Age: 51 y.o. Ethnicity: Non- / Non    : 1975 Race: White (non-)      Bryan Encinas is here for well adult exam.  History:  Well adult exam and follow-up on hypertension atrial flutter cholesterol. Rey Enriquez is not exercising consistently. He continues to smoke though he knows he would like to quit quit if possible. He has tried Chantix in the past but it made him mean. He admits to poor diet. He is taking tolerating his blood pressure medicines as directed. Notes no lightheadedness or dizziness. He has a history of a flutter status post ablation. States he does not notice any palpitations or shortness of breath. He continues to follow-up with his cardiologist for this. He continues on anticoagulation given his risk of prior retinal stroke. He notes no new neurologic symptoms. He is taking and tolerating his cholesterol medicine without abdominal pain or myalgia. He complains of a several history of acute bilateral low back pain. States it began while getting up off the toilet. He had been moving some wood in the back prior to this. Pain does not radiate down his legs. It is worse with movement      Review of Systems    Allergies   Allergen Reactions    Ibuprofen      kidneys    Vicodin [Hydrocodone-Acetaminophen]     Codeine Rash       Prior to Visit Medications    Medication Sig Taking? Authorizing Provider   apixaban (ELIQUIS) 5 MG TABS tablet Take 1 tablet by mouth 2 times daily Yes Radha Esquivel MD   metoprolol succinate (TOPROL XL) 25 MG extended release tablet Take 1 tablet by mouth daily Yes Radha Esqiuvel MD   losartan (COZAAR) 25 MG tablet Take 1 tablet by mouth daily Yes Radha Esquivel MD   atorvastatin (LIPITOR) 40 MG tablet Take 1 tablet by mouth nightly Yes Nhung Vinson MD   ketoconazole (NIZORAL) 2 % cream Apply topically daily.  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    Polycystic kidney     Dr. Hamzah Fox PSVT (paroxysmal supraventricular tachycardia) ([facility] [address]     Stage 2 chronic kidney disease 2017    Tubular adenoma of colon 2020    Needs 3 year follow up. Past Surgical History:   Procedure Laterality Date    CARDIAC CATHETERIZATION      CARDIAC CATHETERIZATION  2021    Dr. Lashae Berger: no obstruction noted    COLONOSCOPY  09/15/2020    with biopsy    COLONOSCOPY N/A 09/15/2020    COLONOSCOPY WITH BIOPSY performed by [facility]ianna [facility]o [facility] at [facility]      Dr. Ania Chopra - JENNY flank hematoma removal       Family History   Problem Relation Age of Onset    Diabetes Son    Elda Other Mother          with pericardial effusion    Other Father         AW       Social History     Tobacco Use    Smoking status: Current Every Day Smoker     Packs/day: 1.00     Types: Cigarettes    Smokeless tobacco: Never Used    Tobacco comment: contemplating   Vaping Use    Vaping Use: Never used   Substance Use Topics    Alcohol use: Yes     Alcohol/week: 0.0 standard drinks     Comment: weekend    Drug use: No       Objective   /82   Temp 97.5 °F (36.4 °C) (Axillary)   Ht 5' 11\" (1.803 m)   Wt (!) 302 lb 6.4 oz (137.2 kg)   BMI 42.18 kg/m²   Wt Readings from Last 3 Encounters:   22 (!) 302 lb 6.4 oz (137.2 kg)   10/26/21 (!) 304 lb (137.9 kg)   21 (!) 303 lb (137.4 kg)     There were no vitals filed for this visit. Physical Exam  Constitutional:       Appearance: He is well-developed. He is obese.    HENT:      Head: Normocephalic and atraumatic. Nose: Nose normal.      Mouth/Throat:      Pharynx: No oropharyngeal exudate. Eyes:      General: No scleral icterus. Right eye: No discharge. Left eye: No discharge. Pupils: Pupils are equal, round, and reactive to light. Neck:      Thyroid: No thyromegaly. Cardiovascular:      Rate and Rhythm: Normal rate and regular rhythm. Pulses:           Dorsalis pedis pulses are 2+ on the right side and 2+ on the left side. Posterior tibial pulses are 2+ on the right side and 2+ on the left side. Heart sounds: Normal heart sounds. No murmur heard. No friction rub. No gallop. Comments: No Edema Lower Extremities  Pulmonary:      Effort: Pulmonary effort is normal.      Breath sounds: Normal breath sounds. No wheezing or rales. Abdominal:      General: Bowel sounds are normal. There is no distension. Palpations: Abdomen is soft. There is no hepatomegaly or splenomegaly. Tenderness: There is no abdominal tenderness. There is no guarding or rebound. Musculoskeletal:         General: No tenderness or deformity. Normal range of motion. Cervical back: Normal range of motion and neck supple. Comments: Bilateral paraspinal tenderness of lower back. Antalgic gait   Lymphadenopathy:      Cervical: No cervical adenopathy. Skin:     General: Skin is warm and dry. Findings: No erythema or rash. Neurological:      Mental Status: He is alert. Cranial Nerves: No cranial nerve deficit. Sensory: No sensory deficit. Gait: Gait normal.   Psychiatric:         Speech: Speech normal.         Behavior: Behavior normal.           Assessment   Plan   1. Essential hypertension  Controlled: Appears stable. We will continue current management and monitor for adverse reaction and disease progression. Follow-up as noted below      2.  Class 3 severe obesity due to excess calories without serious comorbidity with body mass Colorectal Cancer Screen  09/15/2023    Diabetes screen  03/22/2024    DTaP/Tdap/Td vaccine (2 - Td or Tdap) 03/01/2029    Pneumococcal 0-64 years Vaccine (2 of 2 - PPSV23) 09/01/2039    Hepatitis A vaccine  Aged Out    Hepatitis B vaccine  Aged Out    Hib vaccine  Aged Out    Meningococcal (ACWY) vaccine  Aged Out     Recommendations for [employer] Due: see orders and patient instructions/AVS.    Return in about 6 months (around 9/24/2022). Advance Care Planning   Advanced Care Planning: Discussed the patients choices for care and treatment in case of a health event that adversely affects decision-making abilities. Also discussed the patients long-term treatment options. Reviewed with the patient the appropriate state-specific advance directive documents. Reviewed the process of designating a competent adult as an Agent (or -in-fact) that could take make health care decisions for the patient if incompetent. Patient was asked to complete the declaration forms, either acknowledge the forms by a public notary or an eligible witness and provide a signed copy to the practice office. Time spent (minutes): 3 minutes     Tobacco Cessation Counseling: Patient advised about behavior change, including information about personal health harms, usage of appropriate cessation measures and benefits of cessation.   Time spent (minutes): 7 minutes

## 2022-04-07 ENCOUNTER — PATIENT MESSAGE (OUTPATIENT)
Dept: PRIMARY CARE CLINIC | Age: 47
End: 2022-04-07

## 2022-04-07 NOTE — TELEPHONE ENCOUNTER
From: Claudetta Snooks  To: Dr. Rdz Spindle: 4/7/2022 3:19 PM EDT  Subject: Back pain    Dr Kenya Hightower my back is still killing me nothing is helping

## 2022-04-19 ENCOUNTER — PATIENT MESSAGE (OUTPATIENT)
Dept: PRIMARY CARE CLINIC | Age: 47
End: 2022-04-19

## 2022-04-19 DIAGNOSIS — I63.9 CEREBROVASCULAR ACCIDENT (CVA), UNSPECIFIED MECHANISM (HCC): Primary | ICD-10-CM

## 2022-04-19 NOTE — TELEPHONE ENCOUNTER
From: Shyam Mcarthur  To: Dr. Courtney Schroeder: 4/19/2022 2:12 PM EDT  Subject: Need forms    Hi Dr Henson Neither I'm trying to renew my  license and they sent me this letter.  But I don't see a neurologist so I don't know what to do I don't know if this is a letter you can write up

## 2022-04-25 ENCOUNTER — HOSPITAL ENCOUNTER (OUTPATIENT)
Age: 47
Setting detail: OBSERVATION
Discharge: HOME | End: 2022-04-26
Attending: EMERGENCY MEDICINE | Admitting: INTERNAL MEDICINE
Payer: COMMERCIAL

## 2022-04-25 ENCOUNTER — APPOINTMENT (OUTPATIENT)
Dept: GENERAL RADIOLOGY | Age: 47
End: 2022-04-25
Payer: COMMERCIAL

## 2022-04-25 ENCOUNTER — APPOINTMENT (OUTPATIENT)
Dept: CT IMAGING | Age: 47
End: 2022-04-25
Payer: COMMERCIAL

## 2022-04-25 DIAGNOSIS — H53.2 DIPLOPIA: Primary | ICD-10-CM

## 2022-04-25 DIAGNOSIS — R29.818 DIFFICULTY BALANCING: ICD-10-CM

## 2022-04-25 PROBLEM — Z86.73 HISTORY OF ARTERIAL ISCHEMIC STROKE: Status: ACTIVE | Noted: 2022-04-25

## 2022-04-25 LAB
A/G RATIO: 1.8 (ref 1.1–2.2)
ALBUMIN SERPL-MCNC: 4.4 G/DL (ref 3.4–5)
ALP BLD-CCNC: 87 U/L (ref 40–129)
ALT SERPL-CCNC: 28 U/L (ref 10–40)
ANION GAP SERPL CALCULATED.3IONS-SCNC: 11 MMOL/L (ref 3–16)
AST SERPL-CCNC: 27 U/L (ref 15–37)
BASOPHILS ABSOLUTE: 0.1 K/UL (ref 0–0.2)
BASOPHILS RELATIVE PERCENT: 0.8 %
BILIRUB SERPL-MCNC: 0.4 MG/DL (ref 0–1)
BUN BLDV-MCNC: 14 MG/DL (ref 7–20)
CALCIUM SERPL-MCNC: 9.8 MG/DL (ref 8.3–10.6)
CHLORIDE BLD-SCNC: 104 MMOL/L (ref 99–110)
CO2: 25 MMOL/L (ref 21–32)
CREAT SERPL-MCNC: 0.9 MG/DL (ref 0.9–1.3)
EOSINOPHILS ABSOLUTE: 0.3 K/UL (ref 0–0.6)
EOSINOPHILS RELATIVE PERCENT: 3.5 %
GFR AFRICAN AMERICAN: >60
GFR NON-AFRICAN AMERICAN: >60
GLUCOSE BLD-MCNC: 140 MG/DL (ref 70–99)
HCT VFR BLD CALC: 45 % (ref 40.5–52.5)
HEMOGLOBIN: 15.6 G/DL (ref 13.5–17.5)
INR BLD: 1.19 (ref 0.88–1.12)
LYMPHOCYTES ABSOLUTE: 3.7 K/UL (ref 1–5.1)
LYMPHOCYTES RELATIVE PERCENT: 45.9 %
MCH RBC QN AUTO: 31.9 PG (ref 26–34)
MCHC RBC AUTO-ENTMCNC: 34.6 G/DL (ref 31–36)
MCV RBC AUTO: 92.3 FL (ref 80–100)
MONOCYTES ABSOLUTE: 0.8 K/UL (ref 0–1.3)
MONOCYTES RELATIVE PERCENT: 10.3 %
NEUTROPHILS ABSOLUTE: 3.2 K/UL (ref 1.7–7.7)
NEUTROPHILS RELATIVE PERCENT: 39.5 %
PDW BLD-RTO: 12.9 % (ref 12.4–15.4)
PLATELET # BLD: 184 K/UL (ref 135–450)
PMV BLD AUTO: 9.3 FL (ref 5–10.5)
POTASSIUM REFLEX MAGNESIUM: 3.9 MMOL/L (ref 3.5–5.1)
PROTHROMBIN TIME: 13.5 SEC (ref 9.9–12.7)
RBC # BLD: 4.88 M/UL (ref 4.2–5.9)
SODIUM BLD-SCNC: 140 MMOL/L (ref 136–145)
TOTAL PROTEIN: 6.8 G/DL (ref 6.4–8.2)
TROPONIN: <0.01 NG/ML
WBC # BLD: 8.1 K/UL (ref 4–11)

## 2022-04-25 PROCEDURE — 70450 CT HEAD/BRAIN W/O DYE: CPT

## 2022-04-25 PROCEDURE — 84484 ASSAY OF TROPONIN QUANT: CPT

## 2022-04-25 PROCEDURE — 99285 EMERGENCY DEPT VISIT HI MDM: CPT

## 2022-04-25 PROCEDURE — G0378 HOSPITAL OBSERVATION PER HR: HCPCS

## 2022-04-25 PROCEDURE — 85025 COMPLETE CBC W/AUTO DIFF WBC: CPT

## 2022-04-25 PROCEDURE — 6360000004 HC RX CONTRAST MEDICATION: Performed by: EMERGENCY MEDICINE

## 2022-04-25 PROCEDURE — 85610 PROTHROMBIN TIME: CPT

## 2022-04-25 PROCEDURE — 80053 COMPREHEN METABOLIC PANEL: CPT

## 2022-04-25 PROCEDURE — 70496 CT ANGIOGRAPHY HEAD: CPT

## 2022-04-25 PROCEDURE — 93005 ELECTROCARDIOGRAM TRACING: CPT | Performed by: EMERGENCY MEDICINE

## 2022-04-25 PROCEDURE — 71045 X-RAY EXAM CHEST 1 VIEW: CPT

## 2022-04-25 RX ORDER — POLYETHYLENE GLYCOL 3350 17 G/17G
17 POWDER, FOR SOLUTION ORAL DAILY PRN
Status: DISCONTINUED | OUTPATIENT
Start: 2022-04-25 | End: 2022-04-26 | Stop reason: HOSPADM

## 2022-04-25 RX ORDER — METOPROLOL SUCCINATE 50 MG/1
25 TABLET, EXTENDED RELEASE ORAL DAILY
Status: DISCONTINUED | OUTPATIENT
Start: 2022-04-26 | End: 2022-04-26 | Stop reason: HOSPADM

## 2022-04-25 RX ORDER — ONDANSETRON 4 MG/1
4 TABLET, ORALLY DISINTEGRATING ORAL EVERY 8 HOURS PRN
Status: DISCONTINUED | OUTPATIENT
Start: 2022-04-25 | End: 2022-04-26 | Stop reason: HOSPADM

## 2022-04-25 RX ORDER — LABETALOL HYDROCHLORIDE 5 MG/ML
10 INJECTION, SOLUTION INTRAVENOUS EVERY 10 MIN PRN
Status: DISCONTINUED | OUTPATIENT
Start: 2022-04-25 | End: 2022-04-26 | Stop reason: HOSPADM

## 2022-04-25 RX ORDER — ONDANSETRON 2 MG/ML
4 INJECTION INTRAMUSCULAR; INTRAVENOUS EVERY 6 HOURS PRN
Status: DISCONTINUED | OUTPATIENT
Start: 2022-04-25 | End: 2022-04-26 | Stop reason: HOSPADM

## 2022-04-25 RX ORDER — LOSARTAN POTASSIUM 25 MG/1
25 TABLET ORAL DAILY
Status: DISCONTINUED | OUTPATIENT
Start: 2022-04-26 | End: 2022-04-26 | Stop reason: HOSPADM

## 2022-04-25 RX ORDER — ATORVASTATIN CALCIUM 40 MG/1
40 TABLET, FILM COATED ORAL NIGHTLY
Status: DISCONTINUED | OUTPATIENT
Start: 2022-04-25 | End: 2022-04-26 | Stop reason: HOSPADM

## 2022-04-25 RX ORDER — ASPIRIN 81 MG/1
81 TABLET, CHEWABLE ORAL DAILY
Status: DISCONTINUED | OUTPATIENT
Start: 2022-04-26 | End: 2022-04-26 | Stop reason: HOSPADM

## 2022-04-25 RX ORDER — LORAZEPAM 1 MG/1
2 TABLET ORAL
Status: ACTIVE | OUTPATIENT
Start: 2022-04-25 | End: 2022-04-25

## 2022-04-25 RX ADMIN — IOPAMIDOL 75 ML: 755 INJECTION, SOLUTION INTRAVENOUS at 20:47

## 2022-04-26 ENCOUNTER — APPOINTMENT (OUTPATIENT)
Dept: MRI IMAGING | Age: 47
End: 2022-04-26
Payer: COMMERCIAL

## 2022-04-26 VITALS
SYSTOLIC BLOOD PRESSURE: 161 MMHG | TEMPERATURE: 97.8 F | HEIGHT: 71 IN | WEIGHT: 308.2 LBS | OXYGEN SATURATION: 95 % | BODY MASS INDEX: 43.15 KG/M2 | DIASTOLIC BLOOD PRESSURE: 66 MMHG | HEART RATE: 65 BPM | RESPIRATION RATE: 16 BRPM

## 2022-04-26 LAB
ANION GAP SERPL CALCULATED.3IONS-SCNC: 11 MMOL/L (ref 3–16)
BUN BLDV-MCNC: 10 MG/DL (ref 7–20)
CALCIUM SERPL-MCNC: 9.7 MG/DL (ref 8.3–10.6)
CHLORIDE BLD-SCNC: 106 MMOL/L (ref 99–110)
CHOLESTEROL, TOTAL: 88 MG/DL (ref 0–199)
CO2: 24 MMOL/L (ref 21–32)
CREAT SERPL-MCNC: 0.8 MG/DL (ref 0.9–1.3)
EKG ATRIAL RATE: 65 BPM
EKG DIAGNOSIS: NORMAL
EKG P AXIS: 11 DEGREES
EKG P-R INTERVAL: 188 MS
EKG Q-T INTERVAL: 432 MS
EKG QRS DURATION: 104 MS
EKG QTC CALCULATION (BAZETT): 449 MS
EKG R AXIS: -18 DEGREES
EKG T AXIS: -6 DEGREES
EKG VENTRICULAR RATE: 65 BPM
ESTIMATED AVERAGE GLUCOSE: 108.3 MG/DL
GFR AFRICAN AMERICAN: >60
GFR NON-AFRICAN AMERICAN: >60
GLUCOSE BLD-MCNC: 117 MG/DL (ref 70–99)
GLUCOSE BLD-MCNC: 99 MG/DL (ref 70–99)
HBA1C MFR BLD: 5.4 %
HCT VFR BLD CALC: 44.5 % (ref 40.5–52.5)
HDLC SERPL-MCNC: 26 MG/DL (ref 40–60)
HEMOGLOBIN: 15.3 G/DL (ref 13.5–17.5)
LDL CHOLESTEROL CALCULATED: 32 MG/DL
MCH RBC QN AUTO: 31.6 PG (ref 26–34)
MCHC RBC AUTO-ENTMCNC: 34.4 G/DL (ref 31–36)
MCV RBC AUTO: 91.8 FL (ref 80–100)
PDW BLD-RTO: 12.8 % (ref 12.4–15.4)
PERFORMED ON: ABNORMAL
PLATELET # BLD: 169 K/UL (ref 135–450)
PMV BLD AUTO: 9.5 FL (ref 5–10.5)
POTASSIUM REFLEX MAGNESIUM: 4.2 MMOL/L (ref 3.5–5.1)
RBC # BLD: 4.84 M/UL (ref 4.2–5.9)
SODIUM BLD-SCNC: 141 MMOL/L (ref 136–145)
TRIGL SERPL-MCNC: 150 MG/DL (ref 0–150)
VLDLC SERPL CALC-MCNC: 30 MG/DL
WBC # BLD: 7.8 K/UL (ref 4–11)

## 2022-04-26 PROCEDURE — G0378 HOSPITAL OBSERVATION PER HR: HCPCS

## 2022-04-26 PROCEDURE — 6370000000 HC RX 637 (ALT 250 FOR IP): Performed by: INTERNAL MEDICINE

## 2022-04-26 PROCEDURE — 93010 ELECTROCARDIOGRAM REPORT: CPT | Performed by: INTERNAL MEDICINE

## 2022-04-26 PROCEDURE — 85027 COMPLETE CBC AUTOMATED: CPT

## 2022-04-26 PROCEDURE — 80061 LIPID PANEL: CPT

## 2022-04-26 PROCEDURE — 83036 HEMOGLOBIN GLYCOSYLATED A1C: CPT

## 2022-04-26 PROCEDURE — 99219 PR INITIAL OBSERVATION CARE/DAY 50 MINUTES: CPT | Performed by: PSYCHIATRY & NEUROLOGY

## 2022-04-26 PROCEDURE — 36415 COLL VENOUS BLD VENIPUNCTURE: CPT

## 2022-04-26 PROCEDURE — 80048 BASIC METABOLIC PNL TOTAL CA: CPT

## 2022-04-26 RX ORDER — LORAZEPAM 2 MG/ML
1 INJECTION INTRAMUSCULAR
Status: DISCONTINUED | OUTPATIENT
Start: 2022-04-26 | End: 2022-04-26 | Stop reason: HOSPADM

## 2022-04-26 RX ORDER — LORAZEPAM 2 MG/ML
1 INJECTION INTRAMUSCULAR ONCE
Status: DISCONTINUED | OUTPATIENT
Start: 2022-04-26 | End: 2022-04-26

## 2022-04-26 RX ADMIN — APIXABAN 5 MG: 5 TABLET, FILM COATED ORAL at 08:44

## 2022-04-26 RX ADMIN — METOPROLOL SUCCINATE 25 MG: 50 TABLET, EXTENDED RELEASE ORAL at 08:44

## 2022-04-26 RX ADMIN — ASPIRIN 81 MG 81 MG: 81 TABLET ORAL at 08:44

## 2022-04-26 NOTE — ED NOTES
2027- [facility]  Per [address] returned page  2028- CT scan called     Luz Maria Dlegado  04/25/22 2032

## 2022-04-26 NOTE — CONSULTS
In patient Neurology consult        [facility]      MD Royal Juares Pari  1975    Date of Service: 2022    Referring Physician: Milena Jama MD      Reason for the consult and CC: Acute diplopia    HPI:   The patient is a 51 y.o. male, with a PMH of PAF on Eliquis, remote optic nerve infarct, HTN, who presented to [facility] with diplopia. The patient was out in the woods looking for mushrooms, when he noticed the trees far off in the distance all seemed to \"blur together\". He had never noticed this before and became concerned given his history of left optic nerve infarct. He does not appreciate the double vision up close. He has not had a recent ophthalmologic exam. He denies fever, chills, headache, dizziness, dysarthria, dysphagia, tinnitus, focal weakness, and paraesthesias. Family History   Problem Relation Age of Onset    Diabetes Son    Ashley Haynes Other Mother          with pericardial effusion    Other Father         AW     Past Surgical History:   Procedure Laterality Date    CARDIAC CATHETERIZATION      CARDIAC CATHETERIZATION  2021    Dr. Suzan Morris: no obstruction noted    COLONOSCOPY  09/15/2020    with biopsy    COLONOSCOPY N/A 09/15/2020    COLONOSCOPY WITH BIOPSY performed by Kristian Martins MD at [address] [place] [address]    Dr. 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    Polycystic kidney     Dr. Nevin Guz[facility]     Stage 2 chronic kidney disease 2017    Tubular adenoma of colon 2020    Needs 3 year follow up.      Social History     Tobacco Use    Smoking status: Current Every Day Smoker     Packs/day: 1.00     Types: Cigarettes    Smokeless tobacco: Never Used    Tobacco comment: contemplating   Vaping Use    Vaping Use: Never used   Substance Use Topics    Alcohol use:  Yes     Alcohol/week: 0.0 standard drinks     Comment: weekend    Drug use: No     Allergies   Allergen Reactions    Ibuprofen      kidneys    Vicodin [Hydrocodone-Acetaminophen]     Codeine Rash     Current Facility-Administered Medications   Medication Dose Route Frequency Provider Last Rate Last Admin    LORazepam (ATIVAN) injection 1 mg  1 mg IntraVENous Q15 Min PRN Christiano Middleton MD        apixaban Carvel Efrain) tablet 5 mg  5 mg Oral BID Michaela Pedraza MD   5 mg at 04/26/22 0844    aspirin chewable tablet 81 mg  81 mg Oral Daily Michaela Pedraza MD   81 mg at 04/26/22 0844    atorvastatin (LIPITOR) tablet 40 mg  40 mg Oral Nightly Michaela Pedraza MD        losartan (COZAAR) tablet 25 mg  25 mg Oral Daily Michaela Pedraza MD        metoprolol succinate (TOPROL XL) extended release tablet 25 mg  25 mg Oral Daily Michaela Pedraza MD   25 mg at 04/26/22 0844    ondansetron (ZOFRAN-ODT) disintegrating tablet 4 mg  4 mg Oral Q8H PRN Michaela Pedarza MD        Or    [facility] injection 4 mg  4 mg IntraVENous Q6H PRN Michaela Pedraza MD        polyethylene glycol (GLYCOLAX) packet 17 g  17 g Oral Daily PRN Michaela Pedraza MD        labetalol (NORMODYNE;TRANDATE) injection 10 mg  10 mg IntraVENous Q10 Min PRN Michaela Pedraza MD           ROS : A 10-14 system review of constitutional, cardiovascular, respiratory, eyes, musculoskeletal, endocrine, GI, ENT, skin, hematological, genitourinary, psychiatric and neurologic systems was obtained and updated today and is unremarkable except as mentioned in my HPI      Exam:     Constitutional:   Vitals:    04/25/22 2307 04/26/22 0037 04/26/22 0415 04/26/22 0836   BP: 133/85 (!) 187/99 (!) 165/93 (!) 161/66   Pulse: 73 65 70 65   Resp: 16 19 18 16   Temp:  98.3 °F (36.8 °C) 98.6 °F (37 °C) 98 °F (36.7 °C)   TempSrc:  Oral Oral Oral   SpO2: 95% 94% 96% 95%   Weight:       Height:           General appearance and observation: Normal development and appear in no acute distress. Eye:  Fundus: No blurring of optic disc. Neck: supple  Cardiovascular: No lower leg edema with good pulsation. Mental Status:   Oriented to person, place, problem, and time. Memory: Good immediate recall. Intact remote memory  Normal attention span and concentration. Language: intact naming, repeating and fluency   Good fund of Knowledge. Aware of current events and vocabulary   Cranial Nerves:   II: Visual fields: Full. Pupils: equal, round, reactive to light  III,IV,VI: Extra Ocular Movements are intact. No nystagmus  V: Facial sensation is intact  VII: Facial strength and movements: intact and symmetric  VIII: Hearing: Intact  IX: Palate elevation is symmetric  XI: Shoulder shrug is intact  XII: Tongue movements are normal  Musculoskeletal: 5/5 in all 4 extremities. Tone: Normal tone. Reflexes: Symmetric 2+ in the arms and 2+ in the legs   Planters: flexor bilaterally. Coordination: no pronator drift, no dysmetria with FNF in upper extremities. Normal REM. Sensation: normal to all modalities in both arms and legs. Gait/Posture: steady gait and normal posturing and station.      Data:  LABS:   Lab Results   Component Value Date     04/26/2022    K 4.2 04/26/2022     04/26/2022    CO2 24 04/26/2022    BUN 10 04/26/2022    CREATININE 0.8 04/26/2022    GFRAA >60 04/26/2022    GFRAA >60 06/11/2013    LABGLOM >60 04/26/2022    GLUCOSE 99 04/26/2022    PHOS 3.4 03/22/2021    MG 1.90 03/22/2021    CALCIUM 9.7 04/26/2022     Lab Results   Component Value Date    WBC 7.8 04/26/2022    RBC 4.84 04/26/2022    HGB 15.3 04/26/2022    HCT 44.5 04/26/2022    MCV 91.8 04/26/2022    RDW 12.8 04/26/2022     04/26/2022     Lab Results Component Value Date    INR 1.19 (H) 04/25/2022    PROTIME 13.5 (H) 04/25/2022       Neuroimaging was independently reviewed by myself and discussed results with the patient and/or family  Reviewed notes from different physicians  Reviewed lab and blood testing    Impression:     Acute diplopia - only affected his distance vision. Does no longer appreciate it. CT head is negative. CTA head/neck without LVO. HTN  PAF on Eliquis    HLD, controlled. LDL 32. Recommendation:     Patient states he is very claustrophobic and unsure if he will be able to tolerate MRI without anesthesia. He is willing to try with IV Ativan, which was ordered by the primary team.    If MRI showed a small stroke, would not  as patient is already on ASA and Eliquis at baseline. Discussed with patient and wife at bedside. MRI of brain  Monitor on tele. Continue ASA, Eliquis, statin. Continue home BP meds. PT/OT/SLP not indicated    Thank you for referring such patient. If you have any questions regarding my consult note, please don't hesitate to call me. Joselo Man, CNP    Attending Supervising [de-identified] Attestation Statement      The patient was seen 4/26/2022 in conjunction with the nurse practitioner with independent history, evaluation and examination. I agree with the note which has been adjusted to reflect my findings, with the addition of the following: The patient is 51 y.o.  male  who was admitted for   new onset diplopia. Onset few days ago. Description painless double vision which is monocular and worse with the far field. No headache, weakness or numbness or tingling or blurred vision. No other relieving or aggravating factors. Degree is moderate and duration is persistent. Patient had CT in the emergency room which showed no acute findings. CTA showed no large vessel occlusion. He is on Eliquis. On examination:  No acute distress  Awake and alert x3. Fluent speech. Appears appropriate with intact recent and remote memory. Pupil reactive and symmetric, extraocular motor intact, no ophthalmoplegia, face is symmetric and tongue is midline  No focal weakness with symmetric DTR  Normal tone  No sensory disturbance or abnormal movement    Impression:   New onset painless diplopia. Nonfocal exam today. Nonspecific. Could be right 6th nerve ischemia, small stroke or idiopathic  A. fib on Eliquis, hypertension and hyperlipidemia  Possible sleep apnea      Recommendation:  Stroke education was provided today  Continue Eliquis  Sleep evaluation consideration with PCP in outpatient setting for possible sleep apnea  Statin  Continue current blood pressure medications and monitor at home  Follow-up ophthalmology outpatient  Stroke education was provided  Follow lipid panel and A1c  So far I do not feel MRI would change our management since he is already on Eliquis and aspirin  Can be discharged once medically stable      Electronically signed by Lisset Interiano MD on 4/26/22 at 3:33 PM EDT        This dictation was generated by voice recognition computer software.  Although all attempts are made to edit the dictation for accuracy, there may be errors in the  transcription that are not intended

## 2022-04-26 NOTE — ED PROVIDER NOTES
[facility] Emergency Department      CHIEF COMPLAINT  Dizziness (c/o dizziness and double vision. h/o stroke. LKW at 0700. symptoms started at 0900) and Diplopia      HISTORY OF PRESENT ILLNESS  Syed Grandyusuf is a 51 y.o. male with a history of atrial fibrillation, anticoagulated on Eliquis also with a history of CVA in 2020. This is when his A. fib was diagnosed. He reports that he has a visual field cut in his left eye chronically. He presents today with diplopia and what he describes as dizziness. He states he was normal when he got up at 7:00 this morning. Around 9 AM while he was out hunting for mushrooms he noticed that he felt like his balance was off. He felt like he could fall because he was so off balance. He also noted that he when he was trying to focus and look at trees is vision was double. He denies blurred vision. No new visual field cuts. He denies weakness or numbness of extremities. No chest pain or shortness of breath. .   No other complaints, modifying factors or associated symptoms. I have reviewed the following from the nursing documentation. 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    Polycystic kidney     Dr. Catrachita Keita PSVT (paroxysmal supraventricular tachycardia) ([facility] [address]     Stage 2 chronic kidney disease 08/12/2017    Tubular adenoma of colon 09/21/2020    Needs 3 year follow up.      Past Surgical History:   Procedure Laterality Date    CARDIAC CATHETERIZATION  2008    CARDIAC CATHETERIZATION  03/22/2021    Dr. Wade Guerra: no obstruction noted    COLONOSCOPY  09/15/2020    with biopsy    COLONOSCOPY N/A 09/15/2020    COLONOSCOPY WITH BIOPSY performed by Hollie Grullon MD at [place] [address] [address]      Dr. Alexandro ALVARADO flank hematoma removal     Family History   Problem Relation Age of Onset    Diabetes Son    Lionel Loser Other Mother          with pericardial effusion    Other Father         AW     Social History     Socioeconomic History    Marital status:      Spouse name: Gwendolyn Cross Number of children: 2    Years of education: Not on file    Highest education level: Not on file   Occupational History    Occupation:    Tobacco Use    Smoking status: Current Every Day Smoker     Packs/day: 1.00     Types: Cigarettes    Smokeless tobacco: Never Used    Tobacco comment: contemplating   Vaping Use    Vaping Use: Never used   Substance and Sexual Activity    Alcohol use: Yes     Alcohol/week: 0.0 standard drinks     Comment: weekend    Drug use: No    Sexual activity: Not Currently   Other Topics Concern    Not on file   Social History Narrative    Not on file     Social Determinants of Health     Financial Resource Strain: Low Risk     Difficulty of Paying Living Expenses: Not hard at all   Food Insecurity: No Food Insecurity    Worried About [address] in the Last Year: Never true    [address] [employer] in the Last Year: Never true   Transportation Needs:     Lack of Transportation (Medical): Not on file    Lack of Transportation (Non-Medical):  Not on file   Physical Activity:     Days of Exercise per Week: Not on file    Minutes of Exercise per Session: Not on file   Stress:     Feeling of Stress : Not on file   Social Connections:     Frequency of Communication with Friends and Family: Not on file    Frequency of Social Gatherings with Friends and Family: Not on file    Attends Baptism Services: Not on file    Active Member of Clubs or Organizations: Not on file    Attends Club or Organization Meetings: Not on file    Marital Status: Not on file   Intimate Partner Violence:     Fear of Current or Ex-Partner: Not on file   [employer] Abused: Not on file    Physically Abused: Not on file    Sexually Abused: Not on file   Housing Stability:     Unable to Pay for Housing in the Last Year: Not on file    Number of Places Lived in the Last Year: Not on file    Unstable Housing in the Last Year: Not on file     No current facility-administered medications for this encounter. Current Outpatient Medications   Medication Sig Dispense Refill    apixaban (ELIQUIS) 5 MG TABS tablet Take 1 tablet by mouth 2 times daily 180 tablet 3    metoprolol succinate (TOPROL XL) 25 MG extended release tablet Take 1 tablet by mouth daily 90 tablet 3    losartan (COZAAR) 25 MG tablet Take 1 tablet by mouth daily 90 tablet 3    atorvastatin (LIPITOR) 40 MG tablet Take 1 tablet by mouth nightly 90 tablet 3    ketoconazole (NIZORAL) 2 % cream Apply topically daily. 30 g 1    aspirin 81 MG chewable tablet Take 81 mg by mouth daily       Allergies   Allergen Reactions    Ibuprofen      kidneys    Vicodin [Hydrocodone-Acetaminophen]     Codeine Rash       REVIEW OF SYSTEMS      General:  No fevers  Eyes: Diplopia. ENT:  No sore throat, no nasal congestion  Cardiovascular:  no palpitations  Respiratory:   no cough, no wheezing  Gastrointestinal:  No abdominal pain, no vomiting, no diarrhea  Musculoskeletal:  No muscle pain, no joint pain  Skin:  No rash   Neurologic:  No speech problems, no headache, no extremity numbness, no extremity weakness.   Dizziness  Genitourinary:  No dysuria  Extremities:  no edema, no pain      Unless otherwise stated in this report, this patient's positive and negative responses for review of systems (constitutional, eyes, ENT, cardiovascular, respiratory, gastrointestinal, neurological, genitourinary, musculoskeletal, integument systems and systems related to the presenting problem) are either stated in the preceding paragraph, were not pertinent or were negative for the symptoms and/or complaints related to the medical problem. PHYSICAL EXAM  BP (!) 142/86   Pulse 67   Temp 98.7 °F (37.1 °C) (Oral)   Resp 17   Ht 5' 11\" (1.803 m)   Wt (!) 308 lb 3.2 oz (139.8 kg)   SpO2 98%   BMI 42.99 kg/m²   GENERAL APPEARANCE: Awake and alert. Cooperative. No acute distress. HEAD: Normocephalic. Atraumatic. EYES: PERRL. EOM's grossly intact. No ocular nerve palsy appreciated. ENT: Mucous membranes are moist.   NECK: Supple, trachea midline. HEART: RRR. LUNGS: Respirations unlabored. CTAB. Good air exchange. No wheezes, rales, or rhonchi. Speaking comfortably in full sentences. ABDOMEN: Soft. Non-distended. Non-tender. No guarding or rebound. EXTREMITIES: No peripheral edema. MAEE. No acute deformities. SKIN: Warm, dry and intact. No acute rashes. NEUROLOGICAL: Alert and oriented X 3. NIH stroke scale equals 0. Visual fields intact. PSYCHIATRIC: Normal mood and affect. LABS  I have reviewed all labs for this visit.    Results for orders placed or performed during the hospital encounter of 04/25/22   CBC with Auto Differential   Result Value Ref Range    WBC 8.1 4.0 - 11.0 K/uL    RBC 4.88 4.20 - 5.90 M/uL    Hemoglobin 15.6 13.5 - 17.5 g/dL    Hematocrit 45.0 40.5 - 52.5 %    MCV 92.3 80.0 - 100.0 fL    MCH 31.9 26.0 - 34.0 pg    MCHC 34.6 31.0 - 36.0 g/dL    RDW 12.9 12.4 - 15.4 %    Platelets 219 050 - 036 K/uL    MPV 9.3 5.0 - 10.5 fL    Neutrophils % 39.5 %    Lymphocytes % 45.9 %    Monocytes % 10.3 %    Eosinophils % 3.5 %    Basophils % 0.8 %    Neutrophils Absolute 3.2 1.7 - 7.7 K/uL    Lymphocytes Absolute 3.7 1.0 - 5.1 K/uL    Monocytes Absolute 0.8 0.0 - 1.3 K/uL    Eosinophils Absolute 0.3 0.0 - 0.6 K/uL    Basophils Absolute 0.1 0.0 - 0.2 K/uL   Comprehensive Metabolic Panel w/ Reflex to MG   Result Value Ref Range    Sodium 140 136 - 145 mmol/L    Potassium reflex Magnesium 3.9 3.5 - 5.1 mmol/L    Chloride 104 99 - 110 mmol/L    CO2 25 21 - 32 mmol/L    Anion Gap 11 3 - 16    Glucose 140 (H) 70 - 99 mg/dL    BUN 14 7 - 20 mg/dL    CREATININE 0.9 0.9 - 1.3 mg/dL    GFR Non-African American >60 >60    GFR African American >60 >60    Calcium 9.8 8.3 - 10.6 mg/dL    Total Protein 6.8 6.4 - 8.2 g/dL    Albumin 4.4 3.4 - 5.0 g/dL    Albumin/Globulin Ratio 1.8 1.1 - 2.2    Total Bilirubin 0.4 0.0 - 1.0 mg/dL    Alkaline Phosphatase 87 40 - 129 U/L    ALT 28 10 - 40 U/L    AST 27 15 - 37 U/L   Troponin   Result Value Ref Range    Troponin <0.01 <0.01 ng/mL   Protime-INR   Result Value Ref Range    Protime 13.5 (H) 9.9 - 12.7 sec    INR 1.19 (H) 0.88 - 1.12   EKG 12 Lead   Result Value Ref Range    Ventricular Rate 65 BPM    Atrial Rate 65 BPM    P-R Interval 188 ms    QRS Duration 104 ms    Q-T Interval 432 ms    QTc Calculation (Bazett) 449 ms    P Axis 11 degrees    R Axis -18 degrees    T Axis -6 degrees    Diagnosis       Normal sinus rhythmNormal ECGWhen compared with ECG of 21-MAR-2021 20:43,Nonspecific T wave abnormality, worse in Inferior leads       EKG  The Ekg interpreted by myself  normal sinus rhythm with a rate of 65  Axis is   Normal  QTc is  normal  Intervals and Durations are unremarkable. No specific ST-T wave changes appreciated. No evidence of acute ischemia. No significant change from prior EKG dated August 17, 2021      Cardiac Monitoring: The cardiac monitor revealed normal sinus rhythm as interpreted by me. The cardiac monitor was ordered secondary to the patient's complaint of dizziness and to monitor the patient for dysrhythmia. RADIOLOGY  X-RAYS: ALL IMAGES INCLUDING PLAIN FILMS, CT, ULTRASOUND AND MRI HAVE BEEN READ BY THE RADIOLOGIST. I have personally reviewed plain film images and have reviewed the radiology reports. XR CHEST PORTABLE   Final Result   No acute process. CTA HEAD NECK W CONTRAST   Final Result   1. No large vessel occlusion in the head or neck. 2.  Minimal atherosclerotic disease.       3.  Focal 2 mm outpouching in the region of the anterior communicating artery   is similar to MRA head done August 29, 2017 and could represent a small   A-comm aneurysm versus artifact. 4.  Multiple mildly prominent bilateral cervical lymph nodes are nonspecific   and may be reactive. Underlying neoplastic process such as lymphoma is   difficult to exclude in the appropriate clinical setting. This scan was analyzed using Viz. ai contact LVO. Identification of suspected   findings is not for diagnostic use beyond notification. Viz LVO is limited to   analysis of imaging data and should not be used in-lieu of full patient   evaluation or relied upon to make or confirm diagnosis. CT HEAD WO CONTRAST   Final Result   No acute intracranial abnormality. The findings were sent to the Radiology Results [id number] at 8:59   pm on 4/25/2022 to be communicated to a licensed caregiver. Rechecks: Physical assessment performed. Patient has been updated on his CT findings and lab work. He is agreeable to admission.         NIHSS: NIH Stroke Score:  1A: Level of Consciousness  Alert; keenly responsive 0  Arouses to minor stimulation +1  Requires repeated stimulation to arouse +2  Movements to Pain +2  Postures or Unresponsive +3     1B: Ask Month and Age  Both Questions Right 0  1 Question Right +1  0 Questions Right +2  Dysarthric/Intubated/ Trauma/Language Barrier +1  Aphasic +2     1C: 'Blink Eyes' & 'Squeeze Hands'(Pantomime Commands if Communication Barrier)  Performs Both Tasks0  Performs 1 Task+1  Performs 0 Tasks+2     2: Test Horizontal Extraocular Movements  Normal 0  Partial Gaze Palsy: Can Be Overcome +1  Partial Gaze Palsy: Corrects with Oculocephalic Reflex +1  Forced Gaze Palsy: Cannot Be Overcome +2     3: Test Visual Fields  No Visual Loss 0  Partial Hemianopia +1  Complete Hemianopia +2  Patient is Bilaterally Blind +3  Bilateral Hemianopia +3     4: Test Facial Palsy(Use Grimace if Obtunded)  Normal symmetry 0  Minor paralysis (flat nasolabial fold, smile asymmetry) +1  Partial paralysis (lower face) +2  Unilateral Complete paralysis (upper/lower face) +3  Bilateral Complete paralysis (upper/lower face) +3     5A: Test Left Arm Motor Drift  Amputation/Joint Fusion 0  No Drift for 10 Seconds 0  Drift, but doesn't hit bed +1  Drift, hits bed +2  Some Effort Against Gravity +2  No Effort Against Gravity +3  No Movement +4     5B:  Test Right Arm Motor Drift  Amputation/Joint Fusion 0  No Drift for 10 Seconds 0  Drift, but doesn't hit bed +1  Drift, hits bed +2  Some Effort Against Gravity +2  No Effort Against Gravity +3  No Movement +4     6A: Test Left Leg Motor Drift  Amputation/Joint Fusion 0  No Drift for 5 Seconds 0  Drift, but doesn't hit bed +1  Drift, hits bed +2  Some Effort Against Gravity +2  No Effort Against Gravity +3  No Movement +4     6B: Test Right Leg Motor Drift  Amputation/Joint Fusion 0  No Drift for 5 Seconds 0  Drift, but doesn't hit bed +1  Drift, hits bed +2  Some Effort Against Gravity +2  No Effort Against Gravity +3  No Movement +4     7: Test Limb Ataxia(FTN/Heel-Shin)  Amputation/Joint Fusion 0  Does Not Understand 0  Paralyzed 0  No Ataxia 0  Ataxia in 1 Limb +1  Ataxia in 2 Limbs +2     8: Test Sensation  Normal; No sensory loss 0  Mild-Moderate Loss: Less Sharp/More Dull +1  Mild-Moderate Loss: Can Sense Being Touched +1  Complete Loss: Cannot Sense Being Touched At All +2  No Response and Quadriplegic +2  Coma/Unresponsive +2     9: Test Language/Aphasia (Describe the scene; name the words; read the sentences)  Normal; No aphasia 0  Mild-Moderate Aphasia: Some Obvious Changes, Without Significant Limitation +1  Severe Aphasia: Fragmentary Expression, Inference Needed, Cannot Identify   Materials +2  Mute/Global Aphasia: No Usable Speech/Auditory Comprehension +3  Coma/Unresponsive +3     10: Test Dysarthria (Read the words)  Intubated/Unable to Test 0  Normal 0  Mild-Moderate Dysarthria: Slurring but can be understood +1  Severe Dysarthria: Unintelligble Slurring or Out of Proportion to Dysphasia +2  Mute/Anarthric +2     11: Test Extinction/Inattention  No abnormality 0  Visual/tactile/auditory/spatial/personal inattention +1  Extinction to bilateral simultaneous stimulation +1  Profound cece-inattention (ex: does not recognize own hand) +2  Extinction to >1 modality +2     NIH score is 0          ED COURSE/MDM  Patient seen and evaluated. Here the patient is afebrile afebrile with normal vital signs. I evaluated the patient immediately on arrival to the triage room. He is complaining of diplopia and balance difficulty. NIH stroke scale is 0 but he does feel like his balance is off when ambulating. No obvious truncal ataxia. Given his symptoms and his clinical history I did activate a stroke alert. Last known well was 7 AM today. He is outside of the window for tPA. He is also on Eliquis which makes him not a candidate for tPA. I spoke with the stroke team and they agree with the work-up. CT head and CTA head and neck showed no acute findings. EKG shows sinus rhythm today. Troponin is negative, lab work is reassuring. Given his symptoms we will admit him for further work-up for acute stroke or posterior circulation stroke. He will be admitted to the hospitalist. Old records reviewed. Labs and imaging reviewed and results discussed with patient. Patient was reassessed as noted above . Plan of care discussed with patient. Patient in agreement with plan. CLINICAL IMPRESSION  1. Diplopia    2. Difficulty balancing        Blood pressure (!) 142/86, pulse 67, temperature 98.7 °F (37.1 °C), temperature source Oral, resp. rate 17, height 5' 11\" (1.803 m), weight (!) 308 lb 3.2 oz (139.8 kg), SpO2 98 %. DISPOSITION  Simon Teran was admitted in stable condition.     (Please note this note was completed with a voice recognition program.  Efforts were made to edit the dictations but occasionally words are mis-transcribed.)                Radha Rios MD  04/25/22 [id number]

## 2022-04-26 NOTE — PROGRESS NOTES
Hospitalist Progress Note    CC: Diplopia    Hospital course:  54yo WM with PMHx sig for paroxysmal afib, HTN, secondary hypercoagulable state and hx of  L optic CVA presents with vision changed. He stated that he was dizzy and having double vision. He had zyjc-nf-idld diplopia that persisted upon returning home. He is currently on eliquis for paroxysmal afib. Last eye exam was 6 months ago at [facility]. Pt is profoundly claustrophobic and failed sedation attempt with IV ativan in the past.  He typically goes to an open MRI. Consulted neuro for their opinion and they recommended IV ativan which he has already failed. Will keep NPO after midnight and consult anesthesia as back up for MRI under sedation. Admit date: 4/25/2022  Days in hospital:  0    24 Hour Events: vision changes improved    Subjective: feels better, but neuro does want inpatient MRI    ROS:   A comprehensive review of systems was negative except for: denies pain or dizziness currently . Objective:    BP (!) 161/66   Pulse 65   Temp 97.8 °F (36.6 °C) (Oral)   Resp 16   Ht 5' 11\" (1.803 m)   Wt (!) 308 lb 3.2 oz (139.8 kg)   SpO2 95%   BMI 42.99 kg/m²     Gen: obese  HEENT: NC/AT, moist mucous membranes, no oropharyngeal erythema or exudate  Neck: supple, trachea midline, no anterior cervical or SC LAD  Heart:  Normal s1/s2, RRR, no murmurs, gallops, or rubs. no leg edema  Lungs:  clear bilaterally, no wheeze, no rales, no rhonchi, no crackles, no use of accessory muscles  Abd: bowel sounds present, soft, nontender, nondistended, no masses  Extrem:  No clubbing, cyanosis,  no edema  Skin: no rashes or lesions  Psych: A & O x3  Neuro: grossly intact, moves all four extremities.     Assessment:    Principal Problem:    Diplopia  Active Problems:    Tobacco abuse    Essential hypertension    Atrial flutter, paroxysmal (HCC)    History of arterial ischemic stroke  Resolved Problems:    * No resolved hospital problems. *      Plan:  1. Diplopia - appears to be resolved  2.  afib - continue with ASA, eliquis, toprol XL  3. HTN - continue with losartan  4. Hx of CVA - continue with ASA and statin    Prognosis:  Good    Code status:  Full code    DVT prophylaxis: NOAC/Warfarin  GI prophylaxis: none  Antibiotic prophylaxis indicated:   no  Diet:  ADULT DIET; Regular  Diet NPO    Disposition:  Home    Medications:  Scheduled Meds:   apixaban  5 mg Oral BID    aspirin  81 mg Oral Daily    atorvastatin  40 mg Oral Nightly    losartan  25 mg Oral Daily    metoprolol succinate  25 mg Oral Daily       PRN Meds:  LORazepam, ondansetron **OR** ondansetron, polyethylene glycol, labetalol    IV:        Intake/Output Summary (Last 24 hours) at 4/26/2022 1530  Last data filed at 4/26/2022 0940  Gross per 24 hour   Intake 240 ml   Output --   Net 240 ml       Results:  CBC:   Recent Labs     04/25/22 2035 04/26/22  0611   WBC 8.1 7.8   HGB 15.6 15.3   HCT 45.0 44.5   MCV 92.3 91.8    169     BMP:   Recent Labs     04/25/22 2035 04/26/22  0611    141   K 3.9 4.2    106   CO2 25 24   BUN 14 10   CREATININE 0.9 0.8*     Mag: No results for input(s): MAG in the last 72 hours. Phos:   Lab Results   Component Value Date    PHOS 3.4 03/22/2021     No results found for: GLU    LIVER PROFILE:   Recent Labs     04/25/22 2035   AST 27   ALT 28   BILITOT 0.4   ALKPHOS 87     PT/INR:   Recent Labs     04/25/22 2035   PROTIME 13.5*   INR 1.19*     APTT: No results for input(s): APTT in the last 72 hours. UA:No results for input(s): NITRITE, COLORU, PHUR, LABCAST, WBCUA, RBCUA, MUCUS, TRICHOMONAS, YEAST, BACTERIA, CLARITYU, SPECGRAV, LEUKOCYTESUR, UROBILINOGEN, BILIRUBINUR, BLOODU, GLUCOSEU, AMORPHOUS in the last 72 hours.     Invalid input(s): [employer] input(s): ABG  Lab Results   Component Value Date    CALCIUM 9.7 04/26/2022    PHOS 3.4 03/22/2021               Electronically signed by Kenya Macias MD on 4/26/2022 at 3:30 PM

## 2022-04-26 NOTE — H&P
Hospital Medicine History & Physical      Patient:  Daylin Hoyos  :   1975  MRN:   [id number]  Date of Service: 22    Chief Complaint   Patient presents with    Dizziness     c/o dizziness and double vision. h/o stroke. LKW at 0700. symptoms started at 0900    Diplopia       HISTORY OF PRESENT ILLNESS:    Daylin Hoyos is a 51 y.o. male. He presented to the ER from home. He had been experiencing acute visual changes all day. He states he was hunting mushrooms in the woods early this morning around 7am with a friend. He relates he noticed when he would look through the woods \"the trees would all blend together\" and \"it was like there was four of everything when I turned my head. \"  He was only able to focus on objects close up, within about ten feet. He continued to notice [id number] or horizontal diplopia when attempting to look at distant objects after returning home and this persisted throughout the day. Patient denies any other new neurologic deficits. He has a h/o left optic nerve CVA. He thinks his last eye exam was about 6 months ago at [facility]. He does not think corrective lenses were recommended. He wears non-prescription \"readers\" for near vision. Review of Systems:  All pertinent positives and negatives are as noted in the HPI section. All other systems were reviewed and are negative. 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    Polycystic kidney     Dr. Keli Romano PSVT ([facility]oxys[facility] ([facility]     Stage 2 chronic kidney disease 2017    Tubular adenoma of colon 2020    Needs 3 year follow up.        Past Surgical History:   Procedure Laterality Date    CARDIAC CATHETERIZATION    CARDIAC CATHETERIZATION  2021    Dr. Richardson Tilley: no obstruction noted    COLONOSCOPY  09/15/2020    with biopsy    COLONOSCOPY N/A 09/15/2020    COLONOSCOPY WITH BIOPSY performed by Heidi Clayton MD at [facility]      Dr. Lexus Rojas - JENNY flank hematoma removal         Prior to Admission medications    Medication Sig Start Date End Date Taking? Authorizing Provider   apixaban (ELIQUIS) 5 MG TABS tablet Take 1 tablet by mouth 2 times daily 10/26/21   Mark Jean MD   metoprolol succinate (TOPROL XL) 25 MG extended release tablet Take 1 tablet by mouth daily 10/26/21   Mark Jean MD   losartan (COZAAR) 25 MG tablet Take 1 tablet by mouth daily 10/26/21   Mark Jean MD   atorvastatin (LIPITOR) 40 MG tablet Take 1 tablet by mouth nightly 21   Toni Eden MD   ketoconazole (NIZORAL) 2 % cream Apply topically daily. 21   Rashi Steen DO   aspirin 81 MG chewable tablet Take 81 mg by mouth daily    Historical Provider, MD       Allergies:   Ibuprofen, Vicodin [hydrocodone-acetaminophen], and Codeine    Social:   reports that he has been smoking cigarettes. He has been smoking about 1.00 pack per day. He has never used smokeless tobacco.   reports current alcohol use. Social History     Substance and Sexual Activity   Drug Use No       Family History   Problem Relation Age of Onset    Diabetes Son    Celeste Villa Other Mother          with pericardial effusion    Other Father         AW       PHYSICAL EXAM:  I performed this physical examination. Vitals:  Patient Vitals for the past 24 hrs:   BP Temp Temp src Pulse Resp SpO2 Height Weight   22 2210 (!) 142/86 -- -- 74 17 96 % -- --   22 (!) 139/93 -- -- 72 16 96 % -- --   22 -- -- -- -- -- -- -- (!) 308 lb 3.2 oz (139.8 kg)   22 (!) 151/92 98.7 °F (37.1 °C) Oral 72 -- -- 5' 11\" (1.803 m) 300 lb (136.1 kg)     Room air    GEN:  Appearance:  Obese male in NAD . Level of Consciousness:  alert . Orientation:  full    HEENT: Sclera anicteric.  no conjunctival chemosis. moist mucus membranes. no specific or diagnostic oral lesions. NECK:  no signs of meningismus. Jugular veins not distended. Carotid pulses 2+.  no cervical lymphadenopathy. no thyromegaly. CV:  regular rhythm. normal S1 & S2.    no murmur. no rub.  no gallop. PULM:  Chest excursion is symmetric. Breath sounds are vesicular. Adventitious sounds:  none    AB:  Abdominal shape is obese. Bowel sounds are active. Generally soft to palpation. no tenderness is present. no involuntary guarding. no rebound guarding. EXTR:  Skin is warm. Capillary refill brisk. no specific or pathognomic rash. no clubbing. no pitting edema. no active wound or ulcer. Pulses 2+ x 4    NEURO: Cranial nerves 2-12 are intact except for a visual field cut-off. There is upper inner quadrantanopia of the left eye. Otherwise PERRLA. EOMI. Tracking is smooth without nystagmus. Negative head impulse testing. No vertical gaze skew. No finger-nose dysmetria. Muscle tone:  normal.  Strength: 5/5 x 4. Pronator drift: absent. Sensation:  normal.      Deep tendon reflexes:  2+. Negative Rhomberg. Able to walk without assistance. Normal gait. LABS:  Lab Results   Component Value Date    WBC 8.1 04/25/2022    HGB 15.6 04/25/2022    HCT 45.0 04/25/2022    MCV 92.3 04/25/2022     04/25/2022     Lab Results   Component Value Date    CREATININE 0.9 04/25/2022    BUN 14 04/25/2022     04/25/2022    K 3.9 04/25/2022     04/25/2022    CO2 25 04/25/2022     Lab Results   Component Value Date    ALT 28 04/25/2022    AST 27 04/25/2022    ALKPHOS 87 04/25/2022    BILITOT 0.4 04/25/2022     Lab Results   Component Value Date    TROPONINI <0.01 04/25/2022     No results for input(s): PHART, [id number], PO2ART in the last 72 hours.     IMAGING:  CT HEAD WO CONTRAST    Result Date: 4/25/2022  EXAMINATION: CT OF THE HEAD WITHOUT CONTRAST  4/25/2022 8:34 pm TECHNIQUE: CT of the head was performed without the administration of intravenous contrast. Dose modulation, iterative reconstruction, and/or weight based adjustment of the mA/kV was utilized to reduce the radiation dose to as low as reasonably achievable. COMPARISON: None. HISTORY: ORDERING SYSTEM PROVIDED HISTORY: Stroke Symptoms TECHNOLOGIST PROVIDED HISTORY: Has a \"code stroke\" or \"stroke alert\" been called? ->Yes Reason for exam:->Stroke Symptoms Decision Support Exception - unselect if not a suspected or confirmed emergency medical condition->Emergency Medical Condition (MA) Reason for Exam: dizziness blurred vision FINDINGS: BRAIN/VENTRICLES: There is no acute intracranial hemorrhage, mass effect or midline shift. No abnormal extra-axial fluid collection. The gray-white differentiation is maintained without evidence of an acute infarct. There is no evidence of hydrocephalus. ORBITS: The visualized portion of the orbits demonstrate no acute abnormality. SINUSES: Minimal scattered paranasal sinus mucosal thickening. The bilateral mastoid air cells are clear. SOFT TISSUES/SKULL:  No acute abnormality of the visualized skull or soft tissues. No acute intracranial abnormality. The findings were sent to the Radiology Results [address] at 8:59 pm on 4/25/2022 to be communicated to a licensed caregiver. XR CHEST PORTABLE    Result Date: 4/25/2022  EXAMINATION: ONE XRAY VIEW OF THE CHEST 4/25/2022 8:53 pm COMPARISON: Chest radiograph and CT chest angiogram 03/21/2021. HISTORY: ORDERING SYSTEM PROVIDED HISTORY: stroke symptoms TECHNOLOGIST PROVIDED HISTORY: Reason for exam:->stroke symptoms Reason for Exam: code stroke FINDINGS: The lungs are without acute focal process. There is no effusion or pneumothorax. The cardiomediastinal silhouette is stable. The osseous structures are stable. No acute process.      CTA HEAD NECK W CONTRAST    Result Date: 4/25/2022  EXAMINATION: CTA OF THE HEAD AND NECK WITH CONTRAST 4/25/2022 8:46 pm: TECHNIQUE: CTA of the head and neck was performed with the administration of intravenous contrast. Multiplanar reformatted images are provided for review. MIP images are provided for review. Stenosis of the internal carotid arteries measured using NASCET criteria. Dose modulation, iterative reconstruction, and/or weight based adjustment of the mA/kV was utilized to reduce the radiation dose to as low as reasonably achievable. COMPARISON: Noncontrast CT head done same day. MRA head done 08/29/2017. HISTORY: ORDERING SYSTEM PROVIDED HISTORY: Stroke Symptoms TECHNOLOGIST PROVIDED HISTORY: Has a \"code stroke\" or \"stroke alert\" been called? ->Yes Reason for exam:->Stroke Symptoms Decision Support Exception - unselect if not a suspected or confirmed emergency medical condition->Emergency Medical Condition (MA) Reason for Exam: dizziness all day FINDINGS: CTA NECK: AORTIC ARCH/ARCH VESSELS: No dissection or arterial injury. No significant stenosis of the brachiocephalic or subclavian arteries. Mild atherosclerosis in the visualized thoracic aorta. CAROTID ARTERIES: No dissection, arterial injury, or hemodynamically significant stenosis by NASCET criteria. Mild bilateral carotid bulb atherosclerotic plaque. VERTEBRAL ARTERIES: No dissection, arterial injury, or significant stenosis. SOFT TISSUES: The lung apices are clear. Multiple mildly prominent bilateral cervical lymph nodes are nonspecific and may be reactive. The larynx and pharynx are unremarkable. No acute abnormality of the salivary and thyroid glands. BONES: No acute osseous abnormality. CTA HEAD: ANTERIOR CIRCULATION: No significant stenosis of the intracranial internal carotid, anterior cerebral, or middle cerebral arteries. Subtle 2 mm outpouching in the region and the anterior communicating artery.  POSTERIOR CIRCULATION: No significant stenosis of the vertebral, basilar, or posterior cerebral arteries. No aneurysm. OTHER: No dural venous sinus thrombosis on this non-dedicated study. BRAIN: No mass effect or midline shift. No extra-axial fluid collection. The gray-white differentiation is maintained. 1.  No large vessel occlusion in the head or neck. 2.  Minimal atherosclerotic disease. 3.  Focal 2 mm outpouching in the region of the anterior communicating artery is similar to MRA head done August 29, 2017 and could represent a small A-comm aneurysm versus artifact. 4.  Multiple mildly prominent bilateral cervical lymph nodes are nonspecific and may be reactive. Underlying neoplastic process such as lymphoma is difficult to exclude in the appropriate clinical setting. This scan was analyzed using Cupple. ai contact LVO. Identification of suspected findings is not for diagnostic use beyond notification. Viz LVO is limited to analysis of imaging data and should not be used in-lieu of full patient evaluation or relied upon to make or confirm diagnosis. I directly reviewed all recent imaging studies as well as pertinent prior studies. Radiology reports may or may not be available at the time of my review. EKG:  New and pertinent prior tracings were directly reviewed. My interpretation is as follows:  Normal sinus rhythm. Active Hospital Problems    Diagnosis Date Noted    History of arterial ischemic stroke [Z86.73] 04/25/2022     Priority: Medium    Diplopia [H53.2] 04/25/2022     Priority: Medium    Atrial flutter ([address] [I48.92] 10/19/2017    Essential hypertension [I10] 04/11/2016    Tobacco abuse [Z72.0] 04/04/2016       ASSESSMENT & PLAN  Diplopia, h/o optic nerve CVA  -  I suspect patient needs another eye exam and corrective lenses, but first need to exclude stroke and demyelinating dz's such as MS. Will attempt MRI brain w/ and w/o pricilla but patient states he requires is claustrophobic.   He has had problems tolerating conventional MRI's in the past, but has done ok with open MRI's. He is willing to try an MRI while hospitalized provided he receives a sedative. 2mg PO lorazepam ordered one prior to the scan. -  Err on the side of caution and enact standard acute-phase stroke measures such as swallow screening, scheduled neurochecks, permissive HTN, etc.  -  Continue patient's home ASA, apixaban, statin, metoprolol XL, and losartan. PAF, HTN  -  Currently in normal sinus.  -  Continue patient's home ASA, apixaban, statin, metoprolol XL, and losartan. DVT prophylaxis: SCDs, apixaban  Code Status:  Full  Disposition:  Observation. Anticipate d/c to home in 1-2 days.     Ralph Wallace MD MD

## 2022-04-26 NOTE — ED NOTES
Report given to HARI Dunn at bedside. NIHSS performed at hand-off.      Sanket Joseph RN  04/25/22 0656

## 2022-04-26 NOTE — PROGRESS NOTES
4 Eyes Skin Assessment     The patient is being assess for   Admission    I agree that 2 RN's have performed a thorough Head to Toe Skin Assessment on the patient. ALL assessment sites listed below have been assessed. Areas assessed for pressure by both nurses:   [x]   Head, Face, and Ears   [x]   Shoulders, Back, and Chest, Abdomen  [x]   Arms, Elbows, and Hands   [x]   Coccyx, Sacrum, and Ischium  [x]   Legs, Feet, and Heels        Skin Assessed Under all Medical Devices by both nurses:               All Mepilex Borders were peeled back and area peeked at by both nurses: n/a  Please list where Mepilex Borders are located:               **SHARE this note so that the co-signing nurse is able to place an eSignature**    Co-signer eSignature: Electronically signed by Allison Schrader RN on 4/26/22 at 6:46 AM EDT    Does the Patient have Skin Breakdown related to pressure?   No            Gurmeet Prevention initiated:  No   Wound Care Orders initiated:  No      WOC nurse consulted for Pressure Injury (Stage 3,4, Unstageable, DTI, NWPT, Complex wounds)and New or Established Ostomies:  No      Primary Nurse eSignature: Electronically signed by Heather Lainez RN on 4/26/22 at 6:42 AM EDT

## 2022-04-26 NOTE — ED PROVIDER NOTES
HPI   Zay Medel 51 y.o.  male with past medical history of stroke in 2020 and atrial fibrillation presents to the ED complaining of double vision and lightheadedness. Patient stated that this morning around 10am patient was in the woods hunting for mushrooms. Patient started experiencing double vision and lightheadedness. Patient stated that his balance is off. Patient woke up feeling normal this morning. Patient stated that in 2020 he had a stroke and presented with similar symptoms. Patient denies any headache, weakness, numbness, tingling, chest pain, shortness of breath, or other review of system symptoms. Patient stated he smokes pack of cigarettes a day. Patient denies any drug use including marijuana, cocaine, heroin, magical mushrooms or other drug. Patient stated he takes eliquis twice daily. Review of Systems   Constitutional: Negative for activity change, appetite change, chills, fatigue and fever. HENT: Negative for congestion, drooling, ear pain, hearing loss, sinus pressure, sore throat and trouble swallowing. Eyes: + for double vision. Negative for pain, discharge, redness, itching. Respiratory: Negative for apnea, choking, chest tightness, shortness of breath and wheezing. Cardiovascular: Negative for chest pain, palpitations and leg swelling. Gastrointestinal: Negative for abdominal distention, abdominal pain, blood in stool, constipation, nausea and vomiting. Endocrine: Negative for cold intolerance, heat intolerance and polydipsia. Genitourinary: Negative for dysuria, flank pain, frequency, genital sores, hematuria. Musculoskeletal: + for impaired balance Negative for back pain, joint swelling, neck pain and neck stiffness. Skin: Negative for color change, rash and wound. Neurological: + for lightheadedness. Negative for dizziness, syncope, numbness and headaches.    Psychiatric/Behavioral: Negative for agitation, confusion, decreased concentration, dysphoric mood, hallucinations, sleep disturbance and suicidal ideas. The patient is not nervous/anxious and is not hyperactive. Physical Exam   Vitals:    04/25/22 2228   BP: (!) 142/86   Pulse: 67   Resp: 17   Temp:    SpO2: 98%       Constitutional:       General:  Not in acute distress. Appearance: Not ill-appearing, toxic-appearing or diaphoretic. HENT:      Head: Normocephalic and atraumatic. Right Ear: Tympanic membrane, ear canal and external ear normal. There is no impacted cerumen. Left Ear: Tympanic membrane, ear canal and external ear normal. There is no impacted cerumen. Nose: Nose normal. No congestion or rhinorrhea. Mouth/Throat:      Mouth: Mucous membranes are moist.      Pharynx: Oropharynx is clear. No oropharyngeal exudate or posterior oropharyngeal erythema. Eyes:      General: No scleral icterus. Right eye: No discharge. Left eye: No discharge. Extraocular Movements: Extraocular movements intact. Conjunctiva/sclera: Conjunctivae normal.      Pupils: Pupils are equal, round, and reactive to light. Neck:      Vascular: No carotid bruit. Cardiovascular:      Rate and Rhythm: Normal rate and regular rhythm. Pulses: Normal pulses. Heart sounds: Normal heart sounds. No murmur heard. No friction rub. No gallop. Pulmonary:      Effort: Pulmonary effort is normal. No respiratory distress. Breath sounds: No stridor. No wheezing, rhonchi or rales. Chest:      Chest wall: No tenderness. Abdominal:      General: Abdomen is flat. Bowel sounds are normal. There is no distension. Palpations: Abdomen is soft. Tenderness: There is no abdominal tenderness. There is no right CVA tenderness, left CVA tenderness, guarding or rebound. Hernia: No hernia is present. Musculoskeletal:         General: No swelling, tenderness, deformity or signs of injury. Normal range of motion. Cervical back: Normal range of motion.  No rigidity or tenderness. Left lower leg: No edema. Lymphadenopathy:      Cervical: No cervical adenopathy. Skin:     General: Skin is warm. Coloration: Skin is not jaundiced or pale. Findings: No bruising, erythema, lesion or rash. Neurological:      General: No focal deficit present. Mental Status: Alert and oriented to person, place, and time. Cranial Nerves: No cranial nerve deficit. Sensory: No sensory deficit. Motor: No weakness. Gait: Gait normal.   Psychiatric:         Mood and Affect: Mood normal.         Behavior: Behavior normal.         Thought Content: Thought content normal.         Judgment: Judgment normal.       Procedures   NA    [facility]     Stuart Gadiel 51 y.o.  male with past medical history of stroke in 2020 and atrial fibrillation presents to the ED complaining of double vision and lightheadedness. Stroke alert activated as patient has history of stroke which presented with similar symptoms. In the ED, CBC, CMP, troponin, unremarkable. Head CT wo contrast showed no acute abnormalities. Chest xray showed no acute abnormalities. CTA head and neck w contrast showed no acute abnormalities. Stroke team consulted and recommended admission. Patient admitted for further evaluation and management. Final Impression    1. Diplopia    2. Difficulty balancing        Blood pressure (!) 142/86, pulse 67, temperature 98.7 °F (37.1 °C), temperature source Oral, resp. rate 17, height 5' 11\" (1.803 m), weight (!) 308 lb 3.2 oz (139.8 kg), SpO2 98 %.     Labs  Results for orders placed or performed during the hospital encounter of 04/25/22   CBC with Auto Differential   Result Value Ref Range    WBC 8.1 4.0 - 11.0 K/uL    RBC 4.88 4.20 - 5.90 M/uL    Hemoglobin 15.6 13.5 - 17.5 g/dL    Hematocrit 45.0 40.5 - 52.5 %    MCV 92.3 80.0 - 100.0 fL    MCH 31.9 26.0 - 34.0 pg    MCHC 34.6 31.0 - 36.0 g/dL    RDW 12.9 12.4 - 15.4 %    Platelets 522 341 - 373 K/uL    MPV 9.3 5.0 - 10.5 fL    Neutrophils % 39.5 %    Lymphocytes % 45.9 %    Monocytes % 10.3 %    Eosinophils % 3.5 %    Basophils % 0.8 %    Neutrophils Absolute 3.2 1.7 - 7.7 K/uL    Lymphocytes Absolute 3.7 1.0 - 5.1 K/uL    Monocytes Absolute 0.8 0.0 - 1.3 K/uL    Eosinophils Absolute 0.3 0.0 - 0.6 K/uL    Basophils Absolute 0.1 0.0 - 0.2 K/uL   Comprehensive Metabolic Panel w/ Reflex to MG   Result Value Ref Range    Sodium 140 136 - 145 mmol/L    Potassium reflex Magnesium 3.9 3.5 - 5.1 mmol/L    Chloride 104 99 - 110 mmol/L    CO2 25 21 - 32 mmol/L    Anion Gap 11 3 - 16    Glucose 140 (H) 70 - 99 mg/dL    BUN 14 7 - 20 mg/dL    CREATININE 0.9 0.9 - 1.3 mg/dL    GFR Non-African American >60 >60    GFR African American >60 >60    Calcium 9.8 8.3 - 10.6 mg/dL    Total Protein 6.8 6.4 - 8.2 g/dL    Albumin 4.4 3.4 - 5.0 g/dL    Albumin/Globulin Ratio 1.8 1.1 - 2.2    Total Bilirubin 0.4 0.0 - 1.0 mg/dL    Alkaline Phosphatase 87 40 - 129 U/L    ALT 28 10 - 40 U/L    AST 27 15 - 37 U/L   Troponin   Result Value Ref Range    Troponin <0.01 <0.01 ng/mL   Protime-INR   Result Value Ref Range    Protime 13.5 (H) 9.9 - 12.7 sec    INR 1.19 (H) 0.88 - 1.12     Radiology  CHEST XRAY 4/25/2022:  No acute process. CT head wo contrast:  No acute intracranial abnormality.       The findings were sent to the Radiology Results [address] at 8:59   pm on 4/25/2022 to be communicated to a licensed caregiver.      CTA head and neck w contrast:   No large vessel occlusion in the head or neck.       2.  Minimal atherosclerotic disease.       3.  Focal 2 mm outpouching in the region of the anterior communicating artery   is similar to MRA head done August 29, 2017 and could represent a small   A-comm aneurysm versus artifact.       4.  Multiple mildly prominent bilateral cervical lymph nodes are nonspecific   and may be reactive.  Underlying neoplastic process such as lymphoma is   difficult to exclude in the appropriate clinical setting.       This scan was analyzed using Viz.  contact LVO. Identification of suspected   findings is not for diagnostic use beyond notification. Viz LVO is limited to   analysis of imaging data and should not be used in-lieu of full patient   evaluation or relied upon to make or confirm diagnosis. EKG Interpretation. Rhythm: Normal sinus rhythm with a rate of 65  Axis is   Normal  QTc is  normal  Intervals and Durations are unremarkable. No specific ST-T wave changes appreciated. No evidence of acute ischemia.    No significant change from prior EKG dated August 17, 2021     EKG 12 Lead   Result Value Ref Range    Ventricular Rate 65 BPM    Atrial Rate 65 BPM    P-R Interval 188 ms    QRS Duration 104 ms    Q-T Interval 432 ms    QTc Calculation (Bazett) 449 ms    P Axis 11 degrees    R Axis -18 degrees    T Axis -6 degrees    Diagnosis       Normal sinus rhythmNormal ECGWhen compared with ECG of 21-MAR-2021 20:43,Nonspecific T wave abnormality, worse in Inferior leads              LUC/ Darryl Turner, [place]  Resident  04/25/22 [id number]       Kacy Ingram MD  04/25/22 [id number]

## 2022-04-26 NOTE — CONSULTS
Clinical Pharmacy Consult Note  IV fluids in 0.9% Sodium Chloride    Pharmacy has been asked by Dr. Willis Alba to adjust all drips to normal saline as appropriate based on compatibility to avoid fluid shifts since D5 is osmotically active. The following intermittent IV drips/infusions have been adjusted to saline: The following medications must remain in D5W due to incompatibility with normal saline:  Amphotericin  Mycophenolate  Nitroprusside  Penicillin G Potassium    [facility] will follow daily to ensure all new IVPBs + drips are in NS. Please call with questions.     Kristine Bruno PharmKYLAH    4/25/2022 11:43 PM

## 2022-04-26 NOTE — DISCHARGE SUMMARY
Hospitalist Discharge Summary    Patient ID:  Bonnie Roldan  [id number]  89 y.o.  1975    Admit date: 4/25/2022    Discharge date: 4/26/2022    Disposition: home    Admission Diagnoses:   Patient Active Problem List   Diagnosis    SVT (supraventricular tachycardia) ([address]    Tobacco abuse    Essential hypertension    Stage 2 chronic kidney disease    Atrial flutter, paroxysmal ([address]    Polycystic kidney    Sleep apnea-like behavior    [facility]    Microscopic hematuria: with atypical cytology -    History of arterial ischemic stroke    Diplopia       Discharge Diagnoses: Principal Problem:    Diplopia  Active Problems:    Tobacco abuse    Essential hypertension    Atrial flutter, paroxysmal (HCC)    History of arterial ischemic stroke  Resolved Problems:    * No resolved hospital problems. *      Code Status:  Full Code    Condition:  Stable    Discharge Diet: Diet:  ADULT DIET; Regular  Diet NPO    PCP to do list:  Referral for outpatient sleep study to evaluate for sleep apnea - pt needs to see ophthalmology ASAP    Hospital Course: 52yo WM with PMHx sig for paroxysmal afib, HTN, secondary hypercoagulable state and hx of  L optic CVA presents with vision changed. He stated that he was dizzy and having double vision. He had [id number] diplopia that persisted upon returning home. He is currently on eliquis for paroxysmal afib. Last eye exam was 6 months ago at [facility]. Pt is profoundly claustrophobic and failed sedation attempt with IV ativan in the past.  He typically goes to an open MRI. Consulted neuro for their opinion and they recommended IV ativan which he has already failed. After review by Dr. Shaan Israel - decision made to send pt to his ophthalmologist at [facility]. His current medication regimen would not change even if new mild CVA. So will cancel MRI in the morning and patient can be discharged today.   The risk for an MRI under sedation would outweigh the benefit since his medications would not change. He does need sleep apnea evaluation with PCP. Discharge Medications:   Current Discharge Medication List        Current Discharge Medication List        Current Discharge Medication List      CONTINUE these medications which have NOT CHANGED    Details   apixaban (ELIQUIS) 5 MG TABS tablet Take 1 tablet by mouth 2 times daily  Qty: 180 tablet, Refills: 3    Associated Diagnoses: Typical atrial flutter (HCC)      metoprolol succinate (TOPROL XL) 25 MG extended release tablet Take 1 tablet by mouth daily  Qty: 90 tablet, Refills: 3    Associated Diagnoses: Typical atrial flutter (HCC)      losartan (COZAAR) 25 MG tablet Take 1 tablet by mouth daily  Qty: 90 tablet, Refills: 3    Associated Diagnoses: Essential hypertension      atorvastatin (LIPITOR) 40 MG tablet Take 1 tablet by mouth nightly  Qty: 90 tablet, Refills: 3      ketoconazole (NIZORAL) 2 % cream Apply topically daily. Qty: 30 g, Refills: 1    Associated Diagnoses: Intertrigo      aspirin 81 MG chewable tablet Take 81 mg by mouth daily           Current Discharge Medication List              Procedures: none    Assessment on Discharge: Stable, improved     Discharge ROS:  A complete review of systems was asked and negative except for denies pain or dizziness currently . Discharge Exam:  BP (!) 161/66   Pulse 65   Temp 97.8 °F (36.6 °C) (Oral)   Resp 16   Ht 5' 11\" (1.803 m)   Wt (!) 308 lb 3.2 oz (139.8 kg)   SpO2 95%   BMI 42.99 kg/m²     Gen: obese  HEENT: NC/AT, moist mucous membranes, no oropharyngeal erythema or exudate  Neck: supple, trachea midline, no anterior cervical or SC LAD  Heart:  Normal s1/s2, RRR, no murmurs, gallops, or rubs.  no leg edema  Lungs:  clear bilaterally, no wheeze, no rales, no rhonchi, no crackles, no use of accessory muscles  Abd: bowel sounds present, soft, nontender, nondistended, no masses  Extrem:  No clubbing, cyanosis,  no edema  Skin: no rashes or lesions  Psych: A & O x3  Neuro: grossly intact, moves all four extremities. Pertinent Studies During Hospital Stay:  Radiology:  CT HEAD WO CONTRAST    Result Date: 4/25/2022  EXAMINATION: CT OF THE HEAD WITHOUT CONTRAST  4/25/2022 8:34 pm TECHNIQUE: CT of the head was performed without the administration of intravenous contrast. Dose modulation, iterative reconstruction, and/or weight based adjustment of the mA/kV was utilized to reduce the radiation dose to as low as reasonably achievable. COMPARISON: None. HISTORY: ORDERING SYSTEM PROVIDED HISTORY: Stroke Symptoms TECHNOLOGIST PROVIDED HISTORY: Has a \"code stroke\" or \"stroke alert\" been called? ->Yes Reason for exam:->Stroke Symptoms Decision Support Exception - unselect if not a suspected or confirmed emergency medical condition->Emergency Medical Condition (MA) Reason for Exam: dizziness blurred vision FINDINGS: BRAIN/VENTRICLES: There is no acute intracranial hemorrhage, mass effect or midline shift. No abnormal extra-axial fluid collection. The gray-white differentiation is maintained without evidence of an acute infarct. There is no evidence of hydrocephalus. ORBITS: The visualized portion of the orbits demonstrate no acute abnormality. SINUSES: Minimal scattered paranasal sinus mucosal thickening. The bilateral mastoid air cells are clear. SOFT TISSUES/SKULL:  No acute abnormality of the visualized skull or soft tissues. No acute intracranial abnormality. The findings were sent to the Radiology Results [address] at 8:59 pm on 4/25/2022 to be communicated to a licensed caregiver. XR CHEST PORTABLE    Result Date: 4/25/2022  EXAMINATION: ONE XRAY VIEW OF THE CHEST 4/25/2022 8:53 pm COMPARISON: Chest radiograph and CT chest angiogram 03/21/2021.  HISTORY: ORDERING SYSTEM PROVIDED HISTORY: stroke symptoms TECHNOLOGIST PROVIDED HISTORY: Reason for exam:->stroke symptoms Reason for Exam: code stroke FINDINGS: The lungs are without acute focal process. There is no effusion or pneumothorax. The cardiomediastinal silhouette is stable. The osseous structures are stable. No acute process. CTA HEAD NECK W CONTRAST    Result Date: 4/25/2022  EXAMINATION: CTA OF THE HEAD AND NECK WITH CONTRAST 4/25/2022 8:46 pm: TECHNIQUE: CTA of the head and neck was performed with the administration of intravenous contrast. Multiplanar reformatted images are provided for review. MIP images are provided for review. Stenosis of the internal carotid arteries measured using NASCET criteria. Dose modulation, iterative reconstruction, and/or weight based adjustment of the mA/kV was utilized to reduce the radiation dose to as low as reasonably achievable. COMPARISON: Noncontrast CT head done same day. MRA head done 08/29/2017. HISTORY: ORDERING SYSTEM PROVIDED HISTORY: Stroke Symptoms TECHNOLOGIST PROVIDED HISTORY: Has a \"code stroke\" or \"stroke alert\" been called? ->Yes Reason for exam:->Stroke Symptoms Decision Support Exception - unselect if not a suspected or confirmed emergency medical condition->Emergency Medical Condition (MA) Reason for Exam: dizziness all day FINDINGS: CTA NECK: AORTIC ARCH/ARCH VESSELS: No dissection or arterial injury. No significant stenosis of the brachiocephalic or subclavian arteries. Mild atherosclerosis in the visualized thoracic aorta. CAROTID ARTERIES: No dissection, arterial injury, or hemodynamically significant stenosis by NASCET criteria. Mild bilateral carotid bulb atherosclerotic plaque. VERTEBRAL ARTERIES: No dissection, arterial injury, or significant stenosis. SOFT TISSUES: The lung apices are clear. Multiple mildly prominent bilateral cervical lymph nodes are nonspecific and may be reactive. The larynx and pharynx are unremarkable. No acute abnormality of the salivary and thyroid glands. BONES: No acute osseous abnormality.  CTA HEAD: ANTERIOR CIRCULATION: No significant stenosis of the intracranial internal carotid, anterior cerebral, or middle cerebral arteries. Subtle 2 mm outpouching in the region and the anterior communicating artery. POSTERIOR CIRCULATION: No significant stenosis of the vertebral, basilar, or posterior cerebral arteries. No aneurysm. OTHER: No dural venous sinus thrombosis on this non-dedicated study. BRAIN: No mass effect or midline shift. No extra-axial fluid collection. The gray-white differentiation is maintained. 1.  No large vessel occlusion in the head or neck. 2.  Minimal atherosclerotic disease. 3.  Focal 2 mm outpouching in the region of the anterior communicating artery is similar to MRA head done August 29, 2017 and could represent a small A-comm aneurysm versus artifact. 4.  Multiple mildly prominent bilateral cervical lymph nodes are nonspecific and may be reactive. Underlying neoplastic process such as lymphoma is difficult to exclude in the appropriate clinical setting. This scan was analyzed using [employer] contact LVO. Identification of suspected findings is not for diagnostic use beyond notification. [employer] LVO is limited to analysis of imaging data and should not be used in-lieu of full patient evaluation or relied upon to make or confirm diagnosis.            Last Labs on Discharge:     Recent Results (from the past 24 hour(s))   POCT Glucose    Collection Time: 04/25/22  8:30 PM   Result Value Ref Range    POC Glucose 117 (H) 70 - 99 mg/dl    Performed on ACCU-CHEK    CBC with Auto Differential    Collection Time: 04/25/22  8:35 PM   Result Value Ref Range    WBC 8.1 4.0 - 11.0 K/uL    RBC 4.88 4.20 - 5.90 M/uL    Hemoglobin 15.6 13.5 - 17.5 g/dL    Hematocrit 45.0 40.5 - 52.5 %    MCV 92.3 80.0 - 100.0 fL    MCH 31.9 26.0 - 34.0 pg    MCHC 34.6 31.0 - 36.0 g/dL    RDW 12.9 12.4 - 15.4 %    Platelets 731 364 - 572 K/uL    MPV 9.3 5.0 - 10.5 fL    Neutrophils % 39.5 %    Lymphocytes % 45.9 %    Monocytes % 10.3 %    Eosinophils % 3.5 %    Basophils % 0.8 %    Neutrophils Absolute 3.2 1.7 - 7.7 K/uL    Lymphocytes Absolute 3.7 1.0 - 5.1 K/uL    Monocytes Absolute 0.8 0.0 - 1.3 K/uL    Eosinophils Absolute 0.3 0.0 - 0.6 K/uL    Basophils Absolute 0.1 0.0 - 0.2 K/uL   Comprehensive Metabolic Panel w/ Reflex to MG    Collection Time: 04/25/22  8:35 PM   Result Value Ref Range    Sodium 140 136 - 145 mmol/L    Potassium reflex Magnesium 3.9 3.5 - 5.1 mmol/L    Chloride 104 99 - 110 mmol/L    CO2 25 21 - 32 mmol/L    Anion Gap 11 3 - 16    Glucose 140 (H) 70 - 99 mg/dL    BUN 14 7 - 20 mg/dL    CREATININE 0.9 0.9 - 1.3 mg/dL    GFR Non-African American >60 >60    GFR African American >60 >60    Calcium 9.8 8.3 - 10.6 mg/dL    Total Protein 6.8 6.4 - 8.2 g/dL    Albumin 4.4 3.4 - 5.0 g/dL    Albumin/Globulin Ratio 1.8 1.1 - 2.2    Total Bilirubin 0.4 0.0 - 1.0 mg/dL    Alkaline Phosphatase 87 40 - 129 U/L    ALT 28 10 - 40 U/L    AST 27 15 - 37 U/L   Troponin    Collection Time: 04/25/22  8:35 PM   Result Value Ref Range    Troponin <0.01 <0.01 ng/mL   Protime-INR    Collection Time: 04/25/22  8:35 PM   Result Value Ref Range    Protime 13.5 (H) 9.9 - 12.7 sec    INR 1.19 (H) 0.88 - 1.12   EKG 12 Lead    Collection Time: 04/25/22  9:10 PM   Result Value Ref Range    Ventricular Rate 65 BPM    Atrial Rate 65 BPM    P-R Interval 188 ms    QRS Duration 104 ms    Q-T Interval 432 ms    QTc Calculation (Bazett) 449 ms    P Axis 11 degrees    R Axis -18 degrees    T Axis -6 degrees    Diagnosis       Normal sinus rhythmIntra-ventricular conduction delayConfirmed by MICHELET GENAO MD ([id number]) on 4/26/2022 11:48:12 AM   Basic Metabolic Panel w/ Reflex to MG    Collection Time: 04/26/22  6:11 AM   Result Value Ref Range    Sodium 141 136 - 145 mmol/L    Potassium reflex Magnesium 4.2 3.5 - 5.1 mmol/L    Chloride 106 99 - 110 mmol/L    CO2 24 21 - 32 mmol/L    Anion Gap 11 3 - 16    Glucose 99 70 - 99 mg/dL    BUN 10 7 - 20 mg/dL    CREATININE 0.8 (L) 0.9 - 1.3 mg/dL    GFR Non-African American >60 >60    GFR African American >60 >60    Calcium 9.7 8.3 - 10.6 mg/dL   Lipid panel - fasting    Collection Time: 04/26/22  6:11 AM   Result Value Ref Range    Cholesterol, Total 88 0 - 199 mg/dL    Triglycerides 150 0 - 150 mg/dL    HDL 26 (L) 40 - 60 mg/dL    LDL Calculated 32 <100 mg/dL    VLDL Cholesterol Calculated 30 Not Established mg/dL   Hemoglobin A1c    Collection Time: 04/26/22  6:11 AM   Result Value Ref Range    Hemoglobin A1C 5.4 See comment %    eAG 108.3 mg/dL   CBC    Collection Time: 04/26/22  6:11 AM   Result Value Ref Range    WBC 7.8 4.0 - 11.0 K/uL    RBC 4.84 4.20 - 5.90 M/uL    Hemoglobin 15.3 13.5 - 17.5 g/dL    Hematocrit 44.5 40.5 - 52.5 %    MCV 91.8 80.0 - 100.0 fL    MCH 31.6 26.0 - 34.0 pg    MCHC 34.4 31.0 - 36.0 g/dL    RDW 12.8 12.4 - 15.4 %    Platelets 761 219 - 775 K/uL    MPV 9.5 5.0 - 10.5 fL         Follow up: with Veronika Blackmon MD    Note that 32 minutes was spent in preparing discharge papers, discussing discharge with patient, medication review, etc.    Thank you Veronika Blackmon MD for the opportunity to be involved in this patient's care. If you have any questions or concerns please feel free to contact me at [phone].       Electronically signed by Emeka Brito MD on 4/26/2022 at 4:09 PM

## 2022-04-27 ENCOUNTER — CARE COORDINATION (OUTPATIENT)
Dept: CASE MANAGEMENT | Age: 47
End: 2022-04-27

## 2022-04-27 ENCOUNTER — TELEPHONE (OUTPATIENT)
Dept: PRIMARY CARE CLINIC | Age: 47
End: 2022-04-27

## 2022-04-27 DIAGNOSIS — I10 ESSENTIAL HYPERTENSION: Primary | ICD-10-CM

## 2022-04-27 NOTE — TELEPHONE ENCOUNTER
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

## 2022-04-27 NOTE — CARE COORDINATION
[facility]    Call within 2 business days of discharge: Yes    Patient: Theodore Deleon Patient : 1975   MRN: [id number]  Reason for Admission: diplopia  Discharge Date: 22 RARS: No data recorded    Last Discharge [facility]       Complaint Diagnosis Description Type Department Provider    22 Dizziness; Diplopia Diplopia . .. ED to Hosp-Admission (Discharged) (ADMITTED) MHAZ C5 Rhoda Apley, MD; Jesus Cueto ... Spoke with: wife, Estelle Meeks  HIPAA form verified in system    Facility: [facility]    Non-face-to-face services provided:  Obtained and reviewed discharge summary and/or continuity of care documents     Challenges to be reviewed by the provider   Additional needs identified to be addressed with provider No  PCP office already reached out to patient in regards to follow up visit       Method of communication with provider : none    Advance Care Planning:   Does patient have an Advance Directive:  not on file. Was this a readmission? No  Patient stated reason for admission: double vision and dizziness  Patients top risk factors for readmission:   PMH of CVA in 2020, Afib on eliquis, HTN    Care Transition Nurse (CTN) contacted the family by telephone to perform post hospital discharge assessment. Verified name and  with family as identifiers. Provided introduction to self, and explanation of the CTN role. CTN reviewed discharge instructions, medical action plan and red flags with family who verbalized understanding. Family given an opportunity to ask questions and does not have any further questions or concerns at this time. Were discharge instructions available to patient? Yes. Reviewed appropriate site of care based on symptoms and resources available to patient including: PCP  Specialist. The family agrees to contact the PCP office for questions related to their healthcare.      Medication reconciliation was performed with family, who verbalizes understanding of administration of home medications. COVID Risk Education    Educated patient about risk for severe COVID-19 due to risk factors according to CDC guidelines. Education provided on COVID-19 vaccination as appropriate. Discussed exposure protocols and quarantine with CDC Guidelines. Family was given an opportunity to verbalize any questions and concerns and agrees to contact CTN or health care provider for questions related to their healthcare. Was patient discharged with a pulse oximeter? No Discussed and confirmed pulse oximeter discharge instructions and when to notify provider or seek emergency care. Spoke to patient's wife, Suyapa Keith. HIPAA form verified in system. Wife stated that patient was on the other phone with MD office. Discussed recent hospitalization and d/c summary available. Wife stated that patient was familiar with medications and med requisition completed. PCP office reached out to patient this morning to get follow up visit scheduled. Patient was talking to [id number] for follow up visit and treatment plan. Reviewed notes and wife stated understanding. Denied any acute needs at present time. declined follow up transition calls. Educated on the use of urgent care or physicians 24 hr access line if assistance is needed after hours. CTN provided contact information for future needs. No further follow-up call identified based on severity of symptoms and risk factors.       Care Transitions 24 Hour Call    Do you have a copy of your discharge instructions?: Yes  Do you have all of your prescriptions and are they filled?: Yes  Have you been contacted by a [facility]?: No  Have you scheduled your follow up appointment?: Yes  How are you going to get to your appointment?: Car - family or friend to transport  Do you feel like you have everything you need to keep you well at home?: Yes  Care Transitions Interventions         Follow Up  No future appointments.     Елена Germain RN

## 2022-04-28 ENCOUNTER — TELEPHONE (OUTPATIENT)
Dept: PRIMARY CARE CLINIC | Age: 47
End: 2022-04-28

## 2022-04-28 NOTE — TELEPHONE ENCOUNTER
Called pt spoke with wife states he is at work and will give us a call back to schedule an appointment

## 2022-06-08 ENCOUNTER — TELEPHONE (OUTPATIENT)
Dept: PRIMARY CARE CLINIC | Age: 47
End: 2022-06-08

## 2022-06-08 ENCOUNTER — PATIENT MESSAGE (OUTPATIENT)
Dept: PRIMARY CARE CLINIC | Age: 47
End: 2022-06-08

## 2022-06-08 NOTE — TELEPHONE ENCOUNTER
From: LISSETTE Helton V  To: Zay Backers  Sent: 6/8/2022 10:42 AM EDT  Subject: letter    Silva Mya Morning we did receive a letter on June 1st, What exactly was you needed.

## 2022-06-08 NOTE — TELEPHONE ENCOUNTER
Called pt no answer left detailed message, looked under media it shows we received letter from the neurologist   June 1. Not sure what he needs ?

## 2022-06-08 NOTE — TELEPHONE ENCOUNTER
----- Message from Rachel Arredondo sent at 6/8/2022  9:30 AM EDT -----  Subject: Message to Provider    QUESTIONS  Information for Provider? Wife calling as he went to see a neurologist,   letter was being sent to Dr. Bhavik Castillo as pt needs letter for work, calling   to see if letter has been received by office. Please with information.  ---------------------------------------------------------------------------  --------------  CALL BACK INFO  What is the best way for the office to contact you? OK to leave message on   voicemail  Preferred Call Back Phone Number?  [phone]  ---------------------------------------------------------------------------  --------------  SCRIPT ANSWERS  undefined

## 2022-10-20 RX ORDER — ATORVASTATIN CALCIUM 40 MG/1
TABLET, FILM COATED ORAL
Qty: 30 TABLET | Refills: 0 | Status: SHIPPED | OUTPATIENT
Start: 2022-10-20

## 2022-10-20 NOTE — TELEPHONE ENCOUNTER
Medication:   Requested Prescriptions     Pending Prescriptions Disp Refills    atorvastatin (LIPITOR) 40 MG tablet [Pharmacy Med Name: ATORVASTATIN 40 MG TABLET] 30 tablet      Sig: TAKE ONE TABLET BY MOUTH ONCE NIGHTLY       Last Filled:      Patient Phone Number: [phone] (home)     Last appt: 3/24/2022   Next appt: Visit date not found      Return in about 6 months (around 4/26/2022  Last PSA: No results found for: PSA

## 2022-11-07 ENCOUNTER — OFFICE VISIT (OUTPATIENT)
Dept: PRIMARY CARE CLINIC | Age: 47
End: 2022-11-07
Payer: COMMERCIAL

## 2022-11-07 VITALS
DIASTOLIC BLOOD PRESSURE: 70 MMHG | BODY MASS INDEX: 42.54 KG/M2 | OXYGEN SATURATION: 96 % | WEIGHT: 305 LBS | TEMPERATURE: 97.2 F | HEART RATE: 64 BPM | SYSTOLIC BLOOD PRESSURE: 136 MMHG

## 2022-11-07 DIAGNOSIS — I10 ESSENTIAL HYPERTENSION: ICD-10-CM

## 2022-11-07 DIAGNOSIS — Z23 NEED FOR VACCINATION: ICD-10-CM

## 2022-11-07 DIAGNOSIS — I63.9 CEREBROVASCULAR ACCIDENT (CVA), UNSPECIFIED MECHANISM (HCC): Primary | ICD-10-CM

## 2022-11-07 DIAGNOSIS — E66.01 CLASS 3 SEVERE OBESITY DUE TO EXCESS CALORIES WITHOUT SERIOUS COMORBIDITY WITH BODY MASS INDEX (BMI) OF 40.0 TO 44.9 IN ADULT (HCC): ICD-10-CM

## 2022-11-07 DIAGNOSIS — Z72.0 TOBACCO ABUSE: ICD-10-CM

## 2022-11-07 PROCEDURE — 99214 OFFICE O/P EST MOD 30 MIN: CPT | Performed by: FAMILY MEDICINE

## 2022-11-07 PROCEDURE — 3074F SYST BP LT 130 MM HG: CPT | Performed by: FAMILY MEDICINE

## 2022-11-07 PROCEDURE — 3078F DIAST BP <80 MM HG: CPT | Performed by: FAMILY MEDICINE

## 2022-11-07 RX ORDER — LOSARTAN POTASSIUM 100 MG/1
100 TABLET ORAL DAILY
Qty: 90 TABLET | Refills: 1 | Status: SHIPPED | OUTPATIENT
Start: 2022-11-07

## 2022-11-07 SDOH — ECONOMIC STABILITY: FOOD INSECURITY: WITHIN THE PAST 12 MONTHS, YOU WORRIED THAT YOUR FOOD WOULD RUN OUT BEFORE YOU GOT MONEY TO BUY MORE.: NEVER TRUE

## 2022-11-07 SDOH — ECONOMIC STABILITY: FOOD INSECURITY: WITHIN THE PAST 12 MONTHS, THE FOOD YOU BOUGHT JUST DIDN'T LAST AND YOU DIDN'T HAVE MONEY TO GET MORE.: NEVER TRUE

## 2022-11-07 ASSESSMENT — SOCIAL DETERMINANTS OF HEALTH (SDOH): HOW HARD IS IT FOR YOU TO PAY FOR THE VERY BASICS LIKE FOOD, HOUSING, MEDICAL CARE, AND HEATING?: NOT HARD AT ALL

## 2022-11-07 NOTE — PROGRESS NOTES
Chief Complaint   Patient presents with    Hypertension    Leg Swelling       HPI: Nat Culver  presents for evaluation and management of stroke, hypertension, tobacco abuse and obesity. Will he notes that he has not had any new symptoms of weakness or numbness. He has been compliant with his Eliquis given his history of atrial flutter. Notes no easy bleeding or bruising but has had some leg swelling. He has been taking and tolerating his blood pressure medications without lightheadedness or dizziness. He notes that he has not really been working on weight loss and continues to smoke. He has tried quitting but he is really struggling with this           Review of Systems    Allergies   Allergen Reactions    Ibuprofen      kidneys    Vicodin [Hydrocodone-Acetaminophen]     Codeine Rash     New Prescriptions    LOSARTAN (COZAAR) 100 MG TABLET    Take 1 tablet by mouth daily     Current Outpatient Medications   Medication Sig Dispense Refill    losartan (COZAAR) 100 MG tablet Take 1 tablet by mouth daily 90 tablet 1    atorvastatin (LIPITOR) 40 MG tablet TAKE ONE TABLET BY MOUTH ONCE NIGHTLY 30 tablet 0    apixaban (ELIQUIS) 5 MG TABS tablet Take 1 tablet by mouth 2 times daily 180 tablet 3    metoprolol succinate (TOPROL XL) 25 MG extended release tablet Take 1 tablet by mouth daily 90 tablet 3    ketoconazole (NIZORAL) 2 % cream Apply topically daily. 30 g 1    aspirin 81 MG chewable tablet Take 81 mg by mouth daily       No current facility-administered medications for this visit.        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    Polycystic kidney     Dr. Miles Benavidez    PS[facility]oxysmal supraventricular tachycardia) [facility]     Stage 2 chronic kidney disease 08/12/2017 Tubular adenoma of colon 09/21/2020    Needs 3 year follow up. Objective   /70   Pulse 64   Temp 97.2 °F (36.2 °C)   Wt (!) 305 lb (138.3 kg)   SpO2 96%   BMI 42.54 kg/m²   Wt Readings from Last 3 Encounters:   11/07/22 (!) 305 lb (138.3 kg)   04/25/22 (!) 308 lb 3.2 oz (139.8 kg)   03/24/22 (!) 302 lb 6.4 oz (137.2 kg)       Physical Exam  Constitutional:       Appearance: He is well-developed. He is obese. Cardiovascular:      Rate and Rhythm: Normal rate and regular rhythm. Heart sounds: No murmur heard. No friction rub. No gallop. Pulmonary:      Effort: Pulmonary effort is normal.      Breath sounds: Normal breath sounds. No wheezing or rales. Abdominal:      General: Bowel sounds are normal. There is no distension. Palpations: Abdomen is soft. There is no mass. Tenderness: There is no abdominal tenderness. Skin:     General: Skin is warm and dry. Findings: No rash.          Chemistry        Component Value Date/Time     04/26/2022 0611    K 4.2 04/26/2022 0611     04/26/2022 0611    CO2 24 04/26/2022 0611    BUN 10 04/26/2022 0611    CREATININE 0.8 (L) 04/26/2022 0611        Component Value Date/Time    CALCIUM 9.7 04/26/2022 0611    ALKPHOS 87 04/25/2022 2035    AST 27 04/25/2022 2035    ALT 28 04/25/2022 2035    BILITOT 0.4 04/25/2022 2035          Lab Results   Component Value Date    WBC 7.8 04/26/2022    HGB 15.3 04/26/2022    HCT 44.5 04/26/2022    MCV 91.8 04/26/2022     04/26/2022     Lab Results   Component Value Date    LABA1C 5.4 04/26/2022     Lab Results   Component Value Date    .3 04/26/2022     Lab Results   Component Value Date    LABA1C 5.4 04/26/2022     No components found for: CHLPL  Lab Results   Component Value Date    TRIG 150 04/26/2022    TRIG 151 (H) 08/13/2020    TRIG 232 (H) 08/15/2016     Lab Results   Component Value Date    HDL 26 (L) 04/26/2022    HDL 30 (L) 08/17/2021    HDL 35 (L) 08/13/2020     Lab Results   Component Value Date    LDLCALC 32 04/26/2022    LDLCALC 45 08/17/2021    LDLCALC 118 (H) 08/13/2020     Lab Results   Component Value Date    LABVLDL 30 04/26/2022    LABVLDL 24 08/17/2021    LABVLDL 30 08/13/2020         Assessment   Plan   1. Cerebrovascular accident (CVA), unspecified mechanism ([address]  Appears stable: Continue anticoagulation given history of a flutter and previous stroke. Manage risk factors to minimize recurrence    2. Essential hypertension  Marginal control: We will titrate up his losartan and follow-up in 6 months  - losartan (COZAAR) 100 MG tablet; Take 1 tablet by mouth daily  Dispense: 90 tablet; Refill: 1  - Comprehensive Metabolic Panel; Future    3. Class 3 severe obesity due to excess calories without serious comorbidity with body mass index (BMI) of 40.0 to 44.9 in adult [facility])  Counseled on diet and exercise. Motivational interviewing and removal of barriers for good habits. 4. Tobacco abuse  Counseled smoking cessation    5. Need for vaccination  Patient declined vaccination today        RTC Return in about 6 months (around 5/7/2023).

## 2022-11-08 DIAGNOSIS — I48.3 TYPICAL ATRIAL FLUTTER (HCC): ICD-10-CM

## 2022-11-08 LAB
A/G RATIO: 1.8 (ref 1.1–2.2)
ALBUMIN SERPL-MCNC: 4.2 G/DL (ref 3.4–5)
ALP BLD-CCNC: 81 U/L (ref 40–129)
ALT SERPL-CCNC: 35 U/L (ref 10–40)
ANION GAP SERPL CALCULATED.3IONS-SCNC: 14 MMOL/L (ref 3–16)
AST SERPL-CCNC: 26 U/L (ref 15–37)
BILIRUB SERPL-MCNC: 0.7 MG/DL (ref 0–1)
BUN BLDV-MCNC: 11 MG/DL (ref 7–20)
CALCIUM SERPL-MCNC: 9.4 MG/DL (ref 8.3–10.6)
CHLORIDE BLD-SCNC: 105 MMOL/L (ref 99–110)
CO2: 22 MMOL/L (ref 21–32)
CREAT SERPL-MCNC: 0.8 MG/DL (ref 0.9–1.3)
GFR SERPL CREATININE-BSD FRML MDRD: >60 ML/MIN/{1.73_M2}
GLUCOSE BLD-MCNC: 113 MG/DL (ref 70–99)
POTASSIUM SERPL-SCNC: 3.8 MMOL/L (ref 3.5–5.1)
SODIUM BLD-SCNC: 141 MMOL/L (ref 136–145)
TOTAL PROTEIN: 6.5 G/DL (ref 6.4–8.2)

## 2022-11-08 RX ORDER — APIXABAN 5 MG/1
TABLET, FILM COATED ORAL
Qty: 60 TABLET | Refills: 5 | Status: SHIPPED | OUTPATIENT
Start: 2022-11-08

## 2022-11-08 RX ORDER — METOPROLOL SUCCINATE 25 MG/1
TABLET, EXTENDED RELEASE ORAL
Qty: 30 TABLET | Refills: 5 | Status: SHIPPED | OUTPATIENT
Start: 2022-11-08

## 2022-11-08 NOTE — TELEPHONE ENCOUNTER
Medication:   Requested Prescriptions     Pending Prescriptions Disp Refills    metoprolol succinate (TOPROL XL) 25 MG extended release tablet [Pharmacy Med Name: METOPROLOL SUCC ER 25 MG TAB] 30 tablet      Sig: TAKE ONE TABLET BY MOUTH DAILY    ELIQUIS 5 MG TABS tablet [Pharmacy Med Name: Jad Light 5 MG TABLET] 60 tablet      Sig: TAKE ONE TABLET BY MOUTH TWICE A DAY       Last Filled:      Patient Phone Number: [phone] (home)     Last appt: 11/7/2022   Next appt: Visit date not found  Return in about 6 months (around 5/7/2023).   Last PSA: No results found for: PSA

## 2022-12-07 RX ORDER — ATORVASTATIN CALCIUM 40 MG/1
TABLET, FILM COATED ORAL
Qty: 30 TABLET | Refills: 0 | Status: SHIPPED | OUTPATIENT
Start: 2022-12-07

## 2022-12-07 NOTE — TELEPHONE ENCOUNTER
Medication:   Requested Prescriptions     Pending Prescriptions Disp Refills    atorvastatin (LIPITOR) 40 MG tablet [Pharmacy Med Name: ATORVASTATIN 40 MG TABLET] 30 tablet 0     Sig: TAKE ONE TABLET BY MOUTH ONCE NIGHTLY        Last Filled:  10/20/22    Patient Phone Number: [phone] (home)     Last appt: 11/7/2022 Return in about 6 months (around 5/7/2023). Next appt: Visit date not found    Last OARRS: No flowsheet data found.

## 2023-01-12 NOTE — TELEPHONE ENCOUNTER
Medication:   Requested Prescriptions     Pending Prescriptions Disp Refills    atorvastatin (LIPITOR) 40 MG tablet [Pharmacy Med Name: ATORVASTATIN 40 MG TABLET] 30 tablet 0     Sig: TAKE ONE TABLET BY MOUTH ONCE NIGHTLY       Last Filled:      Patient Phone Number: [phone] (home)     Last appt: 11/7/2022   Next appt: Visit date not found  Return in about 6 months (around 5/7/2023).   Last PSA: No results found for: PSA

## 2023-01-13 RX ORDER — ATORVASTATIN CALCIUM 40 MG/1
TABLET, FILM COATED ORAL
Qty: 30 TABLET | Refills: 11 | Status: SHIPPED | OUTPATIENT
Start: 2023-01-13

## 2023-03-13 DIAGNOSIS — I10 ESSENTIAL HYPERTENSION: ICD-10-CM

## 2023-03-13 RX ORDER — LOSARTAN POTASSIUM 100 MG/1
TABLET ORAL
Qty: 90 TABLET | Refills: 3 | Status: SHIPPED | OUTPATIENT
Start: 2023-03-13

## 2023-03-13 NOTE — TELEPHONE ENCOUNTER
Medication:   Requested Prescriptions     Pending Prescriptions Disp Refills    losartan (COZAAR) 100 MG tablet [Pharmacy Med Name: LOSARTAN POTASSIUM 100 MG TAB] 90 tablet 1     Sig: TAKE ONE TABLET BY MOUTH DAILY        Last Filled:  11/07/22    Patient Phone Number: [phone] (home)     Last appt: 11/7/2022 Return in about 6 months (around 5/7/2023). Next appt: Visit date not found    Last OARRS: No flowsheet data found.

## 2023-03-16 DIAGNOSIS — Z12.11 SCREEN FOR COLON CANCER: Primary | ICD-10-CM

## 2023-03-29 ENCOUNTER — HOSPITAL ENCOUNTER (EMERGENCY)
Age: 48
Discharge: HOME | End: 2023-03-29
Attending: STUDENT IN AN ORGANIZED HEALTH CARE EDUCATION/TRAINING PROGRAM
Payer: COMMERCIAL

## 2023-03-29 ENCOUNTER — APPOINTMENT (OUTPATIENT)
Dept: GENERAL RADIOLOGY | Age: 48
End: 2023-03-29
Payer: COMMERCIAL

## 2023-03-29 VITALS
DIASTOLIC BLOOD PRESSURE: 86 MMHG | HEART RATE: 60 BPM | RESPIRATION RATE: 18 BRPM | SYSTOLIC BLOOD PRESSURE: 158 MMHG | TEMPERATURE: 98.2 F | OXYGEN SATURATION: 98 %

## 2023-03-29 DIAGNOSIS — S93.402A SPRAIN OF LEFT ANKLE, UNSPECIFIED LIGAMENT, INITIAL ENCOUNTER: Primary | ICD-10-CM

## 2023-03-29 PROCEDURE — 73610 X-RAY EXAM OF ANKLE: CPT

## 2023-03-29 PROCEDURE — 73630 X-RAY EXAM OF FOOT: CPT

## 2023-03-29 PROCEDURE — 99283 EMERGENCY DEPT VISIT LOW MDM: CPT

## 2023-03-29 ASSESSMENT — PAIN SCALES - GENERAL: PAINLEVEL_OUTOF10: 6

## 2023-03-29 ASSESSMENT — PAIN DESCRIPTION - LOCATION: LOCATION: ANKLE

## 2023-03-29 ASSESSMENT — PAIN DESCRIPTION - ORIENTATION: ORIENTATION: LEFT

## 2023-03-29 ASSESSMENT — PAIN - FUNCTIONAL ASSESSMENT: PAIN_FUNCTIONAL_ASSESSMENT: 0-10

## 2023-03-30 NOTE — ED PROVIDER NOTES
contributed the majority of evaluation and treatment of emergent care for this encounter. Total critical care time is 0 minutes, which excludes separately billable procedures and updating family. Time spent is specifically for management of the presenting complaint and symptoms initially, direct bedside care, reevaluation, review of records, and consultation. There was a high probability of clinically significant life-threatening deterioration in the patient's condition, which required my urgent intervention. This chart was generated in part by using Dragon Dictation system and may contain errors related to that system including errors in grammar, punctuation, and spelling, as well as words and phrases that may be inappropriate. If there are any questions or concerns please feel free to contact the dictating provider for clarification.      Lauren Garland MD  [address] [facility] Jhonatan Chaney MD  03/30/23 4714

## 2023-04-20 DIAGNOSIS — Z12.11 SCREEN FOR COLON CANCER: Primary | ICD-10-CM

## 2023-07-10 DIAGNOSIS — I48.3 TYPICAL ATRIAL FLUTTER (HCC): ICD-10-CM

## 2023-07-10 RX ORDER — METOPROLOL SUCCINATE 25 MG/1
TABLET, EXTENDED RELEASE ORAL
Qty: 30 TABLET | Refills: 5 | Status: SHIPPED | OUTPATIENT
Start: 2023-07-10 | End: 2023-07-13 | Stop reason: SDUPTHER

## 2023-07-10 RX ORDER — APIXABAN 5 MG/1
TABLET, FILM COATED ORAL
Qty: 60 TABLET | Refills: 5 | Status: SHIPPED | OUTPATIENT
Start: 2023-07-10 | End: 2023-07-13 | Stop reason: SDUPTHER

## 2023-07-10 NOTE — TELEPHONE ENCOUNTER
Medication:   Requested Prescriptions     Pending Prescriptions Disp Refills    metoprolol succinate (TOPROL XL) 25 MG extended release tablet [Pharmacy Med Name: METOPROLOL SUCC ER 25 MG TAB] 30 tablet 5     Sig: TAKE ONE TABLET BY MOUTH DAILY    ELIQUIS 5 MG TABS tablet [Pharmacy Med Name: Paul Francoise 5 MG TABLET] 60 tablet 5     Sig: TAKE ONE TABLET BY MOUTH TWICE A DAY        Last Filled:  11/8/22, 11/8/22    Patient Phone Number: [phone] (home)     Last appt: 11/7/2022   Next appt: 7/13/2023    Last OARRS: No flowsheet data found.

## 2023-07-13 ENCOUNTER — OFFICE VISIT (OUTPATIENT)
Dept: PRIMARY CARE CLINIC | Age: 48
End: 2023-07-13
Payer: COMMERCIAL

## 2023-07-13 VITALS
SYSTOLIC BLOOD PRESSURE: 134 MMHG | HEART RATE: 73 BPM | TEMPERATURE: 97.7 F | WEIGHT: 303 LBS | BODY MASS INDEX: 42.42 KG/M2 | OXYGEN SATURATION: 97 % | HEIGHT: 71 IN | DIASTOLIC BLOOD PRESSURE: 87 MMHG

## 2023-07-13 DIAGNOSIS — I10 ESSENTIAL HYPERTENSION: Primary | ICD-10-CM

## 2023-07-13 DIAGNOSIS — I51.7 LVH (LEFT VENTRICULAR HYPERTROPHY): ICD-10-CM

## 2023-07-13 DIAGNOSIS — I63.9 CEREBROVASCULAR ACCIDENT (CVA), UNSPECIFIED MECHANISM (HCC): ICD-10-CM

## 2023-07-13 DIAGNOSIS — I10 ESSENTIAL HYPERTENSION: ICD-10-CM

## 2023-07-13 DIAGNOSIS — D12.6 TUBULAR ADENOMA OF COLON: ICD-10-CM

## 2023-07-13 DIAGNOSIS — I48.3 TYPICAL ATRIAL FLUTTER (HCC): ICD-10-CM

## 2023-07-13 DIAGNOSIS — E66.01 OBESITY, CLASS III, BMI 40-49.9 (MORBID OBESITY) (HCC): ICD-10-CM

## 2023-07-13 LAB
ALBUMIN SERPL-MCNC: 4.5 G/DL (ref 3.4–5)
ALBUMIN/GLOB SERPL: 1.8 {RATIO} (ref 1.1–2.2)
ALP SERPL-CCNC: 81 U/L (ref 40–129)
ALT SERPL-CCNC: 36 U/L (ref 10–40)
ANION GAP SERPL CALCULATED.3IONS-SCNC: 9 MMOL/L (ref 3–16)
AST SERPL-CCNC: 32 U/L (ref 15–37)
BILIRUB SERPL-MCNC: 0.5 MG/DL (ref 0–1)
BUN SERPL-MCNC: 13 MG/DL (ref 7–20)
CALCIUM SERPL-MCNC: 9.2 MG/DL (ref 8.3–10.6)
CHLORIDE SERPL-SCNC: 104 MMOL/L (ref 99–110)
CHOLEST SERPL-MCNC: 106 MG/DL (ref 0–199)
CO2 SERPL-SCNC: 24 MMOL/L (ref 21–32)
CREAT SERPL-MCNC: 0.9 MG/DL (ref 0.9–1.3)
GFR SERPLBLD CREATININE-BSD FMLA CKD-EPI: >60 ML/MIN/{1.73_M2}
GLUCOSE SERPL-MCNC: 87 MG/DL (ref 70–99)
HDLC SERPL-MCNC: 28 MG/DL (ref 40–60)
LDLC SERPL CALC-MCNC: 53 MG/DL
POTASSIUM SERPL-SCNC: 4.2 MMOL/L (ref 3.5–5.1)
PROT SERPL-MCNC: 7 G/DL (ref 6.4–8.2)
SODIUM SERPL-SCNC: 137 MMOL/L (ref 136–145)
TRIGL SERPL-MCNC: 124 MG/DL (ref 0–150)
VLDLC SERPL CALC-MCNC: 25 MG/DL

## 2023-07-13 PROCEDURE — 99214 OFFICE O/P EST MOD 30 MIN: CPT | Performed by: FAMILY MEDICINE

## 2023-07-13 PROCEDURE — 3075F SYST BP GE 130 - 139MM HG: CPT | Performed by: FAMILY MEDICINE

## 2023-07-13 PROCEDURE — 3079F DIAST BP 80-89 MM HG: CPT | Performed by: FAMILY MEDICINE

## 2023-07-13 RX ORDER — LOSARTAN POTASSIUM 100 MG/1
100 TABLET ORAL DAILY
Qty: 90 TABLET | Refills: 3 | Status: SHIPPED | OUTPATIENT
Start: 2023-07-13

## 2023-07-13 RX ORDER — ATORVASTATIN CALCIUM 40 MG/1
40 TABLET, FILM COATED ORAL NIGHTLY
Qty: 90 TABLET | Refills: 3 | Status: SHIPPED | OUTPATIENT
Start: 2023-07-13

## 2023-07-13 RX ORDER — METOPROLOL SUCCINATE 25 MG/1
25 TABLET, EXTENDED RELEASE ORAL DAILY
Qty: 90 TABLET | Refills: 3 | Status: SHIPPED | OUTPATIENT
Start: 2023-07-13

## 2023-07-13 ASSESSMENT — PATIENT HEALTH QUESTIONNAIRE - PHQ9
SUM OF ALL RESPONSES TO PHQ QUESTIONS 1-9: 0
SUM OF ALL RESPONSES TO PHQ9 QUESTIONS 1 & 2: 0
2. FEELING DOWN, DEPRESSED OR HOPELESS: 0
SUM OF ALL RESPONSES TO PHQ QUESTIONS 1-9: 0
1. LITTLE INTEREST OR PLEASURE IN DOING THINGS: 0
SUM OF ALL RESPONSES TO PHQ QUESTIONS 1-9: 0
SUM OF ALL RESPONSES TO PHQ QUESTIONS 1-9: 0

## 2023-07-13 NOTE — PATIENT INSTRUCTIONS
WELL ADULT LIFESTYLE INSTRUCTIONS:    Dorothy Covert a day in the next week to spend an hour reviewing the information below then:     1) determine your health goals for the year   2) determine what changes you need to achieve those goals   3) design your daily routine, shopping habits etc to implement those changes        Default Right Action (no choices)       Make it EASY to do the RIGHT THINGS. 4) I invite you to send me your plans via [employer] so I can continue to help you       with them    Examine your lifestyle with an emphasis on BARRIERS to bad and good habits and how you can design your life to make better choices. If you want to feel better these are the FUNDAMENTAL PILLARS of Wellness:    1)  You can choose to Get 150 min/week of moderate exercise (can talk but can't        sing) or 75 min/week of vigorous exercise (can't talk). This will enhance your sense of well being (Exercise is as good as medicine for   depression.)    2)  You can choose to Get 7-9 hours of sleep per night    Detoxifies your brain, reduces risk of dementia    3)  You can choose to Strength Train 2 x a week on non-consecutive days   This will improve function and reduce risk of injury. Body weight type exercises   such as Yoga and Pilates are excellent choices. 4)  You can choose Good Nutrition. Only eat your goal weight (in lbs) x 10        calories/day and get 5 servings of Vegetables/day   Plant based diets reduce risk of heart attack/stroke and will help you feel full on   less food. Avoid highly processed foods and processed carbohydrates. 5)  You can choose Moderate alcohol intake < 1-2 drinks/day   Alcohol will disrupt your sleep and add calories to your day. 6)  You can choose to Develop a Charismatic/Supportive relationship. This will strengthen your resilience for the ups and downs. 7)  You can choose to Practice Mindfulness. An hour a day of prayer/meditation/gratitude will change your life!     If

## 2023-07-13 NOTE — PROGRESS NOTES
Chief Complaint   Patient presents with    Check-Up    Skin Problem     Hands are so dry they are cracking and are painful. HPI: Zaida Draper  presents for evaluation and management of multiple medical problems    Ova Rowe notes that he is feeling pretty good. He has had a history of stroke and atrial flutter. Reports he is taking and tolerating his aspirin and Eliquis. Notes no easy bleeding bruising or abdominal pain. Denies palpitations. Denies change in vision or new neurologic symptoms. He notes he is taking tolerating his blood pressure medicine without lightheadedness or dizziness. He has a history of tubular adenoma. He is scheduled for follow-up colonoscopy with Dr. Sudhir Morales later    Today's PHQ:    [facility] Scores 7/13/2023 3/24/2022 4/6/2021 3/1/2019   PHQ2 Score 0 0 0 0   PHQ9 Score 0 0 0 0     Interpretation of Total Score Depression Severity: 1-4 = Minimal depression, 5-9 = Mild depression, 10-14 = Moderate depression, 15-19 = Moderately severe depression, 20-27 = Severe depression        Review of Systems    Allergies   Allergen Reactions    Ibuprofen      kidneys    Vicodin [Hydrocodone-Acetaminophen]     Codeine Rash     New Prescriptions    No medications on file     Current Outpatient Medications   Medication Sig Dispense Refill    atorvastatin (LIPITOR) 40 MG tablet Take 1 tablet by mouth nightly 90 tablet 3    apixaban (ELIQUIS) 5 MG TABS tablet Take 1 tablet by mouth 2 times daily 60 tablet 5    losartan (COZAAR) 100 MG tablet Take 1 tablet by mouth daily 90 tablet 3    metoprolol succinate (TOPROL XL) 25 MG extended release tablet Take 1 tablet by mouth daily 90 tablet 3    aspirin 81 MG chewable tablet Take 1 tablet by mouth daily       No current facility-administered medications for this visit.        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

## 2023-09-07 NOTE — PROGRESS NOTES
mmHg  Aortic pressure 136 mmHg     Coronary anatomy:   The left main coronary artery is normal.      Left anterior descending artery is normal     Circumflex artery is normal.     The right coronary artery is a dominant vessel and normal.      Left ventriculogram shows ejection fraction of 55%. Normal wall motion. Assessment:      1. Exertional dyspnea - Previous TTE from March 2021 showed moderate-severe LVH with normal wall motion, grade 2 diastolic dysfunction, and no significant aortic stenosis. He has has a systolic murmur on exam that becomes louder with Valsalva maneuver and could possibly be secondary to a dynamic LVOT obstruction. Will therefore, plan to repeat TTE to further evaluate for LVOT obstruction and evidence of possible HOCM. Pending results, can consider cardiac MRI for further evaluation. He underwent invasive coronary angiography in 2021 that demonstrated normal coronary arteries and he currently appears to be near a clinically euvolemic state. He is morbidly obese and has an extensive smoking history, and his obesity and possible COPD could be contributing to his symptoms as well. 2. Diastolic dysfunction - Currently appears to be near a clinically euvolemic state as above. 3. Palpitations - Will arrange for 2-week V[employer] Connect monitor to further evaluate for recurrent SVT/atrial arrhythmias. If HOCM present, would also increase risk for VT. 4. Lightheadedness - Symptoms could be due to orthostatic hypotension, but will also recommend further evaluation for possible HOCM and to rule out potential arrhythmias that could be contributing as above. 5. Atrial flutter - S/p RFCA. 6. AVNRT - S/p RFCA. 7. Essential hypertension - BP currently controlled. 8. Systolic murmur  9. LVH  10. CKD  11. Mildly dilated aortic root  12. Morbid obesity  13. Tobacco use       Plan:      1. Will obtain TTE to further evaluate for LVOT obstruction and for evidence of possible HOCM.   Can also

## 2023-09-08 ENCOUNTER — TELEPHONE (OUTPATIENT)
Dept: CARDIOLOGY CLINIC | Age: 48
End: 2023-09-08

## 2023-09-08 ENCOUNTER — OFFICE VISIT (OUTPATIENT)
Dept: CARDIOLOGY CLINIC | Age: 48
End: 2023-09-08

## 2023-09-08 VITALS
WEIGHT: 301 LBS | HEIGHT: 71 IN | OXYGEN SATURATION: 95 % | BODY MASS INDEX: 42.14 KG/M2 | SYSTOLIC BLOOD PRESSURE: 116 MMHG | HEART RATE: 69 BPM | DIASTOLIC BLOOD PRESSURE: 78 MMHG

## 2023-09-08 DIAGNOSIS — Z72.0 TOBACCO USE: ICD-10-CM

## 2023-09-08 DIAGNOSIS — R42 LIGHTHEADEDNESS: ICD-10-CM

## 2023-09-08 DIAGNOSIS — R00.2 PALPITATIONS: ICD-10-CM

## 2023-09-08 DIAGNOSIS — I51.89 DIASTOLIC DYSFUNCTION: ICD-10-CM

## 2023-09-08 DIAGNOSIS — I77.810 DILATED AORTIC ROOT (HCC): ICD-10-CM

## 2023-09-08 DIAGNOSIS — R01.1 SYSTOLIC MURMUR: ICD-10-CM

## 2023-09-08 DIAGNOSIS — I47.1 AVNRT (AV NODAL RE-ENTRY TACHYCARDIA) (HCC): ICD-10-CM

## 2023-09-08 DIAGNOSIS — R06.09 EXERTIONAL DYSPNEA: ICD-10-CM

## 2023-09-08 DIAGNOSIS — Z76.89 ESTABLISHING CARE WITH NEW DOCTOR, ENCOUNTER FOR: Primary | ICD-10-CM

## 2023-09-08 DIAGNOSIS — E66.01 MORBID OBESITY (HCC): ICD-10-CM

## 2023-09-08 DIAGNOSIS — I48.3 TYPICAL ATRIAL FLUTTER (HCC): ICD-10-CM

## 2023-09-08 NOTE — PATIENT INSTRUCTIONS
Continue atorvastatin 40 mg daily, Eliquis 5 mg twice daily, losartan 100 mg daily, metoprolol succinate 25 mg daily and aspirin 81 mg daily. START Chantix 1mg twice daily   Echocardiogram to access heart size and strength. Call [phone] to schedule this test   I Strongly advised complete smoking cessation. Resources given to assist quitting including the followin-QUIT NOW. Chantix prescribed. Continue to encourage heart healthy, low sodium diet and regular physical exercise for at least 30 minutes a minimum of 3-5 times per week. I recommend you wear 2 week cardiac event monitor for palpitations/ lightheadedness. Orthostatic BP today  Follow up with me in 3 months.

## 2023-09-08 NOTE — TELEPHONE ENCOUNTER
Monitor placed by [facility] to be mailed to pt home 9/8/23  [address]  Length of monitor 14 day  Monitor ordered by [facility]  Serial number   Kit ID   Activation successful prior to pt leaving office?  NA

## 2023-09-08 NOTE — TELEPHONE ENCOUNTER
Pt was seen today at the [facility] location w/Health system. Per [facility] pt needs to be return in about 3 months (around 12/8/2023). [facility]'s December schedule has yet to be released. Please contact pt once December for [facility] is opened.

## 2023-09-14 NOTE — TELEPHONE ENCOUNTER
09/14 second attempt, called pt @ [phone] and lvm to return call and schedule 3 mo [facility] appt in dec @ [facility].  Next step will be mailing letter

## 2023-09-15 NOTE — TELEPHONE ENCOUNTER
Pts wife returned call. Attempted to schedule for [facility] at [facility] for 12/11 and 12/26. Pts wife said that they will be oot from 12/3-12/17. Pt will not be available 12/26 but will be back in town 1/2 or 1/3. Please advise.

## 2023-09-15 NOTE — TELEPHONE ENCOUNTER
Please callback and offer patient 11/22 either at 11:15 or 2:15 at [facility] AT [place] if this location works for him? If not. we can put on call back list for early next year when [facility] schedule is out. Thanks.

## 2023-09-19 PROCEDURE — 93270 REMOTE 30 DAY ECG REV/REPORT: CPT | Performed by: INTERNAL MEDICINE

## 2023-09-21 NOTE — TELEPHONE ENCOUNTER
Called and spoke w/ pts wife and she stated he is working that week and will be unable to get off. Is there another time available?

## 2023-09-21 NOTE — TELEPHONE ENCOUNTER
Called and spoke with wife informed her Dr Jorge Kan has no other appts for the remaining of the year as the appts previously offered do not work. Wife agreed to take appt 11/15 at 3pm at [facility] AT [facility]. Appt made.

## 2023-09-21 NOTE — TELEPHONE ENCOUNTER
9/21 pts wife returned call. Gave provided date and times. They accepted 11/22 at 215. Went to schedule and there is another pt in that time slot at 215 and 1115. Please advise.

## 2023-09-21 NOTE — TELEPHONE ENCOUNTER
09/21 2nd attempt, called pt @ [phone] and lvm to return call and schedule w/ [facility] for provided overbook date and time

## 2023-09-21 NOTE — TELEPHONE ENCOUNTER
Please call back and offer patient 11/15 at 3pm at [facility] AT [facility]. (I have placed this appt on hold for him) Thank you

## 2023-09-24 RX ORDER — VARENICLINE TARTRATE 1 MG/1
1 TABLET, FILM COATED ORAL 2 TIMES DAILY
Qty: 180 TABLET | Refills: 3 | Status: SHIPPED | OUTPATIENT
Start: 2023-09-24

## 2023-09-24 ASSESSMENT — ENCOUNTER SYMPTOMS
PHOTOPHOBIA: 0
ABDOMINAL PAIN: 0
CONSTIPATION: 0
RHINORRHEA: 0
VOMITING: 0
DIARRHEA: 0
NAUSEA: 0
SHORTNESS OF BREATH: 1
EYE PAIN: 0
BLOOD IN STOOL: 0
COUGH: 0
SORE THROAT: 0

## 2023-10-10 PROCEDURE — 93272 ECG/REVIEW INTERPRET ONLY: CPT | Performed by: INTERNAL MEDICINE

## 2023-10-25 DIAGNOSIS — I47.10 SVT (SUPRAVENTRICULAR TACHYCARDIA): Primary | ICD-10-CM

## 2023-10-25 DIAGNOSIS — I47.10 SVT (SUPRAVENTRICULAR TACHYCARDIA): ICD-10-CM

## 2023-10-25 DIAGNOSIS — I48.92 ATRIAL FLUTTER, PAROXYSMAL (HCC): Primary | ICD-10-CM

## 2023-11-07 ENCOUNTER — PROCEDURE VISIT (OUTPATIENT)
Dept: CARDIOLOGY CLINIC | Age: 48
End: 2023-11-07
Payer: COMMERCIAL

## 2023-11-07 DIAGNOSIS — R01.1 HEART MURMUR: Primary | ICD-10-CM

## 2023-11-07 PROCEDURE — 93306 TTE W/DOPPLER COMPLETE: CPT | Performed by: INTERNAL MEDICINE

## 2023-11-15 ENCOUNTER — OFFICE VISIT (OUTPATIENT)
Dept: CARDIOLOGY CLINIC | Age: 48
End: 2023-11-15

## 2023-11-15 ENCOUNTER — TELEPHONE (OUTPATIENT)
Dept: CARDIOLOGY CLINIC | Age: 48
End: 2023-11-15

## 2023-11-15 VITALS
OXYGEN SATURATION: 94 % | BODY MASS INDEX: 42.14 KG/M2 | SYSTOLIC BLOOD PRESSURE: 132 MMHG | HEART RATE: 74 BPM | DIASTOLIC BLOOD PRESSURE: 82 MMHG | WEIGHT: 301 LBS | HEIGHT: 71 IN

## 2023-11-15 DIAGNOSIS — I42.1 HYPERTROPHIC OBSTRUCTIVE CARDIOMYOPATHY (HCC): Primary | ICD-10-CM

## 2023-11-15 DIAGNOSIS — Z72.0 TOBACCO USE: ICD-10-CM

## 2023-11-15 DIAGNOSIS — R00.2 PALPITATIONS: ICD-10-CM

## 2023-11-15 DIAGNOSIS — I34.0 NONRHEUMATIC MITRAL VALVE REGURGITATION: ICD-10-CM

## 2023-11-15 DIAGNOSIS — I42.1 HOCM (HYPERTROPHIC OBSTRUCTIVE CARDIOMYOPATHY) (HCC): ICD-10-CM

## 2023-11-15 DIAGNOSIS — E66.01 MORBID OBESITY (HCC): ICD-10-CM

## 2023-11-15 DIAGNOSIS — R42 LIGHTHEADEDNESS: ICD-10-CM

## 2023-11-15 DIAGNOSIS — I48.3 TYPICAL ATRIAL FLUTTER (HCC): ICD-10-CM

## 2023-11-15 DIAGNOSIS — R06.09 EXERTIONAL DYSPNEA: Primary | ICD-10-CM

## 2023-11-15 DIAGNOSIS — I51.89 DIASTOLIC DYSFUNCTION: ICD-10-CM

## 2023-11-15 DIAGNOSIS — I47.19 AVNRT (AV NODAL RE-ENTRY TACHYCARDIA): ICD-10-CM

## 2023-11-15 RX ORDER — METOPROLOL SUCCINATE 25 MG/1
50 TABLET, EXTENDED RELEASE ORAL DAILY
Qty: 180 TABLET | Refills: 3 | Status: SHIPPED | OUTPATIENT
Start: 2023-11-15 | End: 2023-11-15

## 2023-11-15 RX ORDER — METOPROLOL SUCCINATE 50 MG/1
50 TABLET, EXTENDED RELEASE ORAL DAILY
Qty: 90 TABLET | Refills: 3 | Status: SHIPPED | OUTPATIENT
Start: 2023-11-15

## 2023-11-15 RX ORDER — METOPROLOL SUCCINATE 50 MG/1
50 TABLET, EXTENDED RELEASE ORAL DAILY
Qty: 90 TABLET | Refills: 1 | Status: CANCELLED | OUTPATIENT
Start: 2023-11-15

## 2023-11-15 ASSESSMENT — ENCOUNTER SYMPTOMS
PHOTOPHOBIA: 0
DIARRHEA: 0
CONSTIPATION: 0
VOMITING: 0
COUGH: 0
ABDOMINAL PAIN: 0
NAUSEA: 0
BLOOD IN STOOL: 0
SORE THROAT: 0
EYE PAIN: 0
SHORTNESS OF BREATH: 1
RHINORRHEA: 0

## 2023-11-15 NOTE — PROGRESS NOTES
[address]   Cardiovascular Evaluation          PCP:  Suszanne Apley, MD    Reason for evaluation:   Follow-up for heart murmur, shortness of breath     Subjective: Kris Rodarte is a 51 y.o. male with history of atrial flutter s/p RFCA, AVNRT s/p RFCA, essential hypertension, LVH, diastolic dysfunction, CKD, LVH, mildly dilated aortic root, and obesity referred for further evaluation of a heart murmur. The patient was initially evaluated in clinic on 9/3/23 and at that time reported that he had been getting short of breath with relatively minimal exertion. He said that he noticed this when walking up inclines at work. He denied any associated chest pain. He also stated that at other times, he had been having palpitations which he described as a sensation that his heart was beating in his throat. He additionally reported that he had been having episodes of dizziness when he gets up too quickly or when he turns quickly. He denied any syncope. He further stated that he sometimes notices some mild swelling in his lower extremities. He reported that he had been staying well-hydrated. He smokes one pack of cigarettes per day and has been smoking for approximately 30 years. He drinks alcohol on the weekends and denies recreational drug use. He had a nuclear SPECT stress test perform in March 2021 that showed small reversible distal anterior defect. He subsequently underwent invasive angiography on which showed no angiographically significant coronary artery disease. A TTE from 3/22/21 showed an LVEF of 55-60% with normal wall motion, moderate-severe concentric LVH, grade 2 diastolic dysfunction, normal RV size and systolic function, mildly dilated aortic root measuring 4.2 cm.      Following his initial visit, a repeat TTE was obtained and showed hyperdynamic LV systolic function with an estimated ejection fraction of >65%, normal wall motion, moderate-severe LVH, grade

## 2023-11-15 NOTE — TELEPHONE ENCOUNTER
----- Message from Daryle Dryer, DO sent at 11/15/2023 10:02 AM EST -----  Patient's TTE shows thickening of the left heart muscle as well as a late peaking LVOT gradient of up to 78 mmHg with Valsalva. Overall findings are concerning for a possible hypertrophic obstructive cardiomyopathy. Recommend obtaining a cardiac MRI to further characterize his cardiac structure and evaluate for possible septal scarring. I would also like him to increase his metoprolol succinate to 50 mg daily and can continue to titrate as tolerated. Please also place referral to Dr. Delmar Vallejo for further evaluation and treatment of possible HOCM. His recent cardiac monitor showed predominant sinus rhythm with average heart rate of 74 bpm, rare PACs, rare PVCs, brief SVT, one 3-beat run of NS VT, and intermittent Mobitz type I AV block complete.

## 2024-01-04 ENCOUNTER — HOSPITAL ENCOUNTER (OUTPATIENT)
Dept: MRI IMAGING | Age: 49
Discharge: HOME | End: 2024-01-04
Attending: INTERNAL MEDICINE

## 2024-01-04 DIAGNOSIS — R06.09 EXERTIONAL DYSPNEA: ICD-10-CM

## 2024-01-08 ENCOUNTER — TELEPHONE (OUTPATIENT)
Dept: CARDIOLOGY CLINIC | Age: 49
End: 2024-01-08

## 2024-01-08 NOTE — TELEPHONE ENCOUNTER
Pt wife called stating pt couldn`t finish MRI due to claustrophobia. Was asking if [facility] would prescript medication to perform MRI. Preferred pharmacy is [facility] [id number] - [place] - [address] -  [phone] - F [phone] [[id number]] . Please advise

## 2024-01-08 NOTE — TELEPHONE ENCOUNTER
Lets please have him take 1 mg of p.o. Ativan prior to MRI and see if he is able to tolerate.  Thanks.

## 2024-01-09 NOTE — TELEPHONE ENCOUNTER
Attempted to call patient to relay Dr Renteria message, No answer. Voice message left to call back office for message.

## 2024-01-10 NOTE — TELEPHONE ENCOUNTER
Attempted to reach the patient but patient's wife answered. Okay to discuss per HIPAA. She states that 1 mg Ativan will not work for the patient as he has tried this in the past without success. Please advise.

## 2024-02-09 NOTE — PROGRESS NOTES
Topics Concern    Not on file   Social History Narrative    Not on file     Social Determinants of Health     Financial Resource Strain: Low Risk  (11/7/2022)    Overall Financial Resource Strain (CARDIA)     Difficulty of Paying Living Expenses: Not hard at all   Food Insecurity: No Food Insecurity (11/7/2022)    Hunger Vital Sign     Worried About Running Out of Food in the Last Year: Never true     Ran Out of Food in the Last Year: Never true   Transportation Needs: Not on file   Physical Activity: Not on file   Stress: Not on file   Social Connections: Not on file   Intimate Partner Violence: Not on file   Housing Stability: Not on file       Review of Systems:   Constitutional: there has been no unanticipated weight loss. There's been no change in energy level, sleep pattern, or activity level.     Eyes: No visual changes or diplopia. No scleral icterus.  ENT: No Headaches, hearing loss or vertigo. No mouth sores or sore throat.  Cardiovascular: Reviewed in HPI  Respiratory: No cough or wheezing, no sputum production. No hematemesis.    Gastrointestinal: No abdominal pain, appetite loss, blood in stools. No change in bowel or bladder habits.  Genitourinary: No dysuria, trouble voiding, or hematuria.  Musculoskeletal:  No gait disturbance, weakness or joint complaints.  Integumentary: No rash or pruritis.  Neurological: No headache, diplopia, change in muscle strength, numbness or tingling. No change in gait, balance, coordination, mood, affect, memory, mentation, behavior.  Psychiatric: No anxiety, no depression.  Endocrine: No malaise, fatigue or temperature intolerance. No excessive thirst, fluid intake, or urination. No tremor.  Hematologic/Lymphatic: No abnormal bruising or bleeding, blood clots or swollen lymph nodes.  Allergic/Immunologic: No nasal congestion or hives.    Physical Examination:    Vitals:    02/13/24 1049   BP: 112/76   Pulse: 64   SpO2: 94%   Weight: 136.1 kg (300 lb)   Height: 1.791 m

## 2024-02-13 ENCOUNTER — OFFICE VISIT (OUTPATIENT)
Dept: CARDIOLOGY CLINIC | Age: 49
End: 2024-02-13

## 2024-02-13 VITALS
SYSTOLIC BLOOD PRESSURE: 112 MMHG | WEIGHT: 300 LBS | HEIGHT: 71 IN | HEART RATE: 64 BPM | DIASTOLIC BLOOD PRESSURE: 76 MMHG | BODY MASS INDEX: 42 KG/M2 | OXYGEN SATURATION: 94 %

## 2024-02-13 DIAGNOSIS — R42 LIGHTHEADEDNESS: ICD-10-CM

## 2024-02-13 DIAGNOSIS — I10 ESSENTIAL HYPERTENSION: ICD-10-CM

## 2024-02-13 DIAGNOSIS — I47.10 SVT (SUPRAVENTRICULAR TACHYCARDIA): ICD-10-CM

## 2024-02-13 DIAGNOSIS — I42.1 HYPERTROPHIC OBSTRUCTIVE CARDIOMYOPATHY (HCC): Primary | ICD-10-CM

## 2024-02-13 DIAGNOSIS — I34.0 NONRHEUMATIC MITRAL VALVE REGURGITATION: ICD-10-CM

## 2024-02-13 DIAGNOSIS — R42 DIZZY: ICD-10-CM

## 2024-02-13 DIAGNOSIS — R06.09 EXERTIONAL DYSPNEA: ICD-10-CM

## 2024-02-13 DIAGNOSIS — Q24.8 LEFT VENTRICULAR OUTFLOW TRACT OBSTRUCTION: ICD-10-CM

## 2024-02-13 PROBLEM — R60.0 LOCALIZED EDEMA: Status: ACTIVE | Noted: 2021-08-17

## 2024-02-13 NOTE — PATIENT INSTRUCTIONS
Your provider has ordered testing for further evaluation.  An order/prescription has been included in your paper work.   To schedule outpatient testing, contact Central Scheduling by calling [facility] ([phone]).      Plan:  We discussed HOCM, its implications in causing outflow tract obstruction  Discussed camzyos medication that can be prescribed to reduce LV outflow tract obstruction and improve symptoms.  We discussed the frequent echos required as part of the REMS program   We discussed the importance of notifying us of any medications changes to rule out drug interactions  Camzyos patient brochure and education packet provided today  6 minute fernandez walk  Genetic testing today  Let us know if you want to schedule a time to come back to start the camzyos patient registration  Lab now: BNP  Follow up in 3 months

## 2024-02-20 ENCOUNTER — APPOINTMENT (OUTPATIENT)
Dept: GENERAL RADIOLOGY | Age: 49
End: 2024-02-20
Payer: COMMERCIAL

## 2024-02-20 ENCOUNTER — HOSPITAL ENCOUNTER (EMERGENCY)
Age: 49
Discharge: HOME | End: 2024-02-20
Attending: EMERGENCY MEDICINE
Payer: COMMERCIAL

## 2024-02-20 VITALS
BODY MASS INDEX: 42 KG/M2 | HEIGHT: 71 IN | TEMPERATURE: 98.9 F | OXYGEN SATURATION: 93 % | SYSTOLIC BLOOD PRESSURE: 111 MMHG | DIASTOLIC BLOOD PRESSURE: 78 MMHG | WEIGHT: 300 LBS | HEART RATE: 52 BPM | RESPIRATION RATE: 16 BRPM

## 2024-02-20 DIAGNOSIS — J10.1 INFLUENZA B: Primary | ICD-10-CM

## 2024-02-20 LAB
FLUAV RNA UPPER RESP QL NAA+PROBE: NEGATIVE
FLUBV AG NPH QL: POSITIVE
SARS-COV-2 RDRP RESP QL NAA+PROBE: NOT DETECTED

## 2024-02-20 PROCEDURE — 87635 SARS-COV-2 COVID-19 AMP PRB: CPT

## 2024-02-20 PROCEDURE — 87804 INFLUENZA ASSAY W/OPTIC: CPT

## 2024-02-20 PROCEDURE — 71045 X-RAY EXAM CHEST 1 VIEW: CPT

## 2024-02-20 PROCEDURE — 99284 EMERGENCY DEPT VISIT MOD MDM: CPT

## 2024-02-20 PROCEDURE — 6370000000 HC RX 637 (ALT 250 FOR IP): Performed by: EMERGENCY MEDICINE

## 2024-02-20 RX ORDER — OXYMETAZOLINE HYDROCHLORIDE 0.05 G/100ML
2 SPRAY NASAL ONCE
Status: COMPLETED | OUTPATIENT
Start: 2024-02-20 | End: 2024-02-20

## 2024-02-20 RX ORDER — IPRATROPIUM BROMIDE AND ALBUTEROL SULFATE 2.5; .5 MG/3ML; MG/3ML
1 SOLUTION RESPIRATORY (INHALATION) ONCE
Status: COMPLETED | OUTPATIENT
Start: 2024-02-20 | End: 2024-02-20

## 2024-02-20 RX ORDER — ACETAMINOPHEN 325 MG/1
650 TABLET ORAL ONCE
Status: COMPLETED | OUTPATIENT
Start: 2024-02-20 | End: 2024-02-20

## 2024-02-20 RX ADMIN — Medication 2 SPRAY: at 09:56

## 2024-02-20 RX ADMIN — ACETAMINOPHEN 650 MG: 325 TABLET ORAL at 09:56

## 2024-02-20 RX ADMIN — IPRATROPIUM BROMIDE AND ALBUTEROL SULFATE 1 DOSE: 2.5; .5 SOLUTION RESPIRATORY (INHALATION) at 09:56

## 2024-02-20 ASSESSMENT — PAIN SCALES - GENERAL
PAINLEVEL_OUTOF10: 5
PAINLEVEL_OUTOF10: 6

## 2024-02-20 ASSESSMENT — PAIN - FUNCTIONAL ASSESSMENT: PAIN_FUNCTIONAL_ASSESSMENT: 0-10

## 2024-02-20 NOTE — DISCHARGE INSTRUCTIONS
You are are positive for influenza B.  Please take DayQuil or NyQuil to help manage her symptoms.  If your symptoms worsen despite treatment please come back to the ER for repeat evaluation.

## 2024-02-20 NOTE — ED PROVIDER NOTES
[facility] EMERGENCY DEPARTMENT  EMERGENCY DEPARTMENT ENCOUNTER      Pt Name: Mervin Herndon  MRN: [id number]  Birthdate 1975  Date of evaluation: 2/20/2024  Provider: Eddie Collins MD    CHIEF COMPLAINT       Chief Complaint   Patient presents with    URI     Pt having congestion, body aches, fevers since Friday         HISTORY OF PRESENT ILLNESS   (Location/Symptom, Timing/Onset, Context/Setting, Quality, Duration, Modifying Factors, Severity)  Note limiting factors.   Mervin Herndon is a 48 y.o. male who presents to the emergency department with the chief complaint of   Chief Complaint   Patient presents with    URI     Pt having congestion, body aches, fevers since Friday   . 48-year-old male with past medical history as listed below presents to the ER for evaluation of cough, fevers, nasal congestion and myalgias.  Patient states symptoms started approximate 5 days ago.  Patient states initially had diffuse bodyaches worse in his shoulders.  Patient describes subjective fevers, persistent nonproductive cough, nasal congestion and decreased activity.  Patient states has been taking over-the-counter cold medications with ibuprofen to help with the symptoms.        Nursing Notes were reviewed.    REVIEW OF SYSTEMS    (2-9 systems for level 4, 10 or more for level 5)     Review of Systems   All other systems reviewed and are negative.      Except as noted above the remainder of the review of systems was reviewed and negative.       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    Polycystic kidney     Dr. Chilel    PSVT (paroxysmal supraventricular tachycardia)     Stage 2 chronic kidney disease 08/12/2017    Tubular adenoma of colon 09/21/2020

## 2024-02-22 ENCOUNTER — TELEPHONE (OUTPATIENT)
Dept: CARDIOLOGY CLINIC | Age: 49
End: 2024-02-22

## 2024-02-22 ENCOUNTER — LAB (OUTPATIENT)
Dept: CARDIOLOGY CLINIC | Age: 49
End: 2024-02-22

## 2024-02-22 ENCOUNTER — HOSPITAL ENCOUNTER (OUTPATIENT)
Age: 49
Discharge: HOME | End: 2024-02-22
Payer: COMMERCIAL

## 2024-02-22 DIAGNOSIS — R06.09 EXERTIONAL DYSPNEA: ICD-10-CM

## 2024-02-22 DIAGNOSIS — Q24.8 LEFT VENTRICULAR OUTFLOW TRACT OBSTRUCTION: ICD-10-CM

## 2024-02-22 DIAGNOSIS — I42.1 HYPERTROPHIC OBSTRUCTIVE CARDIOMYOPATHY (HCC): ICD-10-CM

## 2024-02-22 LAB — NT-PROBNP SERPL-MCNC: 63 PG/ML (ref 0–124)

## 2024-02-22 PROCEDURE — 83880 ASSAY OF NATRIURETIC PEPTIDE: CPT

## 2024-02-22 PROCEDURE — 36415 COLL VENOUS BLD VENIPUNCTURE: CPT

## 2024-02-27 ENCOUNTER — TELEPHONE (OUTPATIENT)
Dept: CARDIOLOGY CLINIC | Age: 49
End: 2024-02-27

## 2024-02-27 DIAGNOSIS — R06.02 SOB (SHORTNESS OF BREATH): Primary | ICD-10-CM

## 2024-02-27 DIAGNOSIS — I42.1 HOCM (HYPERTROPHIC OBSTRUCTIVE CARDIOMYOPATHY) (HCC): ICD-10-CM

## 2024-02-27 RX ORDER — MAVACAMTEN 5 MG/1
5 CAPSULE, GELATIN COATED ORAL DAILY
Qty: 35 CAPSULE | Refills: 3 | Status: SHIPPED | OUTPATIENT
Start: 2024-02-27 | End: 2024-03-05

## 2024-02-27 NOTE — TELEPHONE ENCOUNTER
Submitted PA for CAMZYOS  Via CMM (Key: [id number] STATUS: NOT SENT    Insurance is asking why the quantity of the medication is 35 tablets instead of 30.  Please advise and send back to the pool    Follow up done daily; if no decision with in three days we will refax.  If another three days goes by with no decision will call the insurance for status.

## 2024-03-04 ENCOUNTER — TELEPHONE (OUTPATIENT)
Dept: CARDIOLOGY CLINIC | Age: 49
End: 2024-03-04

## 2024-03-04 DIAGNOSIS — I42.1 HOCM (HYPERTROPHIC OBSTRUCTIVE CARDIOMYOPATHY) (HCC): Primary | ICD-10-CM

## 2024-03-04 NOTE — TELEPHONE ENCOUNTER
Pt wife stated pt dropped off [employer] and [employer] ppw on 2.27 or 2.28. Pt wife wanted to know if POLLY had finished the ppw. Please advise    Callback: [phone]

## 2024-03-04 NOTE — TELEPHONE ENCOUNTER
Shalom from [facility] called to see if pt has had an echo since starting Camzyos. Pts last echo appears to be in November 2023. Pt needs an updated echo prior to getting refills. Best phone number for them is [phone]    Last ov 02/13/2024 robyn  Next ov 05/21/2024 robyn

## 2024-03-04 NOTE — TELEPHONE ENCOUNTER
The medication was DENIED; DENIAL letter uploaded to MEDIA.    If you want an APPEAL; please note in this encounter what new information you would like to APPEAL with.  Once complete route back to PA POOL.    If this requires a response please respond to the pool ( P MHCX PSC MEDICATION PRE-AUTH).      Thank you please advise patient.    Insurance will not authorize 35 tablets only 30.

## 2024-03-05 PROBLEM — I42.1 HOCM (HYPERTROPHIC OBSTRUCTIVE CARDIOMYOPATHY) (HCC): Status: ACTIVE | Noted: 2024-03-05

## 2024-03-05 PROBLEM — Q24.8 LEFT VENTRICULAR OUTFLOW TRACT OBSTRUCTION: Status: ACTIVE | Noted: 2024-03-05

## 2024-03-05 RX ORDER — MAVACAMTEN 5 MG/1
5 CAPSULE, GELATIN COATED ORAL DAILY
Qty: 30 CAPSULE | Refills: 2 | Status: SHIPPED | OUTPATIENT
Start: 2024-03-05

## 2024-03-05 NOTE — TELEPHONE ENCOUNTER
Lvm for pt to return call. If pt calls back please tell him to start camzyos tomorrow 3/6/24. He needs to call scheduling at [phone] to schedule his echo between 3/27/24-4/5/24 per camzyos protocol. Order in chart.     Please also ask pt if there is a specific date he needs POLLY to put on his paperwork for start of disability? POLLY is finishing completing paperwork. We will call once ready

## 2024-03-05 NOTE — TELEPHONE ENCOUNTER
Lvm for pt to return call. Johnnie will complete paperwork today in office. I will notify pt when ready. If pt returns call please verify if he has started camzyos. Verify pharmacy.

## 2024-03-05 NOTE — TELEPHONE ENCOUNTER
Pt returned call. Message given. Pt has the camzyos but hasn`t started taking them. Pt states he wont be able to work once he starts taking the camzyos. Pharmacy   [facility] [id number] - [place] - [address] - P [phone] - F [phone]

## 2024-03-06 ENCOUNTER — TELEPHONE (OUTPATIENT)
Dept: CARDIOLOGY CLINIC | Age: 49
End: 2024-03-06

## 2024-03-06 NOTE — TELEPHONE ENCOUNTER
Called patient, spoke to his wife Daysi to let them know that his [facility] paperwork is ready to .   Paperwork placed up front.

## 2024-03-07 ENCOUNTER — HOSPITAL ENCOUNTER (OUTPATIENT)
Dept: PULMONOLOGY | Age: 49
Discharge: HOME | End: 2024-03-07
Payer: COMMERCIAL

## 2024-03-07 DIAGNOSIS — Q24.8 LEFT VENTRICULAR OUTFLOW TRACT OBSTRUCTION: ICD-10-CM

## 2024-03-07 DIAGNOSIS — R06.09 EXERTIONAL DYSPNEA: ICD-10-CM

## 2024-03-07 DIAGNOSIS — I42.1 HYPERTROPHIC OBSTRUCTIVE CARDIOMYOPATHY (HCC): ICD-10-CM

## 2024-03-07 PROCEDURE — 94618 PULMONARY STRESS TESTING: CPT

## 2024-03-07 NOTE — PROCEDURES
[facility] Pulmonary Function Lab - Six Minute Walk      Test Performed on:   Room Air___X___   Oxygen at _____ lpm via N/C- continuous Oxygen at _____ lpm via N/C- OCD  Assist Device Used During Test:  None___X___ Cane________ Walker_________    Modified Kraig's Scale  0 Nothing at all 5 Strong    0.5 Just noticeable 6 Stronger (Hard)    1 Very Light 7 Very Strong   2 Light 8 Very Very Strong   3 Moderate 9 Extremely Strong   4 Somewhat Strong 10 Maximum All out      Time SpO2 Heart Rate Respiratory Rate Dyspnea-  Modified Kraig’s Scale Fatigue- Modified Kraig’s Scale Other Symptoms   Baseline                     96% RA 72 17 0 0      1 minute                     96% RA 92 17 0 0    2 minutes                     96% RA 89 18 0 0      3 minutes                     95% RA 91 18 0 0    4 minutes                     96% RA 93 20 1 1    5 minutes                     96% RA 95 20 1 1    6 minutes                     96% RA 98 22 2 2    Recovery x 1 minute                     96% RA 88 20 1 1    Recovery x 2 Minute                     96% RA 81 18 0 0     Number of Laps_____15____ X 120 feet + _________ additional feet = Total Distance __1800_____feet     Total expected 6 MW distance is __1872_____feet. Patient achieved ___96___% of expected distance.   Pre Blood Pressure: 108/83  Post Blood Pressure:  Other symptoms at the end of exercise: very light shortness of breath, chronic back pain

## 2024-03-08 ENCOUNTER — TELEPHONE (OUTPATIENT)
Dept: CARDIOLOGY CLINIC | Age: 49
End: 2024-03-08

## 2024-03-08 NOTE — PROCEDURES
[address] [address] [place] [address]                           PULMONARY FUNCTION      PATIENT NAME: MARCELINA BLACK            : 1975  MED REC NO: [id number]                      ROOM:   ACCOUNT NO: [id number]                       ADMIT DATE: 2024  PROVIDER: Arturo Barreto MD      DATE OF PROCEDURE:      STUDY:  6-minute walk test interpretation.    The patient's baseline oxygen saturation was 96% on room air at rest with a heart rate of 72, respirations 17, dyspnea modified Kraig scale of 0, fatigue modified Kraig score of 0.  The patient did not have any exertional hypoxemia on this study.  The patient walked 1800 feet.  The patient's total expected 6-minute distance was 1872 feet.  The patient achieved 96% of expected distance on the basis of PFT.  On 6-minute walk test, the patient does not have any exertional hypoxemia on optimal exercise.  Please correlate clinically.          ARTURO BARRETO MD      D:  2024 18:15:28     T:  2024 06:54:33     SK/SANTANA  Job #:  [id number]     Doc#:  [id number]

## 2024-03-11 ENCOUNTER — TELEPHONE (OUTPATIENT)
Dept: CARDIOLOGY CLINIC | Age: 49
End: 2024-03-11

## 2024-03-11 NOTE — TELEPHONE ENCOUNTER
----- Message from Corazon Angulo MD sent at 3/8/2024  4:55 PM EST -----  jim Pineda walk 570 meters.  POLLY

## 2024-03-13 NOTE — TELEPHONE ENCOUNTER
Do you have any updates on the camzyos PA? The order has been changed to a 30 day supply as requested by pt insurance. Can this be resubmitted? Thanks

## 2024-03-15 NOTE — TELEPHONE ENCOUNTER
LVM for pt to return call regarding camzyos approval. If pt calls back please confirm if he also received this information. The specialty pharmacy should be contacting him for scheduled delivery

## 2024-03-16 ENCOUNTER — PATIENT MESSAGE (OUTPATIENT)
Dept: CARDIOLOGY CLINIC | Age: 49
End: 2024-03-16

## 2024-03-18 NOTE — TELEPHONE ENCOUNTER
From: Mervin Herndon  To: Dr. Corazon Angulo  Sent: 3/16/2024 11:58 AM EDT  Subject: Echo    I started taking the new medication on March 7th and I scheduled my echo but they scheduled it for March 22nd is that too early to do it

## 2024-03-25 ENCOUNTER — TELEPHONE (OUTPATIENT)
Dept: CARDIOLOGY CLINIC | Age: 49
End: 2024-03-25

## 2024-03-25 DIAGNOSIS — R06.02 SOB (SHORTNESS OF BREATH): ICD-10-CM

## 2024-03-25 DIAGNOSIS — I42.1 HOCM (HYPERTROPHIC OBSTRUCTIVE CARDIOMYOPATHY) (HCC): ICD-10-CM

## 2024-03-25 NOTE — TELEPHONE ENCOUNTER
[facility] needs new order on Mavacamten (CAMZYOS) 5 MG CAPS. Please fax to [phone]

## 2024-03-26 RX ORDER — MAVACAMTEN 5 MG/1
5 CAPSULE, GELATIN COATED ORAL DAILY
Qty: 30 CAPSULE | Refills: 2 | Status: SHIPPED | OUTPATIENT
Start: 2024-03-26

## 2024-04-05 ENCOUNTER — PROCEDURE VISIT (OUTPATIENT)
Dept: CARDIOLOGY CLINIC | Age: 49
End: 2024-04-05

## 2024-04-05 DIAGNOSIS — I42.1 HOCM (HYPERTROPHIC OBSTRUCTIVE CARDIOMYOPATHY) (HCC): ICD-10-CM

## 2024-04-05 NOTE — TELEPHONE ENCOUNTER
Pt stated that he spoke with Mo[facility] for medication refill and they advised him that it would cost around $300. Pt stated that when he spoke with robyn about it she advised him that it would cost around $10. Please advise.

## 2024-04-08 ENCOUNTER — TELEPHONE (OUTPATIENT)
Dept: CARDIOLOGY CLINIC | Age: 49
End: 2024-04-08

## 2024-04-08 DIAGNOSIS — I42.1 HOCM (HYPERTROPHIC OBSTRUCTIVE CARDIOMYOPATHY) (HCC): ICD-10-CM

## 2024-04-08 DIAGNOSIS — R06.02 SOB (SHORTNESS OF BREATH): ICD-10-CM

## 2024-04-08 RX ORDER — MAVACAMTEN 5 MG/1
5 CAPSULE, GELATIN COATED ORAL DAILY
Qty: 5 CAPSULE | Refills: 0 | Status: SHIPPED | OUTPATIENT
Start: 2024-04-08 | End: 2024-04-08

## 2024-04-08 NOTE — TELEPHONE ENCOUNTER
Spoke wit pt.'s wife. She says the have not received an email. Advised she F/U with the pharmacy to see if they can give her further direction on submitting the copay card.

## 2024-04-08 NOTE — TELEPHONE ENCOUNTER
----- Message from Corazon Angulo MD sent at 4/8/2024  3:21 PM EDT -----  Miriam, can you send in a REMS?  POLLY    Mr. Herndon, your echo looks really good.  Ejection fraction 60-65% which is normal and the pressure has dropped from the 70s to 35.  Good news.  We will continue the same dosage.  Corazon Angulo

## 2024-04-08 NOTE — TELEPHONE ENCOUNTER
REMS form completed. Spoke with Fracisco. Pt copay is $10, pharmacy is [facility]. Shipment scheduled for tomorrow. Pt aware

## 2024-04-08 NOTE — TELEPHONE ENCOUNTER
Pt contacted office, states he's unsure what he needs to do in regards of getting his camzyos filled. Pt requested to speak w/ Miriam, informed pt I would get message sent back to team.       [phone]

## 2024-04-08 NOTE — TELEPHONE ENCOUNTER
[facility] who said they have not received an enrollment form from pt. They are unable to provide final copay amount until pt completes their portion of the enrollment. Spoke with [facility] who said the pt should have received an email to complete copay assistance. Spoke with pt.'s wife (ok per HIPAA). Informed her she needs to complete enrollment for camzyos copay which should have been emailed to her. Once this is completed the final copay amount should be available. I expressed the urgency that this form be completed ASAP as we do not want him going without medication. Pt has one more camzyos tablet left of the free 30 day supply. I can send 5 day emergency supply.

## 2024-04-17 ENCOUNTER — PATIENT MESSAGE (OUTPATIENT)
Dept: CARDIOLOGY CLINIC | Age: 49
End: 2024-04-17

## 2024-04-17 DIAGNOSIS — Q24.8 LEFT VENTRICULAR OUTFLOW TRACT OBSTRUCTION: ICD-10-CM

## 2024-04-17 DIAGNOSIS — I42.1 HOCM (HYPERTROPHIC OBSTRUCTIVE CARDIOMYOPATHY) (HCC): Primary | ICD-10-CM

## 2024-04-17 NOTE — TELEPHONE ENCOUNTER
From: Mervin Herndon  To: Dr. Corazon Angulo  Sent: 4/17/2024 11:18 AM EDT  Subject: Echo    Do I need to schedule a echo in May

## 2024-05-02 ENCOUNTER — HOSPITAL ENCOUNTER (OUTPATIENT)
Dept: NON INVASIVE DIAGNOSTICS | Age: 49
Discharge: HOME | End: 2024-05-02
Payer: COMMERCIAL

## 2024-05-02 DIAGNOSIS — Q24.8 LEFT VENTRICULAR OUTFLOW TRACT OBSTRUCTION: ICD-10-CM

## 2024-05-02 DIAGNOSIS — I42.1 HOCM (HYPERTROPHIC OBSTRUCTIVE CARDIOMYOPATHY) (HCC): ICD-10-CM

## 2024-05-02 PROCEDURE — 93320 DOPPLER ECHO COMPLETE: CPT

## 2024-05-02 PROCEDURE — 93308 TTE F-UP OR LMTD: CPT

## 2024-05-02 PROCEDURE — 93325 DOPPLER ECHO COLOR FLOW MAPG: CPT

## 2024-05-03 ENCOUNTER — TELEPHONE (OUTPATIENT)
Dept: CARDIOLOGY CLINIC | Age: 49
End: 2024-05-03

## 2024-05-03 NOTE — TELEPHONE ENCOUNTER
Spoke with pt.'s wife (ok per HIPAA). She V/U of POLLY message. REMS form completed. Will call back next week once echo dates are available. She says pt is doing well. He notices he can walk further without getting as SOB

## 2024-05-03 NOTE — TELEPHONE ENCOUNTER
----- Message from Corazon Angulo MD sent at 5/2/2024  5:19 PM EDT -----  Miriam, see below.    Mr. Herndon, great news!  Your echo is already looking better!  Your heart function is normal and the pressure in your heart has dropped from 78 to 9!  This is really great news!  I hope that you are also feeling better with these changes.  We will continue the same dosage of Camzyos 5 mg.  Corazon Angulo

## 2024-05-20 NOTE — PROGRESS NOTES
joint complaints.  Integumentary: No rash or pruritis.  Neurological: No headache, diplopia, change in muscle strength, numbness or tingling. No change in gait, balance, coordination, mood, affect, memory, mentation, behavior.  Psychiatric: No anxiety, no depression.  Endocrine: No malaise, fatigue or temperature intolerance. No excessive thirst, fluid intake, or urination. No tremor.  Hematologic/Lymphatic: No abnormal bruising or bleeding, blood clots or swollen lymph nodes.  Allergic/Immunologic: No nasal congestion or hives.    Physical Examination:    Vitals:    05/21/24 0814 05/21/24 0818   BP: (!) 124/94 (!) 124/90   Pulse: 70    SpO2: 94%    Weight: 134.5 kg (296 lb 8 oz)    Height: 1.803 m (5' 11\")        Body mass index is 41.35 kg/m².     Wt Readings from Last 3 Encounters:   05/21/24 134.5 kg (296 lb 8 oz)   05/21/24 131.5 kg (290 lb)   02/20/24 136.1 kg (300 lb)     BP Readings from Last 3 Encounters:   05/21/24 (!) 124/90   05/21/24 111/79   02/20/24 111/78     Constitutional and General Appearance:   WD/WN in NAD  HEENT:  NC/AT  GORAN  No problems with hearing  Skin:  Warm, dry  Respiratory:  Normal excursion and expansion without use of accessory muscles  Resp Auscultation: Normal breath sounds without dullness  Cardiovascular:  The apical impulses not displaced  Heart tones are crisp and normal  Cervical veins are not engorged  The carotid upstroke is normal in amplitude and contour without delay or bruit  JVP less than 8 cm H2O  RRR with nl S1 and S2 with II/VI MONSE  Peripheral pulses are symmetrical and full  There is no clubbing, cyanosis of the extremities.  1+ bilateral edema  Femoral Arteries: 2+ and equal  Pedal Pulses: 2+ and equal   Neck:  No thyromegaly  Abdomen:  No masses or tenderness  Liver/Spleen: No Abnormalities Noted  Neurological/Psychiatric:  Alert and oriented in all spheres  Moves all extremities well  Exhibits normal gait balance and coordination  No abnormalities of mood,

## 2024-05-21 ENCOUNTER — HOSPITAL ENCOUNTER (OUTPATIENT)
Dept: CARDIOLOGY | Age: 49
Discharge: HOME | End: 2024-05-23
Attending: INTERNAL MEDICINE
Payer: COMMERCIAL

## 2024-05-21 ENCOUNTER — TELEPHONE (OUTPATIENT)
Dept: CARDIOLOGY CLINIC | Age: 49
End: 2024-05-21

## 2024-05-21 ENCOUNTER — OFFICE VISIT (OUTPATIENT)
Dept: CARDIOLOGY CLINIC | Age: 49
End: 2024-05-21

## 2024-05-21 VITALS
HEIGHT: 71 IN | WEIGHT: 296.5 LBS | OXYGEN SATURATION: 94 % | DIASTOLIC BLOOD PRESSURE: 90 MMHG | BODY MASS INDEX: 41.51 KG/M2 | SYSTOLIC BLOOD PRESSURE: 124 MMHG | HEART RATE: 70 BPM

## 2024-05-21 VITALS
DIASTOLIC BLOOD PRESSURE: 79 MMHG | WEIGHT: 290 LBS | SYSTOLIC BLOOD PRESSURE: 111 MMHG | HEIGHT: 71 IN | BODY MASS INDEX: 40.6 KG/M2

## 2024-05-21 DIAGNOSIS — Z79.899 MEDICATION MANAGEMENT: ICD-10-CM

## 2024-05-21 DIAGNOSIS — I34.0 NONRHEUMATIC MITRAL VALVE REGURGITATION: ICD-10-CM

## 2024-05-21 DIAGNOSIS — R06.02 SOB (SHORTNESS OF BREATH): ICD-10-CM

## 2024-05-21 DIAGNOSIS — Q24.8 LEFT VENTRICULAR OUTFLOW TRACT OBSTRUCTION: ICD-10-CM

## 2024-05-21 DIAGNOSIS — I42.1 HOCM (HYPERTROPHIC OBSTRUCTIVE CARDIOMYOPATHY) (HCC): Primary | ICD-10-CM

## 2024-05-21 DIAGNOSIS — Q61.3 POLYCYSTIC KIDNEY: ICD-10-CM

## 2024-05-21 DIAGNOSIS — I10 ESSENTIAL HYPERTENSION: ICD-10-CM

## 2024-05-21 DIAGNOSIS — I42.1 HOCM (HYPERTROPHIC OBSTRUCTIVE CARDIOMYOPATHY) (HCC): ICD-10-CM

## 2024-05-21 DIAGNOSIS — I42.2 HYPERTROPHIC CARDIOMYOPATHY (HCC): Primary | ICD-10-CM

## 2024-05-21 DIAGNOSIS — I42.2 HYPERTROPHIC CARDIOMYOPATHY (HCC): ICD-10-CM

## 2024-05-21 LAB
ECHO AO ROOT DIAM: 3.1 CM
ECHO AO ROOT INDEX: 1.25 CM/M2
ECHO AV MEAN GRADIENT: 7 MMHG
ECHO AV MEAN VELOCITY: 1.2 M/S
ECHO AV PEAK GRADIENT: 11 MMHG
ECHO AV PEAK VELOCITY: 1.7 M/S
ECHO AV VELOCITY RATIO: 0.82
ECHO AV VTI: 35.7 CM
ECHO BSA: 2.57 M2
ECHO LA DIAMETER INDEX: 2.1 CM/M2
ECHO LA DIAMETER: 5.2 CM
ECHO LA TO AORTIC ROOT RATIO: 1.68
ECHO LV FRACTIONAL SHORTENING: 30 % (ref 28–44)
ECHO LV INTERNAL DIMENSION DIASTOLE INDEX: 1.85 CM/M2
ECHO LV INTERNAL DIMENSION DIASTOLIC: 4.6 CM (ref 4.2–5.9)
ECHO LV INTERNAL DIMENSION SYSTOLIC INDEX: 1.29 CM/M2
ECHO LV INTERNAL DIMENSION SYSTOLIC: 3.2 CM
ECHO LV IVSD: 1.5 CM (ref 0.6–1)
ECHO LV MASS 2D: 270.6 G (ref 88–224)
ECHO LV MASS INDEX 2D: 109.1 G/M2 (ref 49–115)
ECHO LV POSTERIOR WALL DIASTOLIC: 1.4 CM (ref 0.6–1)
ECHO LV RELATIVE WALL THICKNESS RATIO: 0.61
ECHO LVOT AV VTI INDEX: 0.98
ECHO LVOT MEAN GRADIENT: 5 MMHG
ECHO LVOT PEAK GRADIENT VALSALVA: 9 MMHG
ECHO LVOT PEAK GRADIENT: 8 MMHG
ECHO LVOT PEAK VELOCITY: 1.4 M/S
ECHO LVOT VTI: 35.1 CM

## 2024-05-21 PROCEDURE — 93321 DOPPLER ECHO F-UP/LMTD STD: CPT

## 2024-05-21 NOTE — TELEPHONE ENCOUNTER
Pt informed in office today. REMS form completed. Next echo 8/14/24-8/23/24. Informed pt.'s wife of echo dates. She V/U. Attempted to schedule POLLY 3 month F/U and Echo same day, however, pt was not available due to work. Pt will call to schedule next echo

## 2024-05-21 NOTE — TELEPHONE ENCOUNTER
----- Message from Corazon Angulo MD sent at 5/21/2024  3:18 PM EDT -----  Miriam, please send in REMS.  POLLY    Mr. Herndon, great result on your echo!  Heart function is normal and highest pressure is 9, down from 78.  This is great news.  We will continue the same dose of Camzyos.  Corazon Angulo

## 2024-05-21 NOTE — PATIENT INSTRUCTIONS
Plan:  Continue current cardiac medications  Labs now: Vitamin D, BNP, CMP, CBC, CPK  Recommend establishing care with a new PCP  6 minute fernandez walk at next visit  Follow up in 3 months with echo

## 2024-05-22 ENCOUNTER — HOSPITAL ENCOUNTER (OUTPATIENT)
Age: 49
Discharge: HOME | End: 2024-05-22
Payer: COMMERCIAL

## 2024-05-22 DIAGNOSIS — Z79.899 MEDICATION MANAGEMENT: ICD-10-CM

## 2024-05-22 DIAGNOSIS — I10 ESSENTIAL HYPERTENSION: ICD-10-CM

## 2024-05-22 DIAGNOSIS — Q24.8 LEFT VENTRICULAR OUTFLOW TRACT OBSTRUCTION: ICD-10-CM

## 2024-05-22 DIAGNOSIS — I42.1 HOCM (HYPERTROPHIC OBSTRUCTIVE CARDIOMYOPATHY) (HCC): ICD-10-CM

## 2024-05-22 LAB
25(OH)D3 SERPL-MCNC: 20.8 NG/ML
ALBUMIN SERPL-MCNC: 4.1 G/DL (ref 3.4–5)
ALBUMIN/GLOB SERPL: 1.6 {RATIO} (ref 1.1–2.2)
ALP SERPL-CCNC: 85 U/L (ref 40–129)
ALT SERPL-CCNC: 26 U/L (ref 10–40)
ANION GAP SERPL CALCULATED.3IONS-SCNC: 13 MMOL/L (ref 3–16)
AST SERPL-CCNC: 19 U/L (ref 15–37)
BASOPHILS # BLD: 0.1 K/UL (ref 0–0.2)
BASOPHILS NFR BLD: 1.2 %
BILIRUB SERPL-MCNC: 0.3 MG/DL (ref 0–1)
BUN SERPL-MCNC: 11 MG/DL (ref 7–20)
CALCIUM SERPL-MCNC: 8.9 MG/DL (ref 8.3–10.6)
CHLORIDE SERPL-SCNC: 104 MMOL/L (ref 99–110)
CK SERPL-CCNC: 129 U/L (ref 39–308)
CO2 SERPL-SCNC: 22 MMOL/L (ref 21–32)
CREAT SERPL-MCNC: 0.8 MG/DL (ref 0.9–1.3)
DEPRECATED RDW RBC AUTO: 12.6 % (ref 12.4–15.4)
EOSINOPHIL # BLD: 0.3 K/UL (ref 0–0.6)
EOSINOPHIL NFR BLD: 4.8 %
GFR SERPLBLD CREATININE-BSD FMLA CKD-EPI: >90 ML/MIN/{1.73_M2}
GLUCOSE SERPL-MCNC: 90 MG/DL (ref 70–99)
HCT VFR BLD AUTO: 46.4 % (ref 40.5–52.5)
HGB BLD-MCNC: 16.3 G/DL (ref 13.5–17.5)
LYMPHOCYTES # BLD: 3.1 K/UL (ref 1–5.1)
LYMPHOCYTES NFR BLD: 42.9 %
MCH RBC QN AUTO: 32.7 PG (ref 26–34)
MCHC RBC AUTO-ENTMCNC: 35.3 G/DL (ref 31–36)
MCV RBC AUTO: 92.6 FL (ref 80–100)
MONOCYTES # BLD: 0.8 K/UL (ref 0–1.3)
MONOCYTES NFR BLD: 10.5 %
NEUTROPHILS # BLD: 2.9 K/UL (ref 1.7–7.7)
NEUTROPHILS NFR BLD: 40.6 %
NT-PROBNP SERPL-MCNC: 84 PG/ML (ref 0–124)
PLATELET # BLD AUTO: 178 K/UL (ref 135–450)
PMV BLD AUTO: 9.3 FL (ref 5–10.5)
POTASSIUM SERPL-SCNC: 4 MMOL/L (ref 3.5–5.1)
PROT SERPL-MCNC: 6.6 G/DL (ref 6.4–8.2)
RBC # BLD AUTO: 5.01 M/UL (ref 4.2–5.9)
SODIUM SERPL-SCNC: 139 MMOL/L (ref 136–145)
WBC # BLD AUTO: 7.3 K/UL (ref 4–11)

## 2024-05-22 PROCEDURE — 85025 COMPLETE CBC W/AUTO DIFF WBC: CPT

## 2024-05-22 PROCEDURE — 83880 ASSAY OF NATRIURETIC PEPTIDE: CPT

## 2024-05-22 PROCEDURE — 36415 COLL VENOUS BLD VENIPUNCTURE: CPT

## 2024-05-22 PROCEDURE — 82306 VITAMIN D 25 HYDROXY: CPT

## 2024-05-22 PROCEDURE — 80053 COMPREHEN METABOLIC PANEL: CPT

## 2024-05-22 PROCEDURE — 82550 ASSAY OF CK (CPK): CPT

## 2024-05-24 ENCOUNTER — TELEPHONE (OUTPATIENT)
Dept: CARDIOLOGY CLINIC | Age: 49
End: 2024-05-24

## 2024-05-24 NOTE — TELEPHONE ENCOUNTER
----- Message from Corazon Angulo MD sent at 5/23/2024  1:07 PM EDT -----  Please call patient and make sure he gets this message.  POLLY    Mr. Herndon, your labs look very good except your vitamin D level is quite low.  I want you to start taking vitamin D capsules (over the counter).  I want you to take 2 capsules daily for a total of 4000 units a day.  I will have the office call you to be sure you received this message.  Corazon Angulo

## 2024-06-01 ENCOUNTER — HOSPITAL ENCOUNTER (EMERGENCY)
Age: 49
Discharge: HOME | End: 2024-06-01
Attending: EMERGENCY MEDICINE
Payer: COMMERCIAL

## 2024-06-01 VITALS
RESPIRATION RATE: 18 BRPM | SYSTOLIC BLOOD PRESSURE: 141 MMHG | OXYGEN SATURATION: 96 % | HEART RATE: 81 BPM | BODY MASS INDEX: 41.3 KG/M2 | WEIGHT: 295 LBS | TEMPERATURE: 98.7 F | DIASTOLIC BLOOD PRESSURE: 84 MMHG | HEIGHT: 71 IN

## 2024-06-01 DIAGNOSIS — M79.662 PAIN OF LEFT CALF: ICD-10-CM

## 2024-06-01 DIAGNOSIS — L03.116 CELLULITIS OF LEFT LOWER EXTREMITY: Primary | ICD-10-CM

## 2024-06-01 LAB
ALBUMIN SERPL-MCNC: 4.2 G/DL (ref 3.4–5)
ALBUMIN/GLOB SERPL: 1.8 {RATIO} (ref 1.1–2.2)
ALP SERPL-CCNC: 81 U/L (ref 40–129)
ALT SERPL-CCNC: 22 U/L (ref 10–40)
ANION GAP SERPL CALCULATED.3IONS-SCNC: 8 MMOL/L (ref 3–16)
AST SERPL-CCNC: 19 U/L (ref 15–37)
BASOPHILS # BLD: 0.1 K/UL (ref 0–0.2)
BASOPHILS NFR BLD: 1.6 %
BILIRUB SERPL-MCNC: 0.8 MG/DL (ref 0–1)
BUN SERPL-MCNC: 14 MG/DL (ref 7–20)
CALCIUM SERPL-MCNC: 8.9 MG/DL (ref 8.3–10.6)
CHLORIDE SERPL-SCNC: 103 MMOL/L (ref 99–110)
CO2 SERPL-SCNC: 26 MMOL/L (ref 21–32)
CREAT SERPL-MCNC: 0.9 MG/DL (ref 0.9–1.3)
DEPRECATED RDW RBC AUTO: 12.8 % (ref 12.4–15.4)
EOSINOPHIL # BLD: 0.3 K/UL (ref 0–0.6)
EOSINOPHIL NFR BLD: 3.4 %
GFR SERPLBLD CREATININE-BSD FMLA CKD-EPI: >90 ML/MIN/{1.73_M2}
GLUCOSE SERPL-MCNC: 137 MG/DL (ref 70–99)
HCT VFR BLD AUTO: 47.5 % (ref 40.5–52.5)
HGB BLD-MCNC: 16.4 G/DL (ref 13.5–17.5)
LYMPHOCYTES # BLD: 2.1 K/UL (ref 1–5.1)
LYMPHOCYTES NFR BLD: 24.9 %
MCH RBC QN AUTO: 32.3 PG (ref 26–34)
MCHC RBC AUTO-ENTMCNC: 34.6 G/DL (ref 31–36)
MCV RBC AUTO: 93.5 FL (ref 80–100)
MONOCYTES # BLD: 0.6 K/UL (ref 0–1.3)
MONOCYTES NFR BLD: 7.8 %
NEUTROPHILS # BLD: 5.2 K/UL (ref 1.7–7.7)
NEUTROPHILS NFR BLD: 62.3 %
PLATELET # BLD AUTO: 162 K/UL (ref 135–450)
PMV BLD AUTO: 9 FL (ref 5–10.5)
POTASSIUM SERPL-SCNC: 4.1 MMOL/L (ref 3.5–5.1)
PROT SERPL-MCNC: 6.6 G/DL (ref 6.4–8.2)
RBC # BLD AUTO: 5.08 M/UL (ref 4.2–5.9)
SODIUM SERPL-SCNC: 137 MMOL/L (ref 136–145)
WBC # BLD AUTO: 8.3 K/UL (ref 4–11)

## 2024-06-01 PROCEDURE — 6370000000 HC RX 637 (ALT 250 FOR IP): Performed by: STUDENT IN AN ORGANIZED HEALTH CARE EDUCATION/TRAINING PROGRAM

## 2024-06-01 PROCEDURE — 85025 COMPLETE CBC W/AUTO DIFF WBC: CPT

## 2024-06-01 PROCEDURE — 36415 COLL VENOUS BLD VENIPUNCTURE: CPT

## 2024-06-01 PROCEDURE — 99283 EMERGENCY DEPT VISIT LOW MDM: CPT

## 2024-06-01 PROCEDURE — 80053 COMPREHEN METABOLIC PANEL: CPT

## 2024-06-01 RX ORDER — CEPHALEXIN 500 MG/1
500 CAPSULE ORAL 4 TIMES DAILY
Qty: 28 CAPSULE | Refills: 0 | Status: SHIPPED | OUTPATIENT
Start: 2024-06-01 | End: 2024-06-08

## 2024-06-01 RX ORDER — CEPHALEXIN 250 MG/1
500 CAPSULE ORAL ONCE
Status: COMPLETED | OUTPATIENT
Start: 2024-06-01 | End: 2024-06-01

## 2024-06-01 RX ADMIN — CEPHALEXIN 500 MG: 250 CAPSULE ORAL at 12:08

## 2024-06-01 ASSESSMENT — PAIN - FUNCTIONAL ASSESSMENT: PAIN_FUNCTIONAL_ASSESSMENT: 0-10

## 2024-06-01 ASSESSMENT — PAIN DESCRIPTION - LOCATION: LOCATION: LEG

## 2024-06-01 ASSESSMENT — PAIN DESCRIPTION - PAIN TYPE: TYPE: ACUTE PAIN

## 2024-06-01 ASSESSMENT — PAIN SCALES - GENERAL: PAINLEVEL_OUTOF10: 8

## 2024-06-01 ASSESSMENT — PAIN DESCRIPTION - ORIENTATION: ORIENTATION: LEFT

## 2024-06-01 NOTE — ED PROVIDER NOTES
EMERGENCY DEPARTMENT ENCOUNTER     [facility]  ED     Pt Name: Mervin Herndon   MRN: [id number]   Birthdate 1975   Date of evaluation: 6/1/2024   Provider: Michael Howell MD   PCP: No primary care provider on file.   Note Started: 11:37 AM EDT 6/1/24     CHIEF COMPLAINT     Chief Complaint   Patient presents with    Leg Pain     CALF PAIN SINCE TUESDAY        HISTORY OF PRESENT ILLNESS:  History from : Patient   Limitations to history : None     Mervin Herndon is a 48 y.o. male who presents with left calf pain since Tuesday. Patient states he started having cramping like pain in his left calf and thought it was charley horse. It has gotten progressively worse since that time. He has also noticed some redness and swelling from the area as well. He denies fevers, chills, vomiting. He had some nausea on Thursday. Patient has history of Afib that he takes Eliquis for. Previous CVA and that he has partial blindness from.    Nursing Notes were all reviewed and agreed with or any disagreements were addressed in the HPI.     ROS: Positives and Pertinent negatives as per HPI.    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    Past surgical history:  has a past surgical history that includes Cardiac catheterization (2008); other surgical history (2001); Colonoscopy (09/15/2020); Colonoscopy (N/A, 09/15/2020); and Cardiac catheterization (03/22/2021).      PHYSICAL EXAM:  ED Triage Vitals   BP Temp Temp Source Pulse Respirations SpO2 Height Weight - Scale   06/01/24 1039 06/01/24 1039 06/01/24 1039 06/01/24 1039 06/01/24 1039  occasionally words are mis-transcribed.)     Michael Howell MD (electronically signed)

## 2024-06-14 DIAGNOSIS — R06.02 SOB (SHORTNESS OF BREATH): ICD-10-CM

## 2024-06-14 DIAGNOSIS — I42.1 HOCM (HYPERTROPHIC OBSTRUCTIVE CARDIOMYOPATHY) (HCC): ICD-10-CM

## 2024-06-18 RX ORDER — MAVACAMTEN 5 MG/1
CAPSULE, GELATIN COATED ORAL
Qty: 30 CAPSULE | Refills: 2 | Status: SHIPPED | OUTPATIENT
Start: 2024-06-18

## 2024-07-24 ENCOUNTER — TELEPHONE (OUTPATIENT)
Dept: CARDIOLOGY CLINIC | Age: 49
End: 2024-07-24

## 2024-07-24 NOTE — TELEPHONE ENCOUNTER
Pt spouse presented herself in office stating pt needs to r/s August 13 appt w/ POLLY due to needing echo done and next available is in October. Does pt need over book or can pt r/s to next available. Please advise    Call back: [phone]

## 2024-07-24 NOTE — TELEPHONE ENCOUNTER
Let's just have him get echo in August and reschedule with me in a month or so, September/October.  POLLY

## 2024-07-24 NOTE — TELEPHONE ENCOUNTER
Pt.'s wife reports pt now has to work 8/13. Pt has a camzyos echo scheduled 8/19. Do you want to provide an overbook time for 8/20 OV? POLLY next available 10/1    Carlos MATIAS 5/21/24

## 2024-07-25 NOTE — TELEPHONE ENCOUNTER
7/25- called pt @[phone] unable to lvm due to vm box being full. Pt needs appt with POLLY next available.

## 2024-07-29 ENCOUNTER — TELEPHONE (OUTPATIENT)
Dept: ADMINISTRATIVE | Age: 49
End: 2024-07-29

## 2024-07-29 NOTE — TELEPHONE ENCOUNTER
Submitted PA for CAMZYOS  [facility] Key: [id number] STATUS: PENDING.    Follow up done daily; if no decision with in three days we will refax.  If another three days goes by with no decision will call the insurance for status.

## 2024-07-29 NOTE — TELEPHONE ENCOUNTER
7/29- second attempt. called pt @[phone] [facility] informing pt to call back to schedule a f/u appt with POLLY. Next step will be to send a letter.

## 2024-07-31 NOTE — TELEPHONE ENCOUNTER
7.31.2024- third attempt    Called pt at [phone], LVM to call and r/s POLLY appt  Mailing letter

## 2024-08-07 ENCOUNTER — TELEPHONE (OUTPATIENT)
Dept: CARDIOLOGY CLINIC | Age: 49
End: 2024-08-07

## 2024-08-13 ENCOUNTER — PATIENT MESSAGE (OUTPATIENT)
Dept: CARDIOLOGY CLINIC | Age: 49
End: 2024-08-13

## 2024-08-13 DIAGNOSIS — I48.3 TYPICAL ATRIAL FLUTTER (HCC): ICD-10-CM

## 2024-09-13 DIAGNOSIS — R06.02 SOB (SHORTNESS OF BREATH): ICD-10-CM

## 2024-09-13 DIAGNOSIS — I42.1 HOCM (HYPERTROPHIC OBSTRUCTIVE CARDIOMYOPATHY) (HCC): ICD-10-CM

## 2024-09-13 RX ORDER — MAVACAMTEN 5 MG/1
CAPSULE, GELATIN COATED ORAL
Qty: 30 CAPSULE | Refills: 2 | Status: SHIPPED | OUTPATIENT
Start: 2024-09-13

## 2024-09-17 ENCOUNTER — TELEPHONE (OUTPATIENT)
Dept: CARDIOLOGY CLINIC | Age: 49
End: 2024-09-17

## 2024-09-17 DIAGNOSIS — I42.1 HOCM (HYPERTROPHIC OBSTRUCTIVE CARDIOMYOPATHY) (HCC): Primary | ICD-10-CM

## 2024-09-17 DIAGNOSIS — Q24.8 LEFT VENTRICULAR OUTFLOW TRACT OBSTRUCTION: ICD-10-CM

## 2024-11-11 NOTE — PROGRESS NOTES
[facility]  Advanced CHF/Pulmonary Hypertension   Cardiac Evaluation      Mervin Herndon  YOB: 1975    Date of Visit:  11/12/24      Chief Complaint   Patient presents with    Follow-up    Other     HOCM (hypertrophic obstructive cardiomyopathy)        History of Present Illness:  Mervin Herndon is a 49 y.o. male who presents from referral from Dr. Renteria for consultation and management of possible HOCM. He has a history of LVH, HTN, SVT, atrial flutter, tobacco abuse.     Echo 11/7/2023 EF 65%, moderate to severe LVH, LOVT gradients at rest is 26 mmHg and the mean gradient with valsalva is 78 mmHg.    OV, 2/13/2024, He is here with his wife for HOCM evaluation. He reports a history of cardiac murmur for the past 2 years. He reports he feels SOB and winded with minimal activity. He has noticed increased swelling in his hands and feet. He reports dizziness with position changes. He reports a history of stroke which lead to partial vision loss in his left eye. He says this occurred after having an ablation. He says his father, dads brothers and paternal grandfather all had heart attacks in their 50's. He has a younger brother and sister with no known cardiac complication. He has two sons. He works as a  on the [employer]. He says he needs to report and new medical conditions for his license to work. He works 7 days on and 7 days off.     LOV, 5/21/2024, He reports he is still working. He says he has noticed an improvement in activity tolerance since starting camzyos. He reports he is able to walk and exert himself without getting SOB. He reports muscle aches.     Today, 11/12/2024, He reports he has some limitations with activity due to severe muscle fatigue. He reports swelling in his feet when sitting for extended periods. He is still working full time. He reports he will have to submit his medical diagnoses next year to the [employer]. He does not have a

## 2024-11-12 ENCOUNTER — OFFICE VISIT (OUTPATIENT)
Dept: CARDIOLOGY CLINIC | Age: 49
End: 2024-11-12

## 2024-11-12 VITALS
WEIGHT: 292 LBS | HEIGHT: 71 IN | DIASTOLIC BLOOD PRESSURE: 80 MMHG | OXYGEN SATURATION: 95 % | BODY MASS INDEX: 40.88 KG/M2 | SYSTOLIC BLOOD PRESSURE: 116 MMHG | HEART RATE: 73 BPM

## 2024-11-12 DIAGNOSIS — I42.1 HOCM (HYPERTROPHIC OBSTRUCTIVE CARDIOMYOPATHY) (HCC): Primary | ICD-10-CM

## 2024-11-12 DIAGNOSIS — Q61.3 POLYCYSTIC KIDNEY: ICD-10-CM

## 2024-11-12 DIAGNOSIS — Z79.899 MEDICATION MANAGEMENT: ICD-10-CM

## 2024-11-12 DIAGNOSIS — E55.9 VITAMIN D DEFICIENCY: ICD-10-CM

## 2024-11-12 DIAGNOSIS — R06.02 SOB (SHORTNESS OF BREATH): ICD-10-CM

## 2024-11-12 DIAGNOSIS — N18.2 STAGE 2 CHRONIC KIDNEY DISEASE: ICD-10-CM

## 2024-11-12 DIAGNOSIS — Q24.8 LEFT VENTRICULAR OUTFLOW TRACT OBSTRUCTION: ICD-10-CM

## 2024-11-12 DIAGNOSIS — I48.3 TYPICAL ATRIAL FLUTTER (HCC): ICD-10-CM

## 2024-11-12 DIAGNOSIS — I10 ESSENTIAL HYPERTENSION: ICD-10-CM

## 2024-11-12 RX ORDER — METOPROLOL SUCCINATE 50 MG/1
50 TABLET, EXTENDED RELEASE ORAL DAILY
Qty: 90 TABLET | Refills: 3 | Status: SHIPPED | OUTPATIENT
Start: 2024-11-12

## 2024-11-12 RX ORDER — ATORVASTATIN CALCIUM 40 MG/1
40 TABLET, FILM COATED ORAL NIGHTLY
Qty: 90 TABLET | Refills: 3 | Status: CANCELLED | OUTPATIENT
Start: 2024-11-12

## 2024-11-12 RX ORDER — LOSARTAN POTASSIUM 100 MG/1
100 TABLET ORAL DAILY
Qty: 90 TABLET | Refills: 3 | Status: SHIPPED | OUTPATIENT
Start: 2024-11-12

## 2024-11-12 NOTE — PATIENT INSTRUCTIONS
Plan:  Stop atorvastatin (lipitor) for 1 month to see if this helps with muscle fatigue  Let me know if this helps and we may discuss alternative medications for cholesterol management   Continue all other cardiac medications  Referral to Dr. Saleh for polycystic kidney management  Labs  month: fasting lipids, cbc, cmp, bnp, vitamin d  Follow up in 6 months       Next echo February 2/4-2/14

## 2024-11-13 ENCOUNTER — HOSPITAL ENCOUNTER (OUTPATIENT)
Age: 49
Discharge: HOME | End: 2024-11-13
Payer: COMMERCIAL

## 2024-11-13 DIAGNOSIS — E55.9 VITAMIN D DEFICIENCY: ICD-10-CM

## 2024-11-13 DIAGNOSIS — I10 ESSENTIAL HYPERTENSION: ICD-10-CM

## 2024-11-13 DIAGNOSIS — Z79.899 MEDICATION MANAGEMENT: ICD-10-CM

## 2024-11-13 LAB
25(OH)D3 SERPL-MCNC: 19 NG/ML
ALBUMIN SERPL-MCNC: 4 G/DL (ref 3.4–5)
ALBUMIN/GLOB SERPL: 1.4 {RATIO} (ref 1.1–2.2)
ALP SERPL-CCNC: 84 U/L (ref 40–129)
ALT SERPL-CCNC: 18 U/L (ref 10–40)
ANION GAP SERPL CALCULATED.3IONS-SCNC: 12 MMOL/L (ref 3–16)
AST SERPL-CCNC: 17 U/L (ref 15–37)
BASOPHILS # BLD: 0.1 K/UL (ref 0–0.2)
BASOPHILS NFR BLD: 0.8 %
BILIRUB SERPL-MCNC: 0.5 MG/DL (ref 0–1)
BUN SERPL-MCNC: 9 MG/DL (ref 7–20)
CALCIUM SERPL-MCNC: 8.5 MG/DL (ref 8.3–10.6)
CHLORIDE SERPL-SCNC: 105 MMOL/L (ref 99–110)
CHOLEST SERPL-MCNC: 108 MG/DL (ref 0–199)
CO2 SERPL-SCNC: 23 MMOL/L (ref 21–32)
CREAT SERPL-MCNC: 0.7 MG/DL (ref 0.9–1.3)
DEPRECATED RDW RBC AUTO: 12.7 % (ref 12.4–15.4)
EOSINOPHIL # BLD: 0.3 K/UL (ref 0–0.6)
EOSINOPHIL NFR BLD: 3.5 %
GFR SERPLBLD CREATININE-BSD FMLA CKD-EPI: >90 ML/MIN/{1.73_M2}
GLUCOSE SERPL-MCNC: 87 MG/DL (ref 70–99)
HCT VFR BLD AUTO: 53.4 % (ref 40.5–52.5)
HDLC SERPL-MCNC: 25 MG/DL (ref 40–60)
HGB BLD-MCNC: 18.2 G/DL (ref 13.5–17.5)
LDLC SERPL CALC-MCNC: 62 MG/DL
LYMPHOCYTES # BLD: 2.4 K/UL (ref 1–5.1)
LYMPHOCYTES NFR BLD: 32.1 %
MCH RBC QN AUTO: 32.4 PG (ref 26–34)
MCHC RBC AUTO-ENTMCNC: 34 G/DL (ref 31–36)
MCV RBC AUTO: 95.3 FL (ref 80–100)
MONOCYTES # BLD: 0.7 K/UL (ref 0–1.3)
MONOCYTES NFR BLD: 9.7 %
NEUTROPHILS # BLD: 4 K/UL (ref 1.7–7.7)
NEUTROPHILS NFR BLD: 53.9 %
NT-PROBNP SERPL-MCNC: 49 PG/ML (ref 0–124)
PLATELET # BLD AUTO: 176 K/UL (ref 135–450)
PMV BLD AUTO: 9.8 FL (ref 5–10.5)
POTASSIUM SERPL-SCNC: 4.2 MMOL/L (ref 3.5–5.1)
PROT SERPL-MCNC: 6.9 G/DL (ref 6.4–8.2)
RBC # BLD AUTO: 5.61 M/UL (ref 4.2–5.9)
SODIUM SERPL-SCNC: 140 MMOL/L (ref 136–145)
TRIGL SERPL-MCNC: 103 MG/DL (ref 0–150)
VLDLC SERPL CALC-MCNC: 21 MG/DL
WBC # BLD AUTO: 7.5 K/UL (ref 4–11)

## 2024-11-13 PROCEDURE — 85025 COMPLETE CBC W/AUTO DIFF WBC: CPT

## 2024-11-13 PROCEDURE — 80053 COMPREHEN METABOLIC PANEL: CPT

## 2024-11-13 PROCEDURE — 80061 LIPID PANEL: CPT

## 2024-11-13 PROCEDURE — 83880 ASSAY OF NATRIURETIC PEPTIDE: CPT

## 2024-11-13 PROCEDURE — 82306 VITAMIN D 25 HYDROXY: CPT

## 2024-11-13 PROCEDURE — 36415 COLL VENOUS BLD VENIPUNCTURE: CPT

## 2024-11-14 DIAGNOSIS — E55.9 VITAMIN D DEFICIENCY: Primary | ICD-10-CM

## 2024-11-14 RX ORDER — ERGOCALCIFEROL 1.25 MG/1
50000 CAPSULE, LIQUID FILLED ORAL WEEKLY
Qty: 12 CAPSULE | Refills: 1 | Status: SHIPPED | OUTPATIENT
Start: 2024-11-14

## 2024-12-11 DIAGNOSIS — I42.1 HOCM (HYPERTROPHIC OBSTRUCTIVE CARDIOMYOPATHY) (HCC): ICD-10-CM

## 2024-12-11 DIAGNOSIS — R06.02 SOB (SHORTNESS OF BREATH): ICD-10-CM

## 2024-12-13 RX ORDER — MAVACAMTEN 5 MG/1
CAPSULE, GELATIN COATED ORAL
Qty: 30 CAPSULE | Refills: 2 | Status: SHIPPED | OUTPATIENT
Start: 2024-12-13

## 2025-01-07 ENCOUNTER — HOSPITAL ENCOUNTER (EMERGENCY)
Age: 50
Discharge: HOME | End: 2025-01-07
Attending: STUDENT IN AN ORGANIZED HEALTH CARE EDUCATION/TRAINING PROGRAM
Payer: COMMERCIAL

## 2025-01-07 ENCOUNTER — APPOINTMENT (OUTPATIENT)
Dept: GENERAL RADIOLOGY | Age: 50
End: 2025-01-07
Payer: COMMERCIAL

## 2025-01-07 VITALS
SYSTOLIC BLOOD PRESSURE: 129 MMHG | TEMPERATURE: 98.3 F | WEIGHT: 297.9 LBS | BODY MASS INDEX: 41.71 KG/M2 | RESPIRATION RATE: 18 BRPM | HEART RATE: 80 BPM | HEIGHT: 71 IN | OXYGEN SATURATION: 92 % | DIASTOLIC BLOOD PRESSURE: 91 MMHG
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
## 2025-01-07 DIAGNOSIS — M25.561 ACUTE PAIN OF RIGHT KNEE: ICD-10-CM

## 2025-01-07 PROCEDURE — 73562 X-RAY EXAM OF KNEE 3: CPT

## 2025-01-07 PROCEDURE — 73590 X-RAY EXAM OF LOWER LEG: CPT

## 2025-01-07 PROCEDURE — 99283 EMERGENCY DEPT VISIT LOW MDM: CPT

## 2025-01-07 ASSESSMENT — PAIN - FUNCTIONAL ASSESSMENT: PAIN_FUNCTIONAL_ASSESSMENT: 0-10

## 2025-01-07 ASSESSMENT — PAIN DESCRIPTION - LOCATION: LOCATION: KNEE

## 2025-01-07 ASSESSMENT — PAIN SCALES - GENERAL: PAINLEVEL_OUTOF10: 5

## 2025-01-07 ASSESSMENT — PAIN DESCRIPTION - ORIENTATION: ORIENTATION: RIGHT

## 2025-01-07 NOTE — ED PROVIDER NOTES
FIBULA RIGHT (2 VIEWS) [NG]   1106 XR KNEE RIGHT (3 VIEWS)  X-ray imaging is negative for fracture.  This time I find no indication for further testing or treatment in the emerge department patient appropriate discharge stable condition routine outpatient care of low risk musculoskeletal injury of the right knee [NG]      ED Course User Index  [NG] Eliseo Angel MD       Additional Reassessment    Is this patient to be included in the SEP-1 core measure? No Exclusion criteria - the patient is NOT to be included for SEP-1 Core Measure due to: Infection is not suspected    Medical Decision Making  Presentation for fall with right knee contusion no evidence of fracture amenable to routine outpatient care.    Problems Addressed:  Acute pain of right knee: acute illness or injury  Fall, initial encounter: acute illness or injury    Amount and/or Complexity of Data Reviewed  Radiology: ordered and independent interpretation performed. Decision-making details documented in ED Course.    Risk  Decision regarding hospitalization.          The patient was given the followingmedications:  No orders of the defined types were placed in this encounter.      CONSULTS:  None      CRITICAL CARE TIME   Total Critical Care time was 0 minutes, excluding separately reportable procedures in the context of the following condition na.  There was a high probability of clinically significant/life threatening deterioration in the patient's condition which required my urgent intervention.    Clinical Impression     1. Fall, initial encounter    2. Acute pain of right knee        Disposition     PATIENT REFERRED TO:  No follow-up provider specified.    DISCHARGE MEDICATIONS:  New Prescriptions    No medications on file       DISPOSITION Decision To Discharge 01/07/2025 10:34:22 AM   DISPOSITION CONDITION Stable           Eliseo Angel MD (electronically signed)  Attending Emergency Physician    Please note this documentation has been produced

## 2025-01-07 NOTE — DISCHARGE INSTRUCTIONS
You were evaluated in the emergency department for fall with knee injury. Assessments and testing completed during your visit were reassuring and at this time there is no indication for further testing, treatment or admission to the hospital. Given this it is appropriate to discharge you from the emergency department. At the time of discharge we discussed the following:    I expect your condition to continue to improve please follow-up your primary for further recommendations as needed.    Please note that sometimes it is difficult to diagnose a medical condition early in the disease process before the disease is fully manifest. Because of this, should you develop any new or worsening symptoms, you may return at any time to the emergency department for another evaluation. If available you are also recommended to review this visit with your primary care physician or other medical provider in the next 7 days. Thank you for allowing us to care for you today.

## 2025-01-09 ENCOUNTER — OFFICE VISIT (OUTPATIENT)
Dept: ORTHOPEDIC SURGERY | Age: 50
End: 2025-01-09
Payer: COMMERCIAL

## 2025-01-09 VITALS — HEIGHT: 71 IN | BODY MASS INDEX: 41.58 KG/M2 | WEIGHT: 297 LBS

## 2025-01-09 DIAGNOSIS — M25.561 RIGHT KNEE PAIN, UNSPECIFIED CHRONICITY: Primary | ICD-10-CM

## 2025-01-09 PROCEDURE — 99204 OFFICE O/P NEW MOD 45 MIN: CPT | Performed by: STUDENT IN AN ORGANIZED HEALTH CARE EDUCATION/TRAINING PROGRAM

## 2025-01-09 RX ORDER — METHYLPREDNISOLONE 4 MG/1
TABLET ORAL
Qty: 21 KIT | Refills: 0 | Status: SHIPPED | OUTPATIENT
Start: 2025-01-09 | End: 2025-01-15

## 2025-01-09 NOTE — PROGRESS NOTES
Codeine Rash       Vital signs:  Ht 1.803 m (5' 11\")   Wt 134.7 kg (297 lb)   BMI 41.42 kg/m²        Right knee exam    Limited exam due to pain.  Very tender over the lateral joint line.  Positive Apley grind and Soha.  Healing abrasions noted over the anterior lateral aspect of the knee.  Mild effusion appreciated.  Stable to anterior and posterior drawer.  Stable to varus and valgus stress.  Knee range of motion 0 to 110 degrees        Diagnostics:  Radiology:       2 views of the right knee taken in the office today demonstrate no acute osseous abnormalities with well-preserved medial lateral and patellofemoral joint spaces      Assessment: 49 y.o. male with acute right knee injury concern for lateral meniscus    Plan: Pertinent imaging was reviewed. The etiology, natural history, and treatment options for the disorder were discussed.  The roles of activity medication, antiinflammatories, injections, bracing, physical therapy, and surgical interventions were all described to the patient and questions were answered.    I will start the patient on an oral steroid to help with his inflammation given his history of blood thinner use, kidney and heart disease.  I will order an MRI to rule out a lateral meniscus tear as the patient has mechanical symptoms such as instability and worsening lateral joint line pain.  Follow-up after the MRI to go over the results    . Mervin Herndon is in agreement with this plan. All questions were answered to patient's satisfaction and was encouraged to call with any further questions.       Tony Olivares, DO  Orthopedic Surgery and Sports Medicine  1/9/2025    Orders Placed This Encounter   Procedures    XR KNEE RIGHT (1-2 VIEWS)     Standing Status:   Future     Number of Occurrences:   1     Standing Expiration Date:   1/8/2026    MRI KNEE RIGHT WO CONTRAST     Standing Status:   Future     Standing Expiration Date:   1/9/2026     Scheduling Instructions:      MRI RT.

## 2025-01-20 ENCOUNTER — TELEPHONE (OUTPATIENT)
Dept: ORTHOPEDIC SURGERY | Age: 50
End: 2025-01-20

## 2025-01-20 NOTE — TELEPHONE ENCOUNTER
MRI RIGHT KNEE approved auth# [id number]     Was approved for [facility] due to insurance   Valid 1/9/2025 - 2/7/2025     LM FOR PT WITH [employer] PHONE NUMBER TO CALL AND SCHEDULE I FAXED OVER ORDER AND TOLD PT TO CALL OUR OFFICE WITHIN A WEEK OF HAVING MRI DONE TO SCHEDULE F/U APPT TO GO OVER RESULTS.

## 2025-02-14 ENCOUNTER — TELEPHONE (OUTPATIENT)
Dept: CARDIOLOGY CLINIC | Age: 50
End: 2025-02-14

## 2025-02-14 ENCOUNTER — HOSPITAL ENCOUNTER (OUTPATIENT)
Dept: CARDIOLOGY | Age: 50
Discharge: HOME | End: 2025-02-16
Attending: INTERNAL MEDICINE
Payer: COMMERCIAL

## 2025-02-14 VITALS
HEIGHT: 71 IN | WEIGHT: 297 LBS | BODY MASS INDEX: 41.58 KG/M2 | SYSTOLIC BLOOD PRESSURE: 129 MMHG | DIASTOLIC BLOOD PRESSURE: 91 MMHG

## 2025-02-14 DIAGNOSIS — Q24.8 LEFT VENTRICULAR OUTFLOW TRACT OBSTRUCTION: ICD-10-CM

## 2025-02-14 DIAGNOSIS — I42.1 HOCM (HYPERTROPHIC OBSTRUCTIVE CARDIOMYOPATHY) (HCC): ICD-10-CM

## 2025-02-14 LAB
ECHO BSA: 2.6 M2
ECHO IVC EXP: 1.1 CM
ECHO LV EDV A2C: 118 ML
ECHO LV EDV A4C: 151 ML
ECHO LV EDV INDEX A4C: 61 ML/M2
ECHO LV EDV NDEX A2C: 47 ML/M2
ECHO LV EF PHYSICIAN: 60 %
ECHO LV EJECTION FRACTION A2C: 59 %
ECHO LV EJECTION FRACTION A4C: 60 %
ECHO LV EJECTION FRACTION BIPLANE: 62 % (ref 55–100)
ECHO LV ESV A2C: 48 ML
ECHO LV ESV A4C: 60 ML
ECHO LV ESV INDEX A2C: 19 ML/M2
ECHO LV ESV INDEX A4C: 24 ML/M2
ECHO LV FRACTIONAL SHORTENING: 35 % (ref 28–44)
ECHO LV INTERNAL DIMENSION DIASTOLE INDEX: 1.93 CM/M2
ECHO LV INTERNAL DIMENSION DIASTOLIC: 4.8 CM (ref 4.2–5.9)
ECHO LV INTERNAL DIMENSION SYSTOLIC INDEX: 1.24 CM/M2
ECHO LV INTERNAL DIMENSION SYSTOLIC: 3.1 CM
ECHO LV IVSD: 2 CM (ref 0.6–1)
ECHO LV MASS 2D: 350.7 G (ref 88–224)
ECHO LV MASS INDEX 2D: 140.9 G/M2 (ref 49–115)
ECHO LV POSTERIOR WALL DIASTOLIC: 1.3 CM (ref 0.6–1)
ECHO LV RELATIVE WALL THICKNESS RATIO: 0.54
ECHO LVOT PEAK GRADIENT VALSALVA: 10 MMHG
ECHO LVOT PEAK GRADIENT: 5 MMHG
ECHO MV REGURGITANT PEAK GRADIENT: 29 MMHG
ECHO MV REGURGITANT PEAK VELOCITY: 2.7 M/S
ECHO MV REGURGITANT VTIA: 90.4 CM

## 2025-02-14 PROCEDURE — 93308 TTE F-UP OR LMTD: CPT

## 2025-02-14 PROCEDURE — 93321 DOPPLER ECHO F-UP/LMTD STD: CPT | Performed by: INTERNAL MEDICINE

## 2025-02-14 PROCEDURE — 93325 DOPPLER ECHO COLOR FLOW MAPG: CPT | Performed by: INTERNAL MEDICINE

## 2025-02-14 PROCEDURE — 93308 TTE F-UP OR LMTD: CPT | Performed by: INTERNAL MEDICINE

## 2025-02-14 NOTE — TELEPHONE ENCOUNTER
Week # 48 of Camzyos 5mg.   Echo reviewed.   EF 60-65%  Resting gradient is 5 mmHg and 10 mmHg with Valsalva Maneuver.      No HF admission in the last 30 days. No new OTC or prescription medications.     Confirmed upcoming appointment:  5/13/2025     Next echo window April 24-May 2

## 2025-02-17 DIAGNOSIS — R06.02 SOB (SHORTNESS OF BREATH): ICD-10-CM

## 2025-02-17 DIAGNOSIS — Q24.8 LEFT VENTRICULAR OUTFLOW TRACT OBSTRUCTION: ICD-10-CM

## 2025-02-17 DIAGNOSIS — I42.1 HOCM (HYPERTROPHIC OBSTRUCTIVE CARDIOMYOPATHY) (HCC): Primary | ICD-10-CM

## 2025-02-17 RX ORDER — MAVACAMTEN 5 MG/1
5 CAPSULE, GELATIN COATED ORAL DAILY
Qty: 30 CAPSULE | Refills: 3 | Status: SHIPPED | OUTPATIENT
Start: 2025-02-17

## 2025-04-24 ENCOUNTER — HOSPITAL ENCOUNTER (OUTPATIENT)
Age: 50
Discharge: HOME | End: 2025-04-26
Attending: INTERNAL MEDICINE
Payer: COMMERCIAL

## 2025-04-24 VITALS
HEIGHT: 71 IN | BODY MASS INDEX: 41.58 KG/M2 | WEIGHT: 297 LBS | DIASTOLIC BLOOD PRESSURE: 91 MMHG | SYSTOLIC BLOOD PRESSURE: 129 MMHG

## 2025-04-24 DIAGNOSIS — I42.1 HOCM (HYPERTROPHIC OBSTRUCTIVE CARDIOMYOPATHY) (HCC): ICD-10-CM

## 2025-04-24 DIAGNOSIS — Q24.8 LEFT VENTRICULAR OUTFLOW TRACT OBSTRUCTION: ICD-10-CM

## 2025-04-24 LAB
ECHO AV MEAN GRADIENT: 7 MMHG
ECHO AV MEAN VELOCITY: 1.1 M/S
ECHO AV PEAK GRADIENT: 12 MMHG
ECHO AV PEAK VELOCITY: 1.7 M/S
ECHO AV VELOCITY RATIO: 0.94
ECHO AV VTI: 34 CM
ECHO BSA: 2.6 M2
ECHO LA AREA 4C: 27.9 CM2
ECHO LA MAJOR AXIS: 6.1 CM
ECHO LA VOL MOD A4C: 103 ML (ref 18–58)
ECHO LA VOLUME INDEX MOD A4C: 41 ML/M2 (ref 16–34)
ECHO LV EDV A4C: 115 ML
ECHO LV EDV INDEX A4C: 46 ML/M2
ECHO LV EF PHYSICIAN: 60 %
ECHO LV EJECTION FRACTION A4C: 59 %
ECHO LV ESV A4C: 47 ML
ECHO LV ESV INDEX A4C: 19 ML/M2
ECHO LV FRACTIONAL SHORTENING: 43 % (ref 28–44)
ECHO LV INTERNAL DIMENSION DIASTOLE INDEX: 1.85 CM/M2
ECHO LV INTERNAL DIMENSION DIASTOLIC: 4.6 CM (ref 4.2–5.9)
ECHO LV INTERNAL DIMENSION SYSTOLIC INDEX: 1.04 CM/M2
ECHO LV INTERNAL DIMENSION SYSTOLIC: 2.6 CM
ECHO LV IVSD: 2.4 CM (ref 0.6–1)
ECHO LV MASS 2D: 486.6 G (ref 88–224)
ECHO LV MASS INDEX 2D: 195.4 G/M2 (ref 49–115)
ECHO LV POSTERIOR WALL DIASTOLIC: 1.8 CM (ref 0.6–1)
ECHO LV RELATIVE WALL THICKNESS RATIO: 0.78
ECHO LVOT AV VTI INDEX: 1.11
ECHO LVOT MEAN GRADIENT: 7 MMHG
ECHO LVOT PEAK GRADIENT: 10 MMHG
ECHO LVOT PEAK VELOCITY: 1.6 M/S
ECHO LVOT VTI: 37.6 CM
ECHO MV REGURGITANT PEAK GRADIENT: 112 MMHG
ECHO MV REGURGITANT PEAK VELOCITY: 5.3 M/S
ECHO RV INTERNAL DIMENSION: 3.5 CM

## 2025-04-24 PROCEDURE — 93308 TTE F-UP OR LMTD: CPT

## 2025-04-25 ENCOUNTER — HOSPITAL ENCOUNTER (OUTPATIENT)
Age: 50
Discharge: HOME | End: 2025-04-25
Payer: COMMERCIAL

## 2025-04-25 LAB
ANION GAP SERPL CALCULATED.3IONS-SCNC: 9 MMOL/L (ref 3–16)
BUN SERPL-MCNC: 14 MG/DL (ref 7–20)
CALCIUM SERPL-MCNC: 9 MG/DL (ref 8.3–10.6)
CHLORIDE SERPL-SCNC: 105 MMOL/L (ref 99–110)
CO2 SERPL-SCNC: 23 MMOL/L (ref 21–32)
CREAT SERPL-MCNC: 0.8 MG/DL (ref 0.9–1.3)
CREAT UR-MCNC: 222 MG/DL (ref 39–259)
GFR SERPLBLD CREATININE-BSD FMLA CKD-EPI: >90 ML/MIN/{1.73_M2}
GLUCOSE SERPL-MCNC: 91 MG/DL (ref 70–99)
POTASSIUM SERPL-SCNC: 4.3 MMOL/L (ref 3.5–5.1)
PROT UR-MCNC: 29.2 MG/DL
PROT/CREAT UR-RTO: 0.1 MG/DL
SODIUM SERPL-SCNC: 137 MMOL/L (ref 136–145)

## 2025-04-25 PROCEDURE — 82570 ASSAY OF URINE CREATININE: CPT

## 2025-04-25 PROCEDURE — 84156 ASSAY OF PROTEIN URINE: CPT

## 2025-04-25 PROCEDURE — 80048 BASIC METABOLIC PNL TOTAL CA: CPT

## 2025-04-28 ENCOUNTER — RESULTS FOLLOW-UP (OUTPATIENT)
Dept: CARDIOLOGY CLINIC | Age: 50
End: 2025-04-28

## 2025-04-28 DIAGNOSIS — Q24.8 LEFT VENTRICULAR OUTFLOW TRACT OBSTRUCTION: ICD-10-CM

## 2025-04-28 DIAGNOSIS — I42.1 HOCM (HYPERTROPHIC OBSTRUCTIVE CARDIOMYOPATHY) (HCC): Primary | ICD-10-CM

## 2025-04-28 NOTE — RESULT ENCOUNTER NOTE
Please call patient with echocardiogram results. Covering for DR. Angulo.   Echo shows heart function remains stable on current dosage of camzyos    Continue current regimen. Keep follow up

## 2025-05-07 NOTE — PROGRESS NOTES
Food Insecurity (11/7/2022)    Hunger Vital Sign     Worried About Running Out of Food in the Last Year: Never true     Ran Out of Food in the Last Year: Never true   Transportation Needs: Not on file   Physical Activity: Not on file   Stress: Not on file   Social Connections: Not on file   Intimate Partner Violence: Not on file   Housing Stability: Not on file       Review of Systems:   Constitutional: there has been no unanticipated weight loss. There's been no change in energy level, sleep pattern, or activity level.     Eyes: No visual changes or diplopia. No scleral icterus.  ENT: No Headaches, hearing loss or vertigo. No mouth sores or sore throat.  Cardiovascular: Reviewed in HPI  Respiratory: No cough or wheezing, no sputum production. No hematemesis.    Gastrointestinal: No abdominal pain, appetite loss, blood in stools. No change in bowel or bladder habits.  Genitourinary: No dysuria, trouble voiding, or hematuria.  Musculoskeletal:  No gait disturbance, weakness or joint complaints.  Integumentary: No rash or pruritis.  Neurological: No headache, diplopia, change in muscle strength, numbness or tingling. No change in gait, balance, coordination, mood, affect, memory, mentation, behavior.  Psychiatric: No anxiety, no depression.  Endocrine: No malaise, fatigue or temperature intolerance. No excessive thirst, fluid intake, or urination. No tremor.  Hematologic/Lymphatic: No abnormal bruising or bleeding, blood clots or swollen lymph nodes.  Allergic/Immunologic: No nasal congestion or hives.    Physical Examination:    Vitals:    05/13/25 0901   BP: 120/86   Pulse: 76   SpO2: 93%   Weight: 133.4 kg (294 lb)   Height: 1.803 m (5' 11\")           Body mass index is 41 kg/m².     Wt Readings from Last 3 Encounters:   05/13/25 133.4 kg (294 lb)   04/24/25 134.7 kg (297 lb)   02/14/25 134.7 kg (297 lb)     BP Readings from Last 3 Encounters:   05/13/25 120/86   04/24/25 (!) 129/91   02/14/25 (!) 129/91

## 2025-05-08 ENCOUNTER — TRANSCRIBE ORDERS (OUTPATIENT)
Dept: ADMINISTRATIVE | Age: 50
End: 2025-05-08

## 2025-05-08 DIAGNOSIS — R31.0 GROSS HEMATURIA: ICD-10-CM

## 2025-05-08 DIAGNOSIS — Q61.2 KIDNEY, POLYCYSTIC, AUTOSOMAL DOMINANT: Primary | ICD-10-CM

## 2025-05-13 ENCOUNTER — TRANSCRIBE ORDERS (OUTPATIENT)
Dept: ADMINISTRATIVE | Age: 50
End: 2025-05-13

## 2025-05-13 ENCOUNTER — HOSPITAL ENCOUNTER (OUTPATIENT)
Age: 50
Discharge: HOME | End: 2025-05-13
Payer: COMMERCIAL

## 2025-05-13 ENCOUNTER — OFFICE VISIT (OUTPATIENT)
Dept: CARDIOLOGY CLINIC | Age: 50
End: 2025-05-13
Payer: COMMERCIAL

## 2025-05-13 VITALS
HEIGHT: 71 IN | HEART RATE: 76 BPM | DIASTOLIC BLOOD PRESSURE: 86 MMHG | SYSTOLIC BLOOD PRESSURE: 120 MMHG | WEIGHT: 294 LBS | OXYGEN SATURATION: 93 % | BODY MASS INDEX: 41.16 KG/M2

## 2025-05-13 DIAGNOSIS — Q61.3 POLYCYSTIC KIDNEY: ICD-10-CM

## 2025-05-13 DIAGNOSIS — I42.1 HOCM (HYPERTROPHIC OBSTRUCTIVE CARDIOMYOPATHY) (HCC): Primary | ICD-10-CM

## 2025-05-13 DIAGNOSIS — R06.02 SOB (SHORTNESS OF BREATH): ICD-10-CM

## 2025-05-13 DIAGNOSIS — I42.1 HOCM (HYPERTROPHIC OBSTRUCTIVE CARDIOMYOPATHY) (HCC): ICD-10-CM

## 2025-05-13 DIAGNOSIS — N18.2 STAGE 2 CHRONIC KIDNEY DISEASE: ICD-10-CM

## 2025-05-13 DIAGNOSIS — Z79.899 MEDICATION MANAGEMENT: ICD-10-CM

## 2025-05-13 DIAGNOSIS — Q61.3 POLYCYSTIC KIDNEY, UNSPECIFIED: Primary | ICD-10-CM

## 2025-05-13 DIAGNOSIS — Q24.8 LEFT VENTRICULAR OUTFLOW TRACT OBSTRUCTION: ICD-10-CM

## 2025-05-13 LAB
25(OH)D3 SERPL-MCNC: 33.8 NG/ML
ANION GAP SERPL CALCULATED.3IONS-SCNC: 15 MMOL/L (ref 3–16)
BUN SERPL-MCNC: 10 MG/DL (ref 7–20)
CALCIUM SERPL-MCNC: 9.4 MG/DL (ref 8.3–10.6)
CHLORIDE SERPL-SCNC: 105 MMOL/L (ref 99–110)
CO2 SERPL-SCNC: 24 MMOL/L (ref 21–32)
CREAT SERPL-MCNC: 0.8 MG/DL (ref 0.9–1.3)
GFR SERPLBLD CREATININE-BSD FMLA CKD-EPI: >90 ML/MIN/{1.73_M2}
GLUCOSE SERPL-MCNC: 96 MG/DL (ref 70–99)
NT-PROBNP SERPL-MCNC: 48 PG/ML (ref 0–124)
POTASSIUM SERPL-SCNC: 4.1 MMOL/L (ref 3.5–5.1)
SODIUM SERPL-SCNC: 144 MMOL/L (ref 136–145)

## 2025-05-13 PROCEDURE — 82306 VITAMIN D 25 HYDROXY: CPT

## 2025-05-13 PROCEDURE — 80048 BASIC METABOLIC PNL TOTAL CA: CPT

## 2025-05-13 PROCEDURE — 3079F DIAST BP 80-89 MM HG: CPT | Performed by: INTERNAL MEDICINE

## 2025-05-13 PROCEDURE — 83880 ASSAY OF NATRIURETIC PEPTIDE: CPT

## 2025-05-13 PROCEDURE — 99215 OFFICE O/P EST HI 40 MIN: CPT | Performed by: INTERNAL MEDICINE

## 2025-05-13 PROCEDURE — 3074F SYST BP LT 130 MM HG: CPT | Performed by: INTERNAL MEDICINE

## 2025-05-13 PROCEDURE — G2211 COMPLEX E/M VISIT ADD ON: HCPCS | Performed by: INTERNAL MEDICINE

## 2025-05-13 PROCEDURE — 36415 COLL VENOUS BLD VENIPUNCTURE: CPT

## 2025-05-13 RX ORDER — SPIRONOLACTONE 25 MG/1
TABLET ORAL
Qty: 36 TABLET | Refills: 3 | Status: SHIPPED | OUTPATIENT
Start: 2025-05-13

## 2025-05-13 NOTE — PATIENT INSTRUCTIONS
Plan:  Start spironolactone (aldactone) 12.5 mg on Monday, Wednesday, Friday    Labs now: Vitamin D, BNP  Labs 3 weeks:  BMP  Follow up end of October

## 2025-05-15 ENCOUNTER — RESULTS FOLLOW-UP (OUTPATIENT)
Dept: CARDIOLOGY CLINIC | Age: 50
End: 2025-05-15

## 2025-06-05 ENCOUNTER — HOSPITAL ENCOUNTER (OUTPATIENT)
Dept: MRI IMAGING | Age: 50
Discharge: HOME | End: 2025-06-05

## 2025-06-05 DIAGNOSIS — Q61.3 POLYCYSTIC KIDNEY, UNSPECIFIED: ICD-10-CM

## 2025-06-16 DIAGNOSIS — Q24.8 LEFT VENTRICULAR OUTFLOW TRACT OBSTRUCTION: ICD-10-CM

## 2025-06-16 DIAGNOSIS — R06.02 SOB (SHORTNESS OF BREATH): ICD-10-CM

## 2025-06-16 DIAGNOSIS — I42.1 HOCM (HYPERTROPHIC OBSTRUCTIVE CARDIOMYOPATHY) (HCC): ICD-10-CM

## 2025-06-17 ENCOUNTER — TELEPHONE (OUTPATIENT)
Dept: CARDIOLOGY CLINIC | Age: 50
End: 2025-06-17

## 2025-06-17 RX ORDER — MAVACAMTEN 5 MG/1
CAPSULE, GELATIN COATED ORAL
Qty: 30 CAPSULE | Refills: 3 | Status: SHIPPED | OUTPATIENT
Start: 2025-06-17

## 2025-06-17 NOTE — TELEPHONE ENCOUNTER
Spoke with pt.'s wife. Informed her cardiac clearance was sent for urologic procedure. Per POLLY pt to take Camzyos that morning. She V/U. Letter faxed and scanned into chart

## 2025-06-17 NOTE — TELEPHONE ENCOUNTER
Last Office Visit: 5/13/2025 Provider: POLLY  **Is provider OOT? No    Next Office Visit: 10/28/2025 Provider: POLLY

## 2025-07-08 RX ORDER — LEVOFLOXACIN 5 MG/ML
500 INJECTION, SOLUTION INTRAVENOUS
Status: DISCONTINUED | OUTPATIENT
Start: 2025-07-09 | End: 2025-07-09 | Stop reason: HOSPADM

## 2025-07-09 ENCOUNTER — ANESTHESIA EVENT (OUTPATIENT)
Dept: OPERATING ROOM | Age: 50
End: 2025-07-09
Payer: COMMERCIAL

## 2025-07-09 ENCOUNTER — HOSPITAL ENCOUNTER (OUTPATIENT)
Age: 50
Setting detail: OUTPATIENT SURGERY
Discharge: HOME | End: 2025-07-09
Attending: UROLOGY | Admitting: UROLOGY
Payer: COMMERCIAL

## 2025-07-09 ENCOUNTER — APPOINTMENT (OUTPATIENT)
Dept: GENERAL RADIOLOGY | Age: 50
End: 2025-07-09
Attending: UROLOGY
Payer: COMMERCIAL

## 2025-07-09 ENCOUNTER — ANESTHESIA (OUTPATIENT)
Dept: OPERATING ROOM | Age: 50
End: 2025-07-09
Payer: COMMERCIAL

## 2025-07-09 VITALS
RESPIRATION RATE: 13 BRPM | DIASTOLIC BLOOD PRESSURE: 83 MMHG | BODY MASS INDEX: 39.86 KG/M2 | SYSTOLIC BLOOD PRESSURE: 111 MMHG | OXYGEN SATURATION: 98 % | HEIGHT: 71 IN | HEART RATE: 61 BPM | TEMPERATURE: 97.8 F | WEIGHT: 284.7 LBS

## 2025-07-09 PROCEDURE — 3700000000 HC ANESTHESIA ATTENDED CARE: Performed by: UROLOGY

## 2025-07-09 PROCEDURE — 74420 UROGRAPHY RTRGR +-KUB: CPT

## 2025-07-09 PROCEDURE — 2580000003 HC RX 258: Performed by: UROLOGY

## 2025-07-09 PROCEDURE — 6360000004 HC RX CONTRAST MEDICATION: Performed by: UROLOGY

## 2025-07-09 PROCEDURE — 2500000003 HC RX 250 WO HCPCS: Performed by: UROLOGY

## 2025-07-09 PROCEDURE — 7100000010 HC PHASE II RECOVERY - FIRST 15 MIN: Performed by: UROLOGY

## 2025-07-09 PROCEDURE — 3600000004 HC SURGERY LEVEL 4 BASE: Performed by: UROLOGY

## 2025-07-09 PROCEDURE — 6370000000 HC RX 637 (ALT 250 FOR IP): Performed by: ANESTHESIOLOGY

## 2025-07-09 PROCEDURE — 7100000001 HC PACU RECOVERY - ADDTL 15 MIN: Performed by: UROLOGY

## 2025-07-09 PROCEDURE — 3700000001 HC ADD 15 MINUTES (ANESTHESIA): Performed by: UROLOGY

## 2025-07-09 PROCEDURE — C1769 GUIDE WIRE: HCPCS | Performed by: UROLOGY

## 2025-07-09 PROCEDURE — 6360000002 HC RX W HCPCS: Performed by: REGISTERED NURSE

## 2025-07-09 PROCEDURE — 3600000014 HC SURGERY LEVEL 4 ADDTL 15MIN: Performed by: UROLOGY

## 2025-07-09 PROCEDURE — 93005 ELECTROCARDIOGRAM TRACING: CPT | Performed by: ANESTHESIOLOGY

## 2025-07-09 PROCEDURE — C1894 INTRO/SHEATH, NON-LASER: HCPCS | Performed by: UROLOGY

## 2025-07-09 PROCEDURE — 6360000002 HC RX W HCPCS: Performed by: UROLOGY

## 2025-07-09 PROCEDURE — 7100000000 HC PACU RECOVERY - FIRST 15 MIN: Performed by: UROLOGY

## 2025-07-09 PROCEDURE — 2709999900 HC NON-CHARGEABLE SUPPLY: Performed by: UROLOGY

## 2025-07-09 PROCEDURE — 7100000011 HC PHASE II RECOVERY - ADDTL 15 MIN: Performed by: UROLOGY

## 2025-07-09 PROCEDURE — 2500000003 HC RX 250 WO HCPCS: Performed by: REGISTERED NURSE

## 2025-07-09 RX ORDER — ONDANSETRON 2 MG/ML
4 INJECTION INTRAMUSCULAR; INTRAVENOUS
Status: DISCONTINUED | OUTPATIENT
Start: 2025-07-09 | End: 2025-07-09 | Stop reason: HOSPADM

## 2025-07-09 RX ORDER — SODIUM CHLORIDE 0.9 % (FLUSH) 0.9 %
5-40 SYRINGE (ML) INJECTION EVERY 12 HOURS SCHEDULED
Status: DISCONTINUED | OUTPATIENT
Start: 2025-07-09 | End: 2025-07-09 | Stop reason: HOSPADM

## 2025-07-09 RX ORDER — METOPROLOL SUCCINATE 50 MG/1
50 TABLET, EXTENDED RELEASE ORAL DAILY
Status: DISCONTINUED | OUTPATIENT
Start: 2025-07-09 | End: 2025-07-09 | Stop reason: HOSPADM

## 2025-07-09 RX ORDER — LIDOCAINE HYDROCHLORIDE 10 MG/ML
0.5 INJECTION, SOLUTION EPIDURAL; INFILTRATION; INTRACAUDAL; PERINEURAL ONCE
Status: DISCONTINUED | OUTPATIENT
Start: 2025-07-09 | End: 2025-07-09 | Stop reason: HOSPADM

## 2025-07-09 RX ORDER — SODIUM CHLORIDE 9 MG/ML
INJECTION, SOLUTION INTRAVENOUS PRN
Status: DISCONTINUED | OUTPATIENT
Start: 2025-07-09 | End: 2025-07-09 | Stop reason: HOSPADM

## 2025-07-09 RX ORDER — OXYCODONE HYDROCHLORIDE 5 MG/1
10 TABLET ORAL PRN
Status: DISCONTINUED | OUTPATIENT
Start: 2025-07-09 | End: 2025-07-09 | Stop reason: HOSPADM

## 2025-07-09 RX ORDER — SODIUM CHLORIDE 0.9 % (FLUSH) 0.9 %
5-40 SYRINGE (ML) INJECTION PRN
Status: DISCONTINUED | OUTPATIENT
Start: 2025-07-09 | End: 2025-07-09 | Stop reason: HOSPADM

## 2025-07-09 RX ORDER — OXYCODONE HYDROCHLORIDE 5 MG/1
5 TABLET ORAL PRN
Status: DISCONTINUED | OUTPATIENT
Start: 2025-07-09 | End: 2025-07-09 | Stop reason: HOSPADM

## 2025-07-09 RX ORDER — PROPOFOL 10 MG/ML
INJECTION, EMULSION INTRAVENOUS
Status: DISCONTINUED | OUTPATIENT
Start: 2025-07-09 | End: 2025-07-09 | Stop reason: SDUPTHER

## 2025-07-09 RX ORDER — DEXMEDETOMIDINE HYDROCHLORIDE 100 UG/ML
INJECTION, SOLUTION INTRAVENOUS
Status: DISCONTINUED | OUTPATIENT
Start: 2025-07-09 | End: 2025-07-09 | Stop reason: SDUPTHER

## 2025-07-09 RX ORDER — SODIUM CHLORIDE 9 MG/ML
INJECTION, SOLUTION INTRAVENOUS CONTINUOUS
Status: DISCONTINUED | OUTPATIENT
Start: 2025-07-09 | End: 2025-07-09 | Stop reason: HOSPADM

## 2025-07-09 RX ADMIN — PROPOFOL 50 MG: 10 INJECTION, EMULSION INTRAVENOUS at 15:20

## 2025-07-09 RX ADMIN — DEXMEDETOMIDINE HYDROCHLORIDE 6 MCG: 100 INJECTION, SOLUTION INTRAVENOUS at 15:15

## 2025-07-09 RX ADMIN — SODIUM CHLORIDE: 9 INJECTION, SOLUTION INTRAVENOUS at 15:08

## 2025-07-09 RX ADMIN — LEVOFLOXACIN 500 MG: 5 INJECTION, SOLUTION INTRAVENOUS at 15:15

## 2025-07-09 RX ADMIN — PROPOFOL 50 MG: 10 INJECTION, EMULSION INTRAVENOUS at 15:17

## 2025-07-09 RX ADMIN — PROPOFOL 100 MG: 10 INJECTION, EMULSION INTRAVENOUS at 15:15

## 2025-07-09 RX ADMIN — PROPOFOL 120 MCG/KG/MIN: 10 INJECTION, EMULSION INTRAVENOUS at 15:16

## 2025-07-09 RX ADMIN — METOPROLOL SUCCINATE 50 MG: 50 TABLET, EXTENDED RELEASE ORAL at 14:24

## 2025-07-09 ASSESSMENT — PAIN - FUNCTIONAL ASSESSMENT: PAIN_FUNCTIONAL_ASSESSMENT: NONE - DENIES PAIN

## 2025-07-09 NOTE — FLOWSHEET NOTE
Phase 1 complete, pt seen by anesthesiologist. VSS, pt resting comfortably. Assessment unchanged. Will transition to phase 2 for d/c, family updated.

## 2025-07-09 NOTE — ANESTHESIA PRE PROCEDURE
Department of Anesthesiology  Preprocedure Note       Name:  Mervin Herndon   Age:  49 y.o.  :  1975                                          MRN:  [id number]         Date:  2025      Surgeon: Surgeon(s):  Josh Keyes MD    Procedure: Procedure(s):  CYSTOSCOPY, BILATERAL RETROGRADE PYELOGRAM    Medications prior to admission:   Prior to Admission medications    Medication Sig Start Date End Date Taking? Authorizing Provider   CAMZYOS 5 MG CAPS TAKE 1 CAPSULE BY MOUTH 1 TIME A DAY 25  Yes Corazon Angulo MD   spironolactone (ALDACTONE) 25 MG tablet Take one tablet on Monday, Wednesday, 25  Yes Corazon Angulo MD   apixaban (ELIQUIS) 5 MG TABS tablet Take 1 tablet by mouth 2 times daily 24  Yes Corazon Angulo MD   metoprolol succinate (TOPROL XL) 50 MG extended release tablet Take 1 tablet by mouth daily 24  Yes Corazon Angulo MD   losartan (COZAAR) 100 MG tablet Take 1 tablet by mouth daily 24  Yes Corazon Angluo MD   aspirin 81 MG chewable tablet Take 1 tablet by mouth daily   Yes Provider, MD Nat   vitamin D (ERGOCALCIFEROL) 1.25 MG (97498 UT) CAPS capsule Take 1 capsule by mouth once a week  Patient not taking: Reported on 24   Corazon Angulo MD   atorvastatin (LIPITOR) 40 MG tablet Take 1 tablet by mouth nightly  Patient not taking: Reported on 2025   Ruben Armando MD       Current medications:    Current Facility-Administered Medications   Medication Dose Route Frequency Provider Last Rate Last Admin    0.9 % sodium chloride infusion   IntraVENous Continuous Josh Keyes MD        lidocaine PF 1 % injection 0.5 mL  0.5 mL IntraDERmal Once Josh Keyes MD        metoprolol succinate (TOPROL XL) extended release tablet 50 mg  50 mg Oral Daily Rodriguez Foster DO   50 mg at 25 1424    levoFLOXacin (LEVAQUIN) 500 MG/100ML infusion 500 mg  500 mg IntraVENous 60 Min Pre-Op Josh Keyes MD

## 2025-07-09 NOTE — DISCHARGE INSTRUCTIONS
small amounts of urine.     Your urine is pink, red, or cloudy, or smells bad. It is normal for the urine to have a pinkish color for a few days after the test, but call if it does not get better.   Where can you learn more?  Go to https://[email or web address].net/patientEd and enter C842 to learn more about \"Cystoscopy: What to Expect at Home.\"  Current as of: April 30, 2024  Content Version: 14.5  © 2024-2025 [employer].   Care instructions adapted under license by [employer]. If you have questions about a medical condition or this instruction, always ask your healthcare professional. [employer], [employer], disclaims any warranty or liability for your use of this information.        ANESTHESIA DISCHARGE INSTRUCTIONS    Wear your seatbelt home.  You are under the influence of drugs-do not drink alcohol, drive, operate machinery, make any important decisions or sign any legal documents for 24 hours.  Children should not ride bikes, skate boards or play on gym sets for 24 hours after surgery.  A responsible adult needs to be with you for 24 hours.  You may experience lightheadedness, dizziness, or sleepiness following surgery.  Rest at home today- increase activity as tolerated.  Progress slowly to a regular diet unless your physician has instructed you otherwise. Drink plenty of water.  If persistent nausea and vomiting becomes a problem, call your physician.  Coughing, sore throat and muscle aches are other side effects of anesthesia, and should improve with time.  Do not drive or operate machinery while taking narcotics.  Females of childbearing potential and on hormonal birth control, should use two forms of contraception following procedure if given a medication called sugammadex and/or emend. Additional contraception should be used for 7 days for sugammadex and/or 28 days for emend. These medications have a potential to reduce the effectiveness of hormonal birth control.      Intravenous Pyelogram

## 2025-07-09 NOTE — PROGRESS NOTES
Called and spoke with wife Daysi concerning medication patient is to take day of the procedure. Per order of Dr. Angulo, patient is to take the camzyos morning of the procedure and he is to stop the eliquis 2 days prior to the procedure and resume one day after the procedure. Wife verbalized understanding. Also requested that she call Dr. Angulo's office concerning the low dose aspirin and if it should be stopped prior to the procedure. Wife verbalized understanding and stated she would contact the office.  
Pt arrived to PACU from OR, VSS, pt arouses to voice. No urethral drainage noted. Will continue to monitor.   
Teaching / education initiated regarding perioperative experience, expectations, and pain management during stay. Patient verbalized understanding.   
doses.  Bring your rescue inhaler with you that day.  If you take a blood presurse medication ending in \"PRIL\" or \"SARTAN\" (such as Lisinopril or Losartan) do NOT take the EVENING before your surgery or the MORNING of your surgery.  Wear clean loose fitting clothing.  Remove all jewelrey, piercings, rings and leave at home.  Glasses, hearing aides and contacts will be removed prior to surgery. Please bring appropriate cases.  NO MAKEUP,especially eye  makeup.   NO NAIL POLISH. If you have gel or artificial nails check with your surgeon.  Leave your hair down, no buns, ponytails or metal accessories.  If you are staying overnight bring your CPAP.  Do not smoke or drink alcohol, or use recreation drugs 24 hours prior to surgery.  Bring a copy of your Living Will or Power of  the day of surgery.  Visitor policies are subject to change, check with your provider.  Masking is at the discretion of the facility.  17. OTHER_Please contact Dr. Angulo's office for instructions on continuing or stopping the low dose aspirin prior to surgery._________________________________________________________________________________      FOR ANY QUESTIONS CALL THE PRE-ADMISSION TESTING DEPARTMENT -[phone]- IF YOU GET THE VOICE MAIL LEAVE A MESSAGE.    DO NOT COMMUNICATE WITH US VIA [employer]!    ALL OF THE ABOVE INFORMATION WAS REVIEWED WITH PATIENT/CAREGIVER AND THEY VERBALIZE UNDERSTANDING    PATIENT/REPRESENTATIVE PER PHONE _grace's wife Daysi_______ ABOVE INSTRUCTIONS SENT TO [employer] yes_  PATIENT NOT REACHED INSTRUCTIONS LEFT ON VOICEMAIL ___ OFFICE NOTIFIED UNABLE TO REACH __

## 2025-07-09 NOTE — H&P
Urology Preoperative History & Physical  [facility]     Patient: Mervin Herndon MRN: [id number]  Room/Bed: OR/NONE   YOB: 1975  Age/Sex: 49 y.o.male  Admission Date: 7/9/2025     Date of Service:  7/9/2025    ASSESSMENT/PLAN     Here for cystoscopy and bilateral retrograde pyelograms as part of urologic workup for hematuria    Plan:  To OR for above procedure    All patient questions were answered. He understands the plan as listed above.    HISTORY     Chief Complaint: As above    History of Present Illness: Mervin Herndon is a 49 y.o. male with above listed problems.  No changes in history/physical since last evaluation.  No change in symptoms.      Past Medical History:  He has a past medical history of Branch retinal artery occlusion of left eye (04/2021), Cerebral artery occlusion with cerebral infarction (HCC), CKD (chronic kidney disease), HTN (hypertension), Microscopic hematuria: with atypical cytology - (11/01/2021), Polycystic kidney, PSVT (paroxysmal supraventricular tachycardia), Stage 2 chronic kidney disease (08/12/2017), and Tubular adenoma of colon (09/21/2020).     Hospital Problem List:  Active Problems:    * No active hospital problems. *  Resolved Problems:    * No resolved hospital problems. *      Past Surgical History:  He has a past surgical history that includes Cardiac catheterization (2008); other surgical history (2001); Colonoscopy (09/15/2020); Colonoscopy (N/A, 09/15/2020); and Cardiac catheterization (03/22/2021).     Social History:  He reports that he has been smoking cigarettes. He has never used smokeless tobacco. He reports current alcohol use. He reports that he does not use drugs.     Family History:  family history includes Diabetes in his son; Heart Attack in his paternal grandfather; Other in his father and mother.    Allergies:  Allergies   Allergen Reactions    Ibuprofen      kidneys    Codeine Rash    Vicodin

## 2025-07-09 NOTE — OP NOTE
Urology Operative Note  [facility]     Patient: Mervin Herndon MRN: [id number]  Room/Bed: OR/NONE   YOB: 1975  Age/Sex: 49 y.o.male  Admission Date: 7/9/2025     Date of Operation: 7/9/2025    Preoperative Diagnosis: Gross hematuria    Postoperative Diagnosis: Same    Procedure:    1. Cystourethroscopy with bilateral retrograde pyelograms    Surgeon:   Josh Keyes MD, MARIELLE    Anesthesia: General/LMA    Indications: Mervin Herndon is a 49 y.o. male who presents for the above named surgery. Informed consent was obtained and the risks, benefits, and details of the procedure were explained to the patient who elected to proceed.    Details of Procedure:  The patient was brought to the operating room and placed in the supine position on the operating room table. SCDs were placed on the lower extremities. Following induction of anesthesia the patient was positioned in a supine position, all pressure points were padded, and the genitals were prepped and draped in the usual sterile fashion with administration of local anesthesia with lidocaine jelly. A routine timeout was performed, confirming the patient, procedure, site, risk of fire, patient allergies and confirming that preoperative antibiotics had been administered prior to beginning.      A 21 [place] rigid cystoscope was passed atraumatically per urethra into the bladder.  The urethra was normal.  The bladder was then thoroughly examined.  There were no stones, tumors, diverticula, or other abnormality seen.  The bilateral ureteral orifices were in their orthotopic location and demonstrated efflux of clear yellow urine.  I then turned my attention to the right ureteral orifice to perform a retrograde pyelogram.  An open-ended catheter was used to guide a sensor wire into the distal ureter over which the open-ended catheter was gently advanced without resistance into the distal ureter.  A retrograde pyelogram performed to evaluate

## 2025-07-09 NOTE — ANESTHESIA POSTPROCEDURE EVALUATION
Department of Anesthesiology  Postprocedure Note    Patient: Mervin Herndon  MRN: [id number]  YOB: 1975  Date of evaluation: 7/9/2025    Procedure Summary       Date: 07/09/25 Room / Location: [address]    Anesthesia Start: 1508 Anesthesia Stop: 1544    Procedure: CYSTOSCOPY, BILATERAL RETROGRADE PYELOGRAM (Bilateral) Diagnosis:       Gross hematuria      (Gross hematuria [R31.0])    Surgeons: Josh Keyes MD Responsible Provider: Rodriguez Foster DO    Anesthesia Type: MAC ASA Status: 3            Anesthesia Type: MAC    Ruben Phase I: Ruben Score: 8    Ruben Phase II:      Anesthesia Post Evaluation    Patient location during evaluation: PACU  Patient participation: complete - patient participated  Level of consciousness: awake and alert  Airway patency: patent  Nausea & Vomiting: no nausea and no vomiting  Cardiovascular status: blood pressure returned to baseline  Respiratory status: acceptable  Pain management: adequate    No notable events documented.

## 2025-07-10 LAB
EKG ATRIAL RATE: 64 BPM
EKG DIAGNOSIS: NORMAL
EKG P AXIS: 25 DEGREES
EKG P-R INTERVAL: 180 MS
EKG Q-T INTERVAL: 426 MS
EKG QRS DURATION: 112 MS
EKG QTC CALCULATION (BAZETT): 439 MS
EKG R AXIS: -17 DEGREES
EKG T AXIS: 8 DEGREES
EKG VENTRICULAR RATE: 64 BPM

## 2025-07-10 PROCEDURE — 93010 ELECTROCARDIOGRAM REPORT: CPT | Performed by: INTERNAL MEDICINE

## 2025-07-17 ENCOUNTER — TELEPHONE (OUTPATIENT)
Dept: ADMINISTRATIVE | Age: 50
End: 2025-07-17

## 2025-07-17 NOTE — TELEPHONE ENCOUNTER
Submitted PA for Camzyos 5MG capsules  [facility]ia [facility] Key: [id number] STATUS: approved 7/17/25-7/17/26, letter scanned into the chart    Follow up done daily; if no decision with in three days we will refax.  If another three days goes by with no decision will call the insurance for status.

## (undated) DEVICE — SOLUTION IRRIG 1000ML H2O PIC PLAS SHATTERPROOF CONTAINER

## (undated) DEVICE — GLOVE ORANGE PI 7   MSG9070

## (undated) DEVICE — FORCEPS BX JUMBO L240CM DIA2.8MM WRK CHN 3.2MM ORNG W/ NDL

## (undated) DEVICE — CYSTO: Brand: MEDLINE INDUSTRIES, INC.

## (undated) DEVICE — ENDO CARRY-ON PROCEDURE KIT INCLUDES SUCTION TUBING, LUBRICANT, GAUZE, BIOHAZARD STICKER, TRANSPORT PAD AND INTERCEPT BEDSIDE KIT.: Brand: ENDO CARRY-ON PROCEDURE KIT

## (undated) DEVICE — PEEL-AWAY, INTRODUCER: Brand: PEEL-AWAY

## (undated) DEVICE — BAG DRAINAGE NS

## (undated) DEVICE — GUIDEWIRE ENDOSCP L150CM DIA0.035IN TIP 3CM PTFE NIT

## (undated) DEVICE — WET SKIN PREP TRAY: Brand: MEDLINE INDUSTRIES, INC.

## (undated) DEVICE — SOLUTION IV STRL H2O 1000 ML INJ USP EXCEL CONTAINER

## (undated) DEVICE — ELECTRODE,RADIOTRANSLUCENT,FOAM,3PK: Brand: MEDLINE